# Patient Record
Sex: MALE | Race: WHITE | Employment: UNEMPLOYED | RURAL
[De-identification: names, ages, dates, MRNs, and addresses within clinical notes are randomized per-mention and may not be internally consistent; named-entity substitution may affect disease eponyms.]

---

## 2021-02-26 PROBLEM — Z86.718 HISTORY OF DVT (DEEP VEIN THROMBOSIS): Status: ACTIVE | Noted: 2021-02-26

## 2021-03-09 RX ORDER — OMEPRAZOLE 20 MG/1
CAPSULE, DELAYED RELEASE ORAL
Qty: 30 CAP | Refills: 5 | Status: SHIPPED | OUTPATIENT
Start: 2021-03-09 | End: 2022-02-11

## 2021-03-09 RX ORDER — WARFARIN 2 MG/1
TABLET ORAL
Qty: 30 TAB | Refills: 1 | Status: SHIPPED | OUTPATIENT
Start: 2021-03-09 | End: 2021-03-26

## 2021-03-09 NOTE — TELEPHONE ENCOUNTER
Received fax from pharmacy requesting refills including tamsulosin.  I do not see this on the current med list.  Please advise.  Patient does need all others refilled.  I have pended the ones that were listed just not the tamsulosin.    Requested Prescriptions     Pending Prescriptions Disp Refills   • warfarin (COUMADIN) 2 mg tablet 30 Tab 3     Sig: take 1 tablet by mouth once daily   • omeprazole (PRILOSEC) 20 mg capsule

## 2021-03-11 ENCOUNTER — HOSPITAL ENCOUNTER (OUTPATIENT)
Dept: BEHAVIORAL/MENTAL HEALTH | Age: 71
Discharge: HOME | End: 2021-03-11

## 2021-03-11 RX ORDER — MIRTAZAPINE 30 MG/1
15-30 TABLET, FILM COATED ORAL
Qty: 30 TAB | Refills: 5 | Status: SHIPPED | OUTPATIENT
Start: 2021-03-11 | End: 2021-03-12 | Stop reason: SDUPTHER

## 2021-03-11 RX ORDER — TAMSULOSIN HYDROCHLORIDE 0.4 MG/1
0.4 CAPSULE ORAL DAILY
Qty: 90 CAP | Refills: 1 | Status: SHIPPED | OUTPATIENT
Start: 2021-03-11 | End: 2021-08-31

## 2021-03-11 NOTE — PROGRESS NOTES
INTERVAL HISTORY (In Person):  Mr. Jose M Vargas is a 72-year-old  white male following up with me almost 9 months since his last appointment regarding a longstanding history of severe Generalized Anxiety Disorder as well as a remote history of Depression, for which he has been in remission for some time. He has been maintained on Diazepam for over 45 years as noted below, with reasonable control of his anxiety noted. Bianca Francois has historically been very sensitive to other medications including other benzos, and has been unable to tolerate many, many meds, but the Valium is something he has been comfortable with, and has utilized on a controlled basis.  Last year we started Fluoxetine due to some mild dysphoria but the tabs were too expensive so we went back to the capsules again.     He comes in today stating that he's been feeling \"OK\" overall, although he's still very sore physically from moving to [facility] 2 months ago. He's been more distressed by his aging, and feeling much more impaired and limited in his abilities. He's also been more distressed by various political and social issues, and that's made him more irritable and somewhat paranoid (thought my computer camera was recording our session and could be used against him). His suspiciousness diminished as the session progressed, but his thinking remained slightly eccentric and bordering on almost being delusional. Overall, however, he feels he's coping with everything as well as possible. He still feels the Diazepam and Mirtazapine help his anxiety and insomnia. He's fully off the Prozac again and we discussed possibly restarting it down the road, if needed. No med SE's reported at all.  Wants to stay on this regimen going forward.       CURRENT MEDICATIONS:  1.  Diazepam 10 mg qid prn--averages 3.5/day  2.   10-20 mg daily (20 mg tabs)--stopped this several months ago  3.  Mirtazapine 15 mg qhs prn (30 mg)--taking most every night     MENTAL STATUS EXAM:   Gulshan was noted to be a casually dressed, adequately groomed 82-year-old who appeared his stated age. He was fairly pleasant and cooperative, but slightly more irritable and anxious this time. still exhibits a slightly restricted affect, which is c/w his baseline.  He denied any severe depressive symptoms, but his mood has been slightly worse. However, he denies any feelings of hopeless or suicidal thinking. There was no evidence of any breana or hypomania, nor any symptoms of psychosis.  His anxiety is still evident but reasonably well controlled, overall.  His judgment and insight appear to be adequate, and his cognitive functioning appears to be fully intact with no deficits noted.       DIAGNOSTIC IMPRESSION:                     Axis I       Generalized Anxiety Disorder, moderate to severe (F41.1)                  Major Depression, mild (F33.0)  Axis II      Deferred. Axis III     Degenerative joint disease; history of DVT; lupus; history of bowel resection; chronic pain issues.     PLAN:  1.  Continue the Diazepam at 10 mg qid prn--has 3 months of refills. 2.  Continue off the --can restart it if needed. 3.  Continue the Mirtazapine at 15 mg qhs prn (30 mg)--#30 with 5 refills. 4.  He will follow up within the next 6 months, sooner if needed.

## 2021-03-11 NOTE — TELEPHONE ENCOUNTER
Received fax for refill on tamsulosin and I do not see on current med list.  Please advise or send in Tamsulosin 0.4 mg caps 1 cap PO every day to [facility].

## 2021-03-12 RX ORDER — MIRTAZAPINE 30 MG/1
15-30 TABLET, FILM COATED ORAL
Qty: 90 TAB | Refills: 1 | Status: SHIPPED | OUTPATIENT
Start: 2021-03-12 | End: 2021-11-22 | Stop reason: SDUPTHER

## 2021-03-22 ENCOUNTER — TELEPHONE (OUTPATIENT)
Dept: FAMILY MEDICINE CLINIC | Age: 71
End: 2021-03-22

## 2021-03-22 RX ORDER — WARFARIN 2 MG/1
TABLET ORAL
Qty: 30 TAB | Refills: 1 | Status: CANCELLED | OUTPATIENT
Start: 2021-03-22

## 2021-03-23 ENCOUNTER — TELEPHONE (OUTPATIENT)
Dept: PSYCHIATRY | Age: 71
End: 2021-03-23

## 2021-03-23 RX ORDER — FLUOXETINE 20 MG/1
20 TABLET ORAL DAILY
Qty: 30 TAB | Refills: 5 | Status: SHIPPED | OUTPATIENT
Start: 2021-03-23 | End: 2021-04-14 | Stop reason: SDUPTHER

## 2021-03-24 RX ORDER — WARFARIN 3 MG/1
3 TABLET ORAL DAILY
Qty: 30 TAB | Refills: 0 | Status: SHIPPED | OUTPATIENT
Start: 2021-03-24 | End: 2021-03-26 | Stop reason: SDUPTHER

## 2021-03-24 NOTE — TELEPHONE ENCOUNTER
Patient is needing refill of coumadin. He was prescribed by previous MD the 3 mg and confirmed that with patient and [facility] pharmacy. Patient needs corrected 3 mg rx sent to [facility] in [address] [place] [address]. Patient is coming in to discuss upcoming dental procedure and will have INR checked then and needs his 3 mg sent to pharmacy.

## 2021-03-26 ENCOUNTER — OFFICE VISIT (OUTPATIENT)
Dept: FAMILY MEDICINE CLINIC | Age: 71
End: 2021-03-26

## 2021-03-26 ENCOUNTER — TELEPHONE (OUTPATIENT)
Dept: FAMILY MEDICINE CLINIC | Age: 71
End: 2021-03-26

## 2021-03-26 VITALS
RESPIRATION RATE: 18 BRPM | TEMPERATURE: 97.1 F | SYSTOLIC BLOOD PRESSURE: 112 MMHG | OXYGEN SATURATION: 97 % | BODY MASS INDEX: 29.61 KG/M2 | HEIGHT: 68 IN | DIASTOLIC BLOOD PRESSURE: 71 MMHG | WEIGHT: 195.4 LBS | HEART RATE: 71 BPM

## 2021-03-26 DIAGNOSIS — Z86.718 HISTORY OF DVT (DEEP VEIN THROMBOSIS): ICD-10-CM

## 2021-03-26 DIAGNOSIS — Z13.220 LIPID SCREENING: ICD-10-CM

## 2021-03-26 DIAGNOSIS — Z11.59 ENCOUNTER FOR HEPATITIS C SCREENING TEST FOR LOW RISK PATIENT: ICD-10-CM

## 2021-03-26 DIAGNOSIS — Z00.00 MEDICARE ANNUAL WELLNESS VISIT, SUBSEQUENT: Primary | ICD-10-CM

## 2021-03-26 DIAGNOSIS — D68.59 HYPERCOAGULABLE STATE (HCC): ICD-10-CM

## 2021-03-26 DIAGNOSIS — K91.2 SHORT BOWEL SYNDROME: ICD-10-CM

## 2021-03-26 DIAGNOSIS — Z79.01 CHRONIC ANTICOAGULATION: ICD-10-CM

## 2021-03-26 DIAGNOSIS — E78.1 HYPERTRIGLYCERIDEMIA: ICD-10-CM

## 2021-03-26 DIAGNOSIS — Z79.01 LONG TERM (CURRENT) USE OF ANTICOAGULANTS: ICD-10-CM

## 2021-03-26 LAB
INR BLD: 2.4
PT POC: 24.5 SECONDS
VALID INTERNAL CONTROL?: YES

## 2021-03-26 PROCEDURE — G8536 NO DOC ELDER MAL SCRN: HCPCS | Performed by: FAMILY MEDICINE

## 2021-03-26 PROCEDURE — 1101F PT FALLS ASSESS-DOCD LE1/YR: CPT | Performed by: FAMILY MEDICINE

## 2021-03-26 PROCEDURE — G8417 CALC BMI ABV UP PARAM F/U: HCPCS | Performed by: FAMILY MEDICINE

## 2021-03-26 PROCEDURE — 36415 COLL VENOUS BLD VENIPUNCTURE: CPT | Performed by: FAMILY MEDICINE

## 2021-03-26 PROCEDURE — G8427 DOCREV CUR MEDS BY ELIG CLIN: HCPCS | Performed by: FAMILY MEDICINE

## 2021-03-26 PROCEDURE — 85610 PROTHROMBIN TIME: CPT | Performed by: FAMILY MEDICINE

## 2021-03-26 PROCEDURE — 99214 OFFICE O/P EST MOD 30 MIN: CPT | Performed by: FAMILY MEDICINE

## 2021-03-26 PROCEDURE — G0438 PPPS, INITIAL VISIT: HCPCS | Performed by: FAMILY MEDICINE

## 2021-03-26 PROCEDURE — G9717 DOC PT DX DEP/BP F/U NT REQ: HCPCS | Performed by: FAMILY MEDICINE

## 2021-03-26 PROCEDURE — 3017F COLORECTAL CA SCREEN DOC REV: CPT | Performed by: FAMILY MEDICINE

## 2021-03-26 RX ORDER — WARFARIN 3 MG/1
3 TABLET ORAL DAILY
Qty: 90 TAB | Refills: 1 | Status: SHIPPED | OUTPATIENT
Start: 2021-03-26 | End: 2021-04-23 | Stop reason: SINTOL

## 2021-03-26 RX ORDER — FENOFIBRATE 54 MG/1
TABLET ORAL
Qty: 90 TAB | Refills: 1 | Status: SHIPPED | OUTPATIENT
Start: 2021-03-26 | End: 2021-09-07 | Stop reason: SDUPTHER

## 2021-03-26 NOTE — PATIENT INSTRUCTIONS
Medicare Wellness Visit, Male The best way to live healthy is to have a lifestyle where you eat a well-balanced diet, exercise regularly, limit alcohol use, and quit all forms of tobacco/nicotine, if applicable. Regular preventive services are another way to keep healthy. Preventive services (vaccines, screening tests, monitoring & exams) can help personalize your care plan, which helps you manage your own care. Screening tests can find health problems at the earliest stages, when they are easiest to treat. [facility] follows the current, evidence-based guidelines published by the [facility] Raheel Robi ([facility]) when recommending preventive services for our patients. Because we follow these guidelines, sometimes recommendations change over time as research supports it. (For example, a prostate screening blood test is no longer routinely recommended for men with no symptoms). Of course, you and your doctor may decide to screen more often for some diseases, based on your risk and co-morbidities (chronic disease you are already diagnosed with). Preventive services for you include: - Medicare offers their members a free annual wellness visit, which is time for you and your primary care provider to discuss and plan for your preventive service needs. Take advantage of this benefit every year! 
-All adults over age 72 should receive the recommended pneumonia vaccines. Current USPSTF guidelines recommend a series of two vaccines for the best pneumonia protection.  
-All adults should have a flu vaccine yearly and tetanus vaccine every 10 years. 
-All adults age 48 and older should receive the shingles vaccines (series of two vaccines).       
-All adults age 38-68 who are overweight should have a diabetes screening test once every three years.  
-Other screening tests & preventive services for persons with diabetes include: an eye exam to screen for diabetic retinopathy, a kidney function test, a foot exam, and stricter control over your cholesterol.  
-Cardiovascular screening for adults with routine risk involves an electrocardiogram (ECG) at intervals determined by the provider.  
-Colorectal cancer screening should be done for adults age 54-65 with no increased risk factors for colorectal cancer. There are a number of acceptable methods of screening for this type of cancer. Each test has its own benefits and drawbacks. Discuss with your provider what is most appropriate for you during your annual wellness visit. The different tests include: colonoscopy (considered the best screening method), a fecal occult blood test, a fecal DNA test, and sigmoidoscopy. 
-All adults born [facility] should be screened once for Hepatitis C. 
-An Abdominal Aortic Aneurysm (AAA) Screening is recommended for men age 73-68 who has ever smoked in their lifetime. Here is a list of your current Health Maintenance items (your personalized list of preventive services) with a due date: 
Health Maintenance Due Topic Date Due  
 Hepatitis C Test  Never done  Colorectal Screening  Never done  Cholesterol Test   02/17/2021

## 2021-03-26 NOTE — PROGRESS NOTES
1. Have you been to the ER, urgent care clinic since your last visit? Hospitalized since your last visit?no    2. Have you seen or consulted any other health care providers outside of the [address] [place] [address] since your last visit? Include any pap smears or colon screening. no  This is an Initial Medicare Annual Wellness Exam (AWV) (Performed 12 months after IPPE or effective date of Medicare Part B enrollment, Once in a lifetime)    I have reviewed the patient's medical history in detail and updated the computerized patient record. Mode Mendoza is a 78 y.o. male who presents with the following:  Chief Complaint   Patient presents with    Annual Wellness Visit    Anticoagulation     Patient has a hypercoagulable state    Cholesterol Problem    Benign Prostatic Hypertrophy       Patient has hypertriglyceridemia which is controlled with fenofibrate and diet. Patient needs a refill of his medication. He is having no muscle pain or weakness on this medication. The patient has a hypercoagulable state because of the lupus antibody and the has to take warfarin to prevent thrombophlebitis which she has experienced in the past.  Currently the patient is on 3 mg of warfarin daily and he has an INR of 2.6. The patient has been having no abnormal bleeding nor bruising. Patient has symptomatic BPH which is helped somewhat by tamsulosin taken daily. The patient has irritable bowel syndrome for which she takes hyoscyamine 0.125 daily to help cut down on the cramping that he experiences. The patient has had a large amount of the small intestine removed but he cannot say where it came from. Allergies   Allergen Reactions    Haldol [Haloperidol Lactate] Rash       Current Outpatient Medications   Medication Sig    fenofibrate (LOFIBRA) 54 mg tablet TAKE 1 TABLET BY MOUTH EVERY DAY WITH FOOD    warfarin (COUMADIN) 3 mg tablet Take 1 Tab by mouth daily.     FLUoxetine (PROzac) 20 mg tablet Take 1 Tab by mouth daily.  mirtazapine (REMERON) 30 mg tablet Take 0.5-1 Tabs by mouth nightly.  tamsulosin (FLOMAX) 0.4 mg capsule Take 1 Cap by mouth daily.  omeprazole (PRILOSEC) 20 mg capsule TAKE 1 CAPSULE BY MOUTH ONCE A DAY 1/2 HOUR BEFORE BREAKFAST    hyoscyamine (ANASPAZ, LEVSIN) 0.125 mg tablet TAKE 1 TABLET BY MOUTH EVERY 4 HOURS AS NEEDED FOR PAIN    collagenase (SANTYL) 250 unit/gram ointment Apply  to affected area daily.  diazePAM (VALIUM) 10 mg tablet Take 1 Tab by mouth four (4) times daily as needed for Anxiety. Max Daily Amount: 40 mg.  polyethylene glycol (MIRALAX) 17 gram/dose powder Take 17 g by mouth daily. No current facility-administered medications for this visit. Past Medical History:   Diagnosis Date    Anxiety     BPH (benign prostatic hyperplasia)     Chronic anticoagulation     Depression     DJD (degenerative joint disease)     [facility]     ED (erectile dysfunction)     GERD (gastroesophageal reflux disease)     Hypercoagulable state ([facility])     lupus coag    Impaired glucose tolerance        Past Surgical History:   Procedure Laterality Date    HX ENDOSCOPY  2011    [place] ABDOMEN SURGERY PROC UNLISTED  2011    ischemic bowel d/t hypercoagulable state       Family History   Problem Relation Age of Onset    Heart Disease Father     Heart Disease Brother        Social History     Socioeconomic History    Marital status:      Spouse name: Not on file    Number of children: Not on file    Years of education: Not on file    Highest education level: Not on file   Tobacco Use    Smoking status: Never Smoker    Smokeless tobacco: Never Used   Substance and Sexual Activity    Alcohol use: No    Drug use: Yes     Types: Marijuana       Review of Systems   Constitutional: Negative for chills, fever, malaise/fatigue and weight loss. HENT: Negative for congestion, hearing loss, sore throat and tinnitus.     Eyes: Negative for blurred vision, pain and discharge. Respiratory: Negative for cough, shortness of breath and wheezing. Cardiovascular: Negative for chest pain, palpitations, orthopnea, claudication and leg swelling. Gastrointestinal: Positive for abdominal pain. Negative for constipation and heartburn. Genitourinary: Positive for frequency and urgency. Negative for dysuria. Musculoskeletal: Negative for falls, joint pain and myalgias. Skin: Negative for itching and rash. Neurological: Negative for dizziness, tingling, tremors and headaches. Endo/Heme/Allergies: Negative for environmental allergies and polydipsia. Psychiatric/Behavioral: Negative for depression and substance abuse. The patient is not nervous/anxious. Visit Vitals  /71   Pulse 71   Temp 97.1 °F (36.2 °C)   Resp 18   Ht 5' 8\" (1.727 m)   Wt 195 lb 6.4 oz (88.6 kg)   SpO2 97%   BMI 29.71 kg/m²     Physical Exam  Vitals signs reviewed. Constitutional:       Appearance: Normal appearance. HENT:      Head: Normocephalic and atraumatic. Right Ear: Tympanic membrane, ear canal and external ear normal.      Left Ear: Tympanic membrane, ear canal and external ear normal.      Nose: Nose normal. No congestion or rhinorrhea. Mouth/Throat:      Mouth: Mucous membranes are moist.      Pharynx: No oropharyngeal exudate or posterior oropharyngeal erythema. Eyes:      Extraocular Movements: Extraocular movements intact. Conjunctiva/sclera: Conjunctivae normal.      Pupils: Pupils are equal, round, and reactive to light. Comments: Pupil iris and anterior chamber normal.   Neck:      Musculoskeletal: Normal range of motion and neck supple. Trachea: No tracheal deviation. Cardiovascular:      Rate and Rhythm: Normal rate and regular rhythm. Pulses: Normal pulses. Heart sounds: Normal heart sounds. No murmur. No friction rub. No gallop.     Pulmonary:      Effort: Pulmonary effort is normal. No respiratory distress. Breath sounds: Normal breath sounds. No wheezing, rhonchi or rales. Chest:      Chest wall: No tenderness. Abdominal:      General: Bowel sounds are normal. There is no distension. Palpations: Abdomen is soft. There is no mass. Tenderness: There is no abdominal tenderness. There is no guarding or rebound. Hernia: No hernia is present. Genitourinary:     Comments: Patient has BPH. Musculoskeletal: Normal range of motion. General: No tenderness. Right lower leg: No edema. Left lower leg: No edema. Lymphadenopathy:      Cervical: No cervical adenopathy. Skin:     General: Skin is warm and dry. Findings: No erythema or rash. Neurological:      General: No focal deficit present. Mental Status: He is alert and oriented to person, place, and time. Cranial Nerves: No cranial nerve deficit. Motor: No abnormal muscle tone. Deep Tendon Reflexes: Reflexes are normal and symmetric. Reflexes normal.      Comments: Cranial nerves II through XII intact sensory and motor. Deep tendon reflexes in the biceps triceps knee and ankle are normal and bilaterally symmetrical.   Psychiatric:         Mood and Affect: Mood normal.         Behavior: Behavior normal.         Thought Content: Thought content normal.         Judgment: Judgment normal.           ICD-10-CM ICD-9-CM    1. Medicare annual wellness visit, subsequent  Z00.00 V70.0    2. Lipid screening  Z13.220 V77.91 LIPID PANEL      fenofibrate (LOFIBRA) 54 mg tablet      LIPID PANEL      COLLECTION VENOUS BLOOD,VENIPUNCTURE   3. Encounter for hepatitis C screening test for low risk patient  Z11.59 V73.89 HEPATITIS C AB      HEPATITIS C AB      COLLECTION VENOUS BLOOD,VENIPUNCTURE   4. Hypercoagulable state ([facility]  D68.59 289.81 COLLECTION VENOUS BLOOD,VENIPUNCTURE   5. Long term (current) use of anticoagulants  Z79.01 V58.61    6.  History of DVT (deep vein thrombosis)  Z86.718 V12.51 COLLECTION VENOUS BLOOD,VENIPUNCTURE   7. Chronic anticoagulation  Z79.01 V58.61 warfarin (COUMADIN) 3 mg tablet      AMB POC PT/INR   8. 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 579.3 METHYLMALONIC ACID      METABOLIC PANEL, COMPREHENSIVE      METABOLIC PANEL, COMPREHENSIVE      METHYLMALONIC ACID       Orders Placed This Encounter   • LIPID PANEL     Standing Status:   Future     Number of Occurrences:   1     Standing Expiration Date:   3/26/2022   • HEPATITIS C AB     Standing Status:   Future     Number of Occurrences:   1     Standing Expiration Date:   3/26/2022   • METHYLMALONIC ACID     Standing Status:   Future     Number of Occurrences:   1     Standing Expiration Date:   3/26/2022   • METABOLIC PANEL, COMPREHENSIVE     Standing Status:   Future     Number of Occurrences:   1     Standing Expiration Date:   3/26/2022   • AMB POC PT/INR   • COLLECTION VENOUS BLOOD,VENIPUNCTURE   • fenofibrate (LOFIBRA) 54 mg tablet     Sig: TAKE 1 TABLET BY MOUTH EVERY DAY WITH FOOD     Dispense:  90 Tab     Refill:  1   • warfarin (COUMADIN) 3 mg tablet     Sig: Take 1 Tab by mouth daily.     Dispense:  90 Tab     Refill:  1       Follow-up and Dispositions    · Return in about 4 weeks (around 4/23/2021) for INR.         SHERITA Rojas MD          Depression Risk Factor Screening:     3 most recent PHQ Screens 3/26/2021   Little interest or pleasure in doing things Several days   Feeling down, depressed, irritable, or hopeless Several days   Total Score PHQ 2 2       Alcohol Risk Screen    Do you average more than 1 drink per night or more than 7 drinks a week: No    In the past three months have you have had more than 4 drinks containing alcohol on one occasion: No         Functional Ability and Level of Safety:    Hearing: Hearing is good.     Activities of Daily Living:  The home contains: no  safety equipment. Patient does total self care     Ambulation: with no difficulty      Fall Risk:  Fall Risk Assessment, last 12 mths 3/26/2021   Able to walk? Yes   Fall in past 12 months? 0   Do you feel unsteady? 0   Are you worried about falling 0      Abuse Screen:  Patient is not abused       Cognitive Screening    Has your family/caregiver stated any concerns about your memory: no     Cognitive Screening: Normal - Clock Drawing Test    Assessment/Plan   Education and counseling provided:  Are appropriate based on today's review and evaluation    Diagnoses and all orders for this visit:    1. Medicare annual wellness visit, subsequent    2. Lipid screening  -     LIPID PANEL; Future  -     fenofibrate (LOFIBRA) 54 mg tablet; TAKE 1 TABLET BY MOUTH EVERY DAY WITH FOOD  -     COLLECTION VENOUS BLOOD,VENIPUNCTURE    3. Encounter for hepatitis C screening test for low risk patient  -     HEPATITIS C AB; Future  -     COLLECTION VENOUS BLOOD,VENIPUNCTURE    4. Hypercoagulable state ([address]  -     COLLECTION VENOUS BLOOD,VENIPUNCTURE    5. Long term (current) use of anticoagulants    6. History of DVT (deep vein thrombosis)  -     COLLECTION VENOUS BLOOD,VENIPUNCTURE    7. Chronic anticoagulation  -     warfarin (COUMADIN) 3 mg tablet; Take 1 Tab by mouth daily.  -     AMB POC PT/INR    8. Hypertriglyceridemia  -     LIPID PANEL; Future  -     fenofibrate (LOFIBRA) 54 mg tablet; TAKE 1 TABLET BY MOUTH EVERY DAY WITH FOOD  -     COLLECTION VENOUS BLOOD,VENIPUNCTURE    9. Short bowel syndrome  -     METHYLMALONIC ACID; Future  -     METABOLIC PANEL, COMPREHENSIVE; Future         Health Maintenance Due     Health Maintenance Due   Topic Date Due    Hepatitis C Screening  Never done    Colorectal Cancer Screening Combo  Never done    Lipid Screen  02/17/2021     Functional Ability:   Does the patient exhibit a steady gait?   {gen no default/yes/free text:[id number]::yes   How long did it take the patient to get up and walk from a sitting position? 2sec   Is the patient self reliant?  (ie can do own laundry, meals, household chores)  yes     Does the patient handle his/her own medications? yes     Does the patient handle his/her own money? yes     Is the patients home safe (ie good lighting, handrails on stairs and bath, etc.)? yes     Did you notice or did patient express any hearing difficulties? no     Did you notice or did patient express any vision difficulties?   no     Were distance and reading eye charts used? no       Advance Care Planning:   Patient was offered the opportunity to discuss advance care planning:  yes     Does patient have an Advance Directive:  yes   If no, did you provide information on Caring Connections?   yes     ADL Assessment 3/26/2021   Feeding yourself No Help Needed   Getting from bed to chair No Help Needed   Getting dressed No Help Needed   Bathing or showering No Help Needed   Walk across the room (includes cane/walker) No Help Needed   Using the telphone No Help Needed   Taking your medications No Help Needed   Preparing meals No Help Needed   Managing money (expenses/bills) No Help Needed   Moderately strenuous housework (laundry) No Help Needed   Shopping for personal items (toiletries/medicines) No Help Needed   Shopping for groceries No Help Needed   Driving No Help Needed   Climbing a flight of stairs No Help Needed   Getting to places beyond walking distances No Help Needed       Patient Care Team   Patient Care Team:  Ashworth, Lajuan Favre., MD as PCP - General (Family Medicine)  Johnny Anna MD as PCP - REHABILITATION [facility] Empaneled Provider    History     Patient Active Problem List   Diagnosis Code    Chronic anticoagulation Z79.01    Long term (current) use of anticoagulants Z79.01    Depression F32.9    Impaired glucose tolerance R73.02    DJD (degenerative joint disease) M19.90    GERD (gastroesophageal reflux disease) K21.9    Anxiety F41.9    BPH (benign prostatic hyperplasia) N40.0    Hypercoagulable state ([address] D68.59    History of DVT (deep vein thrombosis) Z86.718    Hypertriglyceridemia E78.1     Past Medical History:   Diagnosis Date    Anxiety     BPH (benign prostatic hyperplasia)     Chronic anticoagulation     Depression     DJD (degenerative joint disease)     DVT of leg (deep venous thrombosis) ([facility])     ED (erectile dysfunction)     GERD (gastroesophageal reflux disease)     Hypercoagulable state ([facility]     lupus coag    Impaired glucose tolerance       Past Surgical History:   Procedure Laterality Date    HX ENDOSCOPY  2011    [place] ABDOMEN SURGERY PROC UNLISTED  2011    ischemic bowel d/t hypercoagulable state     Current Outpatient Medications   Medication Sig Dispense Refill    fenofibrate (LOFIBRA) 54 mg tablet TAKE 1 TABLET BY MOUTH EVERY DAY WITH FOOD 90 Tab 1    warfarin (COUMADIN) 3 mg tablet Take 1 Tab by mouth daily. 90 Tab 1    FLUoxetine (PROzac) 20 mg tablet Take 1 Tab by mouth daily. 30 Tab 5    mirtazapine (REMERON) 30 mg tablet Take 0.5-1 Tabs by mouth nightly. 90 Tab 1    tamsulosin (FLOMAX) 0.4 mg capsule Take 1 Cap by mouth daily. 90 Cap 1    omeprazole (PRILOSEC) 20 mg capsule TAKE 1 CAPSULE BY MOUTH ONCE A DAY 1/2 HOUR BEFORE BREAKFAST 30 Cap 5    hyoscyamine (ANASPAZ, LEVSIN) 0.125 mg tablet TAKE 1 TABLET BY MOUTH EVERY 4 HOURS AS NEEDED FOR PAIN      collagenase (SANTYL) 250 unit/gram ointment Apply  to affected area daily. 15 g 0    diazePAM (VALIUM) 10 mg tablet Take 1 Tab by mouth four (4) times daily as needed for Anxiety. Max Daily Amount: 40 mg. 120 Tab 5    polyethylene glycol (MIRALAX) 17 gram/dose powder Take 17 g by mouth daily.  527 g 3     Allergies   Allergen Reactions    Haldol [Haloperidol Lactate] Rash       Family History   Problem Relation Age of Onset    Heart Disease Father     Heart Disease Brother      Social History     Tobacco Use    Smoking status: Never Smoker    Smokeless tobacco: Never Used   Substance Use Topics    Alcohol use: No     HOMAR Rojas MD

## 2021-04-01 RX ORDER — WARFARIN 2 MG/1
TABLET ORAL
Qty: 30 TAB | Refills: 1 | Status: SHIPPED | OUTPATIENT
Start: 2021-04-01 | End: 2021-04-23 | Stop reason: SDUPTHER

## 2021-04-02 LAB
Lab: NORMAL
METHYLMALONATE SERPL-SCNC: 143 NMOL/L (ref 0–378)

## 2021-04-03 LAB
ALBUMIN SERPL-MCNC: 4.1 G/DL (ref 3.5–5)
ALBUMIN/GLOB SERPL: 1.4 {RATIO} (ref 1.1–2.2)
ALP SERPL-CCNC: 55 U/L (ref 45–117)
ALT SERPL-CCNC: 31 U/L (ref 12–78)
ANION GAP SERPL CALC-SCNC: 4 MMOL/L (ref 5–15)
AST SERPL-CCNC: 17 U/L (ref 15–37)
BILIRUB SERPL-MCNC: 0.6 MG/DL (ref 0.2–1)
BUN SERPL-MCNC: 13 MG/DL (ref 6–20)
BUN/CREAT SERPL: 13 (ref 12–20)
CALCIUM SERPL-MCNC: 8.6 MG/DL (ref 8.5–10.1)
CHLORIDE SERPL-SCNC: 106 MMOL/L (ref 97–108)
CHOLEST SERPL-MCNC: 153 MG/DL
CO2 SERPL-SCNC: 29 MMOL/L (ref 21–32)
CREAT SERPL-MCNC: 1.04 MG/DL (ref 0.7–1.3)
GLOBULIN SER CALC-MCNC: 3 G/DL (ref 2–4)
GLUCOSE SERPL-MCNC: 92 MG/DL (ref 65–100)
HCV AB SERPL QL IA: NONREACTIVE
HCV COMMENT,HCGAC: NORMAL
HDLC SERPL-MCNC: 40 MG/DL
HDLC SERPL: 3.8 {RATIO} (ref 0–5)
LDLC SERPL CALC-MCNC: 60 MG/DL (ref 0–100)
LIPID PROFILE,FLP: ABNORMAL
POTASSIUM SERPL-SCNC: 4.5 MMOL/L (ref 3.5–5.1)
PROT SERPL-MCNC: 7.1 G/DL (ref 6.4–8.2)
SODIUM SERPL-SCNC: 139 MMOL/L (ref 136–145)
TRIGL SERPL-MCNC: 265 MG/DL (ref ?–150)
VLDLC SERPL CALC-MCNC: 53 MG/DL

## 2021-04-14 RX ORDER — FLUOXETINE 20 MG/1
20 TABLET ORAL DAILY
Qty: 90 TAB | Refills: 3 | Status: SHIPPED | OUTPATIENT
Start: 2021-04-14 | End: 2021-05-20

## 2021-04-23 ENCOUNTER — OFFICE VISIT (OUTPATIENT)
Dept: FAMILY MEDICINE CLINIC | Age: 71
End: 2021-04-23
Payer: MEDICARE

## 2021-04-23 VITALS
OXYGEN SATURATION: 99 % | DIASTOLIC BLOOD PRESSURE: 76 MMHG | HEIGHT: 68 IN | HEART RATE: 53 BPM | RESPIRATION RATE: 18 BRPM | BODY MASS INDEX: 29.61 KG/M2 | SYSTOLIC BLOOD PRESSURE: 124 MMHG | TEMPERATURE: 97.1 F | WEIGHT: 195.38 LBS

## 2021-04-23 DIAGNOSIS — D68.59 HYPERCOAGULABLE STATE (HCC): Primary | ICD-10-CM

## 2021-04-23 DIAGNOSIS — Z86.718 HISTORY OF DVT (DEEP VEIN THROMBOSIS): ICD-10-CM

## 2021-04-23 DIAGNOSIS — Z79.01 LONG TERM (CURRENT) USE OF ANTICOAGULANTS: ICD-10-CM

## 2021-04-23 LAB
INR BLD: 4.8
PT POC: 57 SECONDS
VALID INTERNAL CONTROL?: YES

## 2021-04-23 PROCEDURE — 99213 OFFICE O/P EST LOW 20 MIN: CPT | Performed by: FAMILY MEDICINE

## 2021-04-23 PROCEDURE — 85610 PROTHROMBIN TIME: CPT | Performed by: FAMILY MEDICINE

## 2021-04-23 PROCEDURE — G8536 NO DOC ELDER MAL SCRN: HCPCS | Performed by: FAMILY MEDICINE

## 2021-04-23 PROCEDURE — 1101F PT FALLS ASSESS-DOCD LE1/YR: CPT | Performed by: FAMILY MEDICINE

## 2021-04-23 PROCEDURE — G8427 DOCREV CUR MEDS BY ELIG CLIN: HCPCS | Performed by: FAMILY MEDICINE

## 2021-04-23 PROCEDURE — G9717 DOC PT DX DEP/BP F/U NT REQ: HCPCS | Performed by: FAMILY MEDICINE

## 2021-04-23 PROCEDURE — 3017F COLORECTAL CA SCREEN DOC REV: CPT | Performed by: FAMILY MEDICINE

## 2021-04-23 PROCEDURE — G8417 CALC BMI ABV UP PARAM F/U: HCPCS | Performed by: FAMILY MEDICINE

## 2021-04-23 RX ORDER — ENOXAPARIN SODIUM 100 MG/ML
40 INJECTION SUBCUTANEOUS DAILY
Qty: 7 SYRINGE | Refills: 0 | Status: SHIPPED | OUTPATIENT
Start: 2021-04-23 | End: 2021-05-21 | Stop reason: ALTCHOICE

## 2021-04-23 RX ORDER — WARFARIN 1 MG/1
TABLET ORAL
Qty: 90 TAB | Refills: 1 | Status: SHIPPED | OUTPATIENT
Start: 2021-04-23 | End: 2021-05-21

## 2021-04-23 NOTE — PROGRESS NOTES
1. Have you been to the ER, urgent care clinic since your last visit? Hospitalized since your last visit? No    2. Have you seen or consulted any other health care providers outside of the [address] [place] [address] since your last visit? Include any pap smears or colon screening. No     Chief Complaint   Patient presents with    Anticoagulation     Visit Vitals  /76 (BP 1 Location: Left arm, BP Patient Position: Sitting)   Pulse (!) 53   Temp 97.1 °F (36.2 °C) (Temporal)   Resp 18   Ht 5' 8\" (1.727 m)   Wt 195 lb 6 oz (88.6 kg)   SpO2 99%   BMI 29.71 kg/m²     Patient states taking 3 mg daily except he took a 2 mg tab last night instead of the 3 mg just to \"see what it would do today\".

## 2021-04-23 NOTE — PROGRESS NOTES
Nelly Don is a 78 y.o. male who presents with the following:  Chief Complaint   Patient presents with    Anticoagulation       Patient has a history of 2 DVTs and essentially has a hyper coagulable state for which she needs to be on warfarin however he has elevated triglycerides and needs to be on fenofibrate so his warfarin dose will need to be reduced and as a last resort we may have to stop the fenofibrate. Patient states that he would like to be bridged with Lovenox as he has done this before stopping the warfarin until after his dental surgery next Wednesday. As the patient's INR was 4.8 today I suggested that he stop his warfarin today and start bridging with the Lovenox on Saturday morning and continue this for 7 days with the 1 exception being not taking the Lovenox on the morning of the dental surgery and waiting until after the bleeding from that had ceased and also starting back on his warfarin at 1 mg daily on Wednesday after the surgery. We will recheck an INR on Tuesday and again Wednesday if necessary in the morning and then recheck it again on Friday to make sure that we are getting close to therapeutic. Allergies   Allergen Reactions    Haldol [Haloperidol Lactate] Rash       Current Outpatient Medications   Medication Sig    warfarin (COUMADIN) 1 mg tablet TAKE 1 TABLET BY MOUTH EVERY DAY beginning after tooth extraction    enoxaparin (LOVENOX) 40 mg/0.4 mL 0.4 mL by SubCUTAneous route daily. Give injections in the morning but hold the Wednesday injection until after surgery    FLUoxetine (PROzac) 20 mg tablet Take 1 Tab by mouth daily.  fenofibrate (LOFIBRA) 54 mg tablet TAKE 1 TABLET BY MOUTH EVERY DAY WITH FOOD    mirtazapine (REMERON) 30 mg tablet Take 0.5-1 Tabs by mouth nightly.  tamsulosin (FLOMAX) 0.4 mg capsule Take 1 Cap by mouth daily.     omeprazole (PRILOSEC) 20 mg capsule TAKE 1 CAPSULE BY MOUTH ONCE A DAY 1/2 HOUR BEFORE BREAKFAST    hyoscyamine (ANASPAZ, LEVSIN) 0.125 mg tablet TAKE 1 TABLET BY MOUTH EVERY 4 HOURS AS NEEDED FOR PAIN    collagenase (SANTYL) 250 unit/gram ointment Apply  to affected area daily.  diazePAM (VALIUM) 10 mg tablet Take 1 Tab by mouth four (4) times daily as needed for Anxiety. Max Daily Amount: 40 mg.  polyethylene glycol (MIRALAX) 17 gram/dose powder Take 17 g by mouth daily. No current facility-administered medications for this visit. Past Medical History:   Diagnosis Date    Anxiety     BPH (benign prostatic hyperplasia)     Chronic anticoagulation     Depression     DJD (degenerative joint disease)     [facility]     ED (erectile dysfunction)     GERD (gastroesophageal reflux disease)     Hypercoagulable state ([facility])     lupus coag    Impaired glucose tolerance        Past Surgical History:   Procedure Laterality Date    HX ENDOSCOPY  2011    [place] ABDOMEN SURGERY PROC UNLISTED  2011    ischemic bowel d/t hypercoagulable state       Family History   Problem Relation Age of Onset    Heart Disease Father     Heart Disease Brother        Social History     Socioeconomic History    Marital status:      Spouse name: Not on file    Number of children: Not on file    Years of education: Not on file    Highest education level: Not on file   Tobacco Use    Smoking status: Never Smoker    Smokeless tobacco: Never Used   Substance and Sexual Activity    Alcohol use: No    Drug use: Yes     Types: Marijuana       Review of Systems   Constitutional: Negative for chills, fever, malaise/fatigue and weight loss. HENT: Negative for congestion, hearing loss, sore throat and tinnitus. Needs dental extraction   Eyes: Negative for blurred vision, pain and discharge. Respiratory: Negative for cough, shortness of breath and wheezing. Cardiovascular: Negative for chest pain, palpitations, orthopnea, claudication and leg swelling.    Gastrointestinal: Negative for abdominal pain, constipation and heartburn. Genitourinary: Negative for dysuria, frequency and urgency. Musculoskeletal: Negative for falls, joint pain and myalgias. Skin: Negative for itching and rash. Neurological: Negative for dizziness, tingling, tremors and headaches. Endo/Heme/Allergies: Negative for environmental allergies and polydipsia. Psychiatric/Behavioral: Negative for depression and substance abuse. The patient is not nervous/anxious. Visit Vitals  /76 (BP 1 Location: Left arm, BP Patient Position: Sitting)   Pulse (!) 53   Temp 97.1 °F (36.2 °C) (Temporal)   Resp 18   Ht 5' 8\" (1.727 m)   Wt 195 lb 6 oz (88.6 kg)   SpO2 99%   BMI 29.71 kg/m²     Physical Exam  Vitals signs reviewed. Constitutional:       General: He is not in acute distress. Appearance: Normal appearance. He is not ill-appearing. HENT:      Head: Normocephalic and atraumatic. Right Ear: Tympanic membrane, ear canal and external ear normal.      Left Ear: Tympanic membrane, ear canal and external ear normal.      Nose: Nose normal. No congestion or rhinorrhea. Mouth/Throat:      Mouth: Mucous membranes are moist.      Pharynx: No oropharyngeal exudate or posterior oropharyngeal erythema. Eyes:      Extraocular Movements: Extraocular movements intact. Conjunctiva/sclera: Conjunctivae normal.      Pupils: Pupils are equal, round, and reactive to light. Comments: Pupil iris and anterior chamber normal.   Neck:      Musculoskeletal: Normal range of motion and neck supple. Trachea: No tracheal deviation. Cardiovascular:      Rate and Rhythm: Normal rate and regular rhythm. Pulses: Normal pulses. Heart sounds: Normal heart sounds. No murmur. No friction rub. No gallop. Pulmonary:      Effort: Pulmonary effort is normal. No respiratory distress. Breath sounds: Normal breath sounds. No wheezing, rhonchi or rales. Chest:      Chest wall: No tenderness.    Abdominal: General: Bowel sounds are normal. There is no distension. Palpations: Abdomen is soft. There is no mass. Tenderness: There is no abdominal tenderness. There is no guarding or rebound. Hernia: No hernia is present. Musculoskeletal: Normal range of motion. General: No tenderness. Right lower leg: No edema. Left lower leg: No edema. Lymphadenopathy:      Cervical: No cervical adenopathy. Skin:     General: Skin is warm and dry. Findings: No bruising, erythema or rash. Neurological:      General: No focal deficit present. Mental Status: He is alert and oriented to person, place, and time. Cranial Nerves: No cranial nerve deficit. Motor: No abnormal muscle tone. Deep Tendon Reflexes: Reflexes are normal and symmetric. Reflexes normal.      Comments: Cranial nerves II through XII intact sensory and motor. Deep tendon reflexes in the biceps triceps knee and ankle are normal and bilaterally symmetrical.   Psychiatric:         Mood and Affect: Mood normal.         Behavior: Behavior normal.         Thought Content: Thought content normal.         Judgment: Judgment normal.           ICD-10-CM ICD-9-CM    1. Hypercoagulable state ([facility]  D68.59 289.81 COLLECTION CAPILLARY BLOOD SPECIMEN      AMB POC PT/INR      warfarin (COUMADIN) 1 mg tablet      enoxaparin (LOVENOX) 40 mg/0.4 mL   2. History of DVT (deep vein thrombosis)  Z86.718 V12.51 COLLECTION CAPILLARY BLOOD SPECIMEN      AMB POC PT/INR   3. Long term (current) use of anticoagulants  Z79.01 V58.61 COLLECTION CAPILLARY BLOOD SPECIMEN      AMB POC PT/INR       Orders Placed This Encounter    COLLECTION CAPILLARY BLOOD SPECIMEN    AMB POC PT/INR    warfarin (COUMADIN) 1 mg tablet     Sig: TAKE 1 TABLET BY MOUTH EVERY DAY beginning after tooth extraction     Dispense:  90 Tab     Refill:  1    enoxaparin (LOVENOX) 40 mg/0.4 mL     Si.4 mL by SubCUTAneous route daily.  Give injections in the morning but hold the Wednesday injection until after surgery     Dispense:  7 Syringe     Refill:  0       Follow-up and Dispositions    · Return in about 4 days (around 4/27/2021) for Recheck INR.          Wallace Zhang MD

## 2021-04-27 ENCOUNTER — OFFICE VISIT (OUTPATIENT)
Dept: FAMILY MEDICINE CLINIC | Age: 71
End: 2021-04-27
Payer: MEDICARE

## 2021-04-27 ENCOUNTER — DOCUMENTATION ONLY (OUTPATIENT)
Dept: FAMILY MEDICINE CLINIC | Age: 71
End: 2021-04-27

## 2021-04-27 VITALS
TEMPERATURE: 97.7 F | HEART RATE: 61 BPM | DIASTOLIC BLOOD PRESSURE: 70 MMHG | BODY MASS INDEX: 29.48 KG/M2 | WEIGHT: 194.5 LBS | HEIGHT: 68 IN | OXYGEN SATURATION: 97 % | RESPIRATION RATE: 18 BRPM | SYSTOLIC BLOOD PRESSURE: 120 MMHG

## 2021-04-27 DIAGNOSIS — Z79.01 LONG TERM (CURRENT) USE OF ANTICOAGULANTS: ICD-10-CM

## 2021-04-27 DIAGNOSIS — Z86.718 HISTORY OF DVT (DEEP VEIN THROMBOSIS): Primary | ICD-10-CM

## 2021-04-27 LAB
INR BLD: 1.5
PT POC: 18 SECONDS
VALID INTERNAL CONTROL?: YES

## 2021-04-27 PROCEDURE — 99212 OFFICE O/P EST SF 10 MIN: CPT | Performed by: FAMILY MEDICINE

## 2021-04-27 PROCEDURE — 85610 PROTHROMBIN TIME: CPT | Performed by: FAMILY MEDICINE

## 2021-04-27 PROCEDURE — 1101F PT FALLS ASSESS-DOCD LE1/YR: CPT | Performed by: FAMILY MEDICINE

## 2021-04-27 PROCEDURE — G9717 DOC PT DX DEP/BP F/U NT REQ: HCPCS | Performed by: FAMILY MEDICINE

## 2021-04-27 PROCEDURE — G8536 NO DOC ELDER MAL SCRN: HCPCS | Performed by: FAMILY MEDICINE

## 2021-04-27 PROCEDURE — 3017F COLORECTAL CA SCREEN DOC REV: CPT | Performed by: FAMILY MEDICINE

## 2021-04-27 PROCEDURE — G8427 DOCREV CUR MEDS BY ELIG CLIN: HCPCS | Performed by: FAMILY MEDICINE

## 2021-04-27 PROCEDURE — G8417 CALC BMI ABV UP PARAM F/U: HCPCS | Performed by: FAMILY MEDICINE

## 2021-04-27 NOTE — PROGRESS NOTES
Apollo Fournier is a 78 y.o. male who presents with the following:  Chief Complaint   Patient presents with    Anticoagulation     patient has upcoming tooth extraction       Patient is in to have his INR checked today as she has to have it below 2 for the oral surgeon to remove the tooth. Patient is currently being bridged with Lovenox and will resume the warfarin after the surgery is completed. Allergies   Allergen Reactions    Haldol [Haloperidol Lactate] Rash       Current Outpatient Medications   Medication Sig    warfarin (COUMADIN) 1 mg tablet TAKE 1 TABLET BY MOUTH EVERY DAY beginning after tooth extraction    enoxaparin (LOVENOX) 40 mg/0.4 mL 0.4 mL by SubCUTAneous route daily. Give injections in the morning but hold the Wednesday injection until after surgery    FLUoxetine (PROzac) 20 mg tablet Take 1 Tab by mouth daily.  fenofibrate (LOFIBRA) 54 mg tablet TAKE 1 TABLET BY MOUTH EVERY DAY WITH FOOD    mirtazapine (REMERON) 30 mg tablet Take 0.5-1 Tabs by mouth nightly.  tamsulosin (FLOMAX) 0.4 mg capsule Take 1 Cap by mouth daily.  omeprazole (PRILOSEC) 20 mg capsule TAKE 1 CAPSULE BY MOUTH ONCE A DAY 1/2 HOUR BEFORE BREAKFAST    hyoscyamine (ANASPAZ, LEVSIN) 0.125 mg tablet TAKE 1 TABLET BY MOUTH EVERY 4 HOURS AS NEEDED FOR PAIN    collagenase (SANTYL) 250 unit/gram ointment Apply  to affected area daily.  diazePAM (VALIUM) 10 mg tablet Take 1 Tab by mouth four (4) times daily as needed for Anxiety. Max Daily Amount: 40 mg.  polyethylene glycol (MIRALAX) 17 gram/dose powder Take 17 g by mouth daily. No current facility-administered medications for this visit.         Past Medical History:   Diagnosis Date    Anxiety     BPH (benign prostatic hyperplasia)     Chronic anticoagulation     Depression     DJD (degenerative joint disease)     DVT of leg (deep venous thrombosis) ([facility])     ED (erectile dysfunction)     GERD (gastroesophageal reflux disease)     Hypercoagulable state ([facility]     lupus coag    Impaired glucose tolerance        Past Surgical History:   Procedure Laterality Date    HX ENDOSCOPY  2011    [place] ABDOMEN SURGERY [address] UNLISTED  2011    ischemic bowel d/t hypercoagulable state       Family History   Problem Relation Age of Onset    Heart Disease Father     Heart Disease Brother        Social History     Socioeconomic History    Marital status:      Spouse name: Not on file    Number of children: Not on file    Years of education: Not on file    Highest education level: Not on file   Tobacco Use    Smoking status: Never Smoker    Smokeless tobacco: Never Used   Substance and Sexual Activity    Alcohol use: No    Drug use: Yes     Types: Marijuana       Review of Systems   Constitutional: Negative for chills, fever, malaise/fatigue and weight loss. HENT: Negative for congestion, hearing loss, sore throat and tinnitus. Eyes: Negative for blurred vision, pain and discharge. Respiratory: Negative for cough, shortness of breath and wheezing. Cardiovascular: Negative for chest pain, palpitations, orthopnea, claudication and leg swelling. Gastrointestinal: Negative for abdominal pain, constipation and heartburn. Genitourinary: Negative for dysuria, frequency and urgency. Musculoskeletal: Negative for falls, joint pain and myalgias. Skin: Negative for itching and rash. Neurological: Negative for dizziness, tingling, tremors and headaches. Endo/Heme/Allergies: Negative for environmental allergies and polydipsia. Psychiatric/Behavioral: Negative for depression and substance abuse. The patient is not nervous/anxious. Visit Vitals  /70 (BP 1 Location: Left arm, BP Patient Position: Sitting)   Pulse 61   Temp 97.7 °F (36.5 °C) (Oral)   Resp 18   Ht 5' 8\" (1.727 m)   Wt 194 lb 8 oz (88.2 kg)   SpO2 97%   BMI 29.57 kg/m²     Physical Exam  Vitals signs reviewed.    Constitutional:       Appearance: Normal appearance. HENT:      Head: Normocephalic and atraumatic. Right Ear: Tympanic membrane, ear canal and external ear normal.      Left Ear: Tympanic membrane, ear canal and external ear normal.      Nose: Nose normal. No congestion or rhinorrhea. Mouth/Throat:      Mouth: Mucous membranes are moist.      Pharynx: No oropharyngeal exudate or posterior oropharyngeal erythema. Eyes:      Extraocular Movements: Extraocular movements intact. Conjunctiva/sclera: Conjunctivae normal.      Pupils: Pupils are equal, round, and reactive to light. Comments: Pupil iris and anterior chamber normal.   Neck:      Musculoskeletal: Normal range of motion and neck supple. Trachea: No tracheal deviation. Cardiovascular:      Rate and Rhythm: Normal rate and regular rhythm. Pulses: Normal pulses. Heart sounds: Normal heart sounds. No murmur. No friction rub. No gallop. Pulmonary:      Effort: Pulmonary effort is normal. No respiratory distress. Breath sounds: Normal breath sounds. No wheezing, rhonchi or rales. Chest:      Chest wall: No tenderness. Abdominal:      General: Bowel sounds are normal. There is no distension. Palpations: Abdomen is soft. There is no mass. Tenderness: There is no abdominal tenderness. There is no guarding or rebound. Hernia: No hernia is present. Musculoskeletal: Normal range of motion. General: No tenderness. Right lower leg: No edema. Left lower leg: No edema. Lymphadenopathy:      Cervical: No cervical adenopathy. Skin:     General: Skin is warm and dry. Findings: No erythema or rash. Neurological:      General: No focal deficit present. Mental Status: He is alert and oriented to person, place, and time. Cranial Nerves: No cranial nerve deficit. Motor: No abnormal muscle tone. Deep Tendon Reflexes: Reflexes are normal and symmetric.  Reflexes normal.      Comments: Cranial nerves II through XII intact sensory and motor. Deep tendon reflexes in the biceps triceps knee and ankle are normal and bilaterally symmetrical.   Psychiatric:         Mood and Affect: Mood normal.         Behavior: Behavior normal.           ICD-10-CM ICD-9-CM    1. History of DVT (deep vein thrombosis)  Z86.718 V12.51 COLLECTION CAPILLARY BLOOD SPECIMEN      AMB POC PT/INR   2. Long term (current) use of anticoagulants  Z79.01 V58.61 COLLECTION CAPILLARY BLOOD SPECIMEN      AMB POC PT/INR       Orders Placed This Encounter    COLLECTION CAPILLARY BLOOD SPECIMEN    AMB POC PT/INR   INR is 1.5 so the patient is set to have his oral surgery.         Sadia Freeman MD

## 2021-04-27 NOTE — PROGRESS NOTES
1. Have you been to the ER, urgent care clinic since your last visit? Hospitalized since your last visit? No    2. Have you seen or consulted any other health care providers outside of the [address] [place] [address] since your last visit? Include any pap smears or colon screening. No     Chief Complaint   Patient presents with    Anticoagulation     patient has upcoming tooth extraction       Patient not taking coumadin at this time and will resume after tooth extraction. Patient only on Lovenox injections.       Visit Vitals  /70 (BP 1 Location: Left arm, BP Patient Position: Sitting)   Pulse 61   Temp 97.7 °F (36.5 °C) (Oral)   Resp 18   Ht 5' 8\" (1.727 m)   Wt 194 lb 8 oz (88.2 kg)   SpO2 97%   BMI 29.57 kg/m²

## 2021-04-29 ENCOUNTER — TELEPHONE (OUTPATIENT)
Dept: FAMILY MEDICINE CLINIC | Age: 71
End: 2021-04-29

## 2021-04-29 RX ORDER — WARFARIN 2 MG/1
TABLET ORAL
Qty: 30 TAB | Refills: 1 | OUTPATIENT
Start: 2021-04-29

## 2021-04-29 NOTE — TELEPHONE ENCOUNTER
Yes the patient was told to take the remaining 2 shots as this is a bridge while at the current warfarin is beginning to work he needs something that is working so he needs to finish the 2 shots

## 2021-04-29 NOTE — TELEPHONE ENCOUNTER
Please call Mr. Wade Serve and find out why I am getting a duplicate request on the medication I ordered on 423

## 2021-05-13 ENCOUNTER — OFFICE VISIT (OUTPATIENT)
Dept: FAMILY MEDICINE CLINIC | Age: 71
End: 2021-05-13
Payer: MEDICARE

## 2021-05-13 DIAGNOSIS — Z79.01 LONG TERM (CURRENT) USE OF ANTICOAGULANTS: Primary | ICD-10-CM

## 2021-05-13 LAB
INR BLD: 1.2
PT POC: 14.1 SECONDS
VALID INTERNAL CONTROL?: YES

## 2021-05-13 PROCEDURE — 99213 OFFICE O/P EST LOW 20 MIN: CPT | Performed by: FAMILY MEDICINE

## 2021-05-13 PROCEDURE — G8427 DOCREV CUR MEDS BY ELIG CLIN: HCPCS | Performed by: FAMILY MEDICINE

## 2021-05-13 PROCEDURE — 3017F COLORECTAL CA SCREEN DOC REV: CPT | Performed by: FAMILY MEDICINE

## 2021-05-13 PROCEDURE — 1101F PT FALLS ASSESS-DOCD LE1/YR: CPT | Performed by: FAMILY MEDICINE

## 2021-05-13 PROCEDURE — G9717 DOC PT DX DEP/BP F/U NT REQ: HCPCS | Performed by: FAMILY MEDICINE

## 2021-05-13 PROCEDURE — G8417 CALC BMI ABV UP PARAM F/U: HCPCS | Performed by: FAMILY MEDICINE

## 2021-05-13 PROCEDURE — G8536 NO DOC ELDER MAL SCRN: HCPCS | Performed by: FAMILY MEDICINE

## 2021-05-13 PROCEDURE — 85610 PROTHROMBIN TIME: CPT | Performed by: FAMILY MEDICINE

## 2021-05-13 NOTE — PROGRESS NOTES
Viv Peterson is a 78 y.o. male who presents with the following:  Chief Complaint   Patient presents with    Anticoagulation       Patient is on warfarin for anticoagulation because of the history of thrombophlebitis and also of blood clots in the vessels to his intestines that caused him to lose most of the small intestine try to prevent that from happening again. Patient has been taking 1 mg daily and his INR is only at 1.2 and his INR was a slight bit high when he was taking 3 mg daily so we will try to have him take 3 mg every day except taking 1 mg on Sunday and then we would recheck his INR next week. Allergies   Allergen Reactions    Haldol [Haloperidol Lactate] Rash       Current Outpatient Medications   Medication Sig    warfarin (COUMADIN) 1 mg tablet TAKE 1 TABLET BY MOUTH EVERY DAY beginning after tooth extraction    enoxaparin (LOVENOX) 40 mg/0.4 mL 0.4 mL by SubCUTAneous route daily. Give injections in the morning but hold the Wednesday injection until after surgery    FLUoxetine (PROzac) 20 mg tablet Take 1 Tab by mouth daily.  fenofibrate (LOFIBRA) 54 mg tablet TAKE 1 TABLET BY MOUTH EVERY DAY WITH FOOD    mirtazapine (REMERON) 30 mg tablet Take 0.5-1 Tabs by mouth nightly.  tamsulosin (FLOMAX) 0.4 mg capsule Take 1 Cap by mouth daily.  omeprazole (PRILOSEC) 20 mg capsule TAKE 1 CAPSULE BY MOUTH ONCE A DAY 1/2 HOUR BEFORE BREAKFAST    hyoscyamine (ANASPAZ, LEVSIN) 0.125 mg tablet TAKE 1 TABLET BY MOUTH EVERY 4 HOURS AS NEEDED FOR PAIN    collagenase (SANTYL) 250 unit/gram ointment Apply  to affected area daily.  diazePAM (VALIUM) 10 mg tablet Take 1 Tab by mouth four (4) times daily as needed for Anxiety. Max Daily Amount: 40 mg.  polyethylene glycol (MIRALAX) 17 gram/dose powder Take 17 g by mouth daily. No current facility-administered medications for this visit.         Past Medical History:   Diagnosis Date    Anxiety     BPH (benign prostatic hyperplasia)     Chronic anticoagulation     Depression     DJD (degenerative joint disease)     [facility])     ED (erectile dysfunction)     GERD (gastroesophageal reflux disease)     Hypercoagulable state ([facility])     lupus coag    Impaired glucose tolerance        Past Surgical History:   Procedure Laterality Date    HX ENDOSCOPY  2011    [place] ABDOMEN SURGERY PROC UNLISTED  2011    ischemic bowel d/t hypercoagulable state       Family History   Problem Relation Age of Onset    Heart Disease Father     Heart Disease Brother        Social History     Socioeconomic History    Marital status:      Spouse name: Not on file    Number of children: Not on file    Years of education: Not on file    Highest education level: Not on file   Tobacco Use    Smoking status: Never Smoker    Smokeless tobacco: Never Used   Substance and Sexual Activity    Alcohol use: No    Drug use: Yes     Types: Marijuana       Review of Systems   Constitutional: Negative for chills, fever, malaise/fatigue and weight loss. HENT: Negative for congestion, hearing loss, sore throat and tinnitus. Eyes: Negative for blurred vision, pain and discharge. Respiratory: Negative for cough, shortness of breath and wheezing. Cardiovascular: Negative for chest pain, palpitations, orthopnea, claudication and leg swelling. Gastrointestinal: Negative for abdominal pain, constipation and heartburn. Genitourinary: Negative for dysuria, frequency and urgency. Musculoskeletal: Negative for falls, joint pain and myalgias. Skin: Negative for itching and rash. Neurological: Negative for dizziness, tingling, tremors and headaches. Endo/Heme/Allergies: Negative for environmental allergies and polydipsia. Does not bruise/bleed easily. Psychiatric/Behavioral: Negative for depression and substance abuse. The patient is not nervous/anxious. There were no vitals taken for this visit.   Physical Exam  Vitals signs reviewed. Constitutional:       General: He is not in acute distress. Appearance: Normal appearance. He is not ill-appearing. HENT:      Head: Normocephalic and atraumatic. Right Ear: Tympanic membrane, ear canal and external ear normal.      Left Ear: Tympanic membrane, ear canal and external ear normal.      Nose: Nose normal. No congestion or rhinorrhea. Mouth/Throat:      Mouth: Mucous membranes are moist.      Pharynx: No oropharyngeal exudate or posterior oropharyngeal erythema. Eyes:      Extraocular Movements: Extraocular movements intact. Conjunctiva/sclera: Conjunctivae normal.      Pupils: Pupils are equal, round, and reactive to light. Comments: Pupil iris and anterior chamber normal.   Neck:      Musculoskeletal: Normal range of motion and neck supple. Trachea: No tracheal deviation. Cardiovascular:      Rate and Rhythm: Normal rate and regular rhythm. Pulses: Normal pulses. Heart sounds: Normal heart sounds. No murmur. No friction rub. No gallop. Pulmonary:      Effort: Pulmonary effort is normal. No respiratory distress. Breath sounds: Normal breath sounds. No wheezing, rhonchi or rales. Chest:      Chest wall: No tenderness. Abdominal:      General: Bowel sounds are normal. There is no distension. Palpations: Abdomen is soft. There is no mass. Tenderness: There is no abdominal tenderness. There is no guarding or rebound. Hernia: No hernia is present. Musculoskeletal: Normal range of motion. General: No tenderness. Right lower leg: No edema. Left lower leg: No edema. Lymphadenopathy:      Cervical: No cervical adenopathy. Skin:     General: Skin is warm and dry. Findings: No erythema or rash. Neurological:      General: No focal deficit present. Mental Status: He is alert and oriented to person, place, and time. Cranial Nerves: No cranial nerve deficit.       Motor: No abnormal muscle tone. Deep Tendon Reflexes: Reflexes are normal and symmetric. Reflexes normal.      Comments: Cranial nerves II through XII intact sensory and motor. Deep tendon reflexes in the biceps triceps knee and ankle are normal and bilaterally symmetrical.   Psychiatric:         Mood and Affect: Mood normal.         Behavior: Behavior normal.           ICD-10-CM ICD-9-CM    1. Long term (current) use of anticoagulants  Z79.01 V58.61 COLLECTION CAPILLARY BLOOD SPECIMEN      AMB POC PT/INR       Orders Placed This Encounter    COLLECTION CAPILLARY BLOOD SPECIMEN    AMB POC PT/INR       Follow-up and Dispositions    · Return in about 1 week (around 5/20/2021).          Marianne Gambino MD

## 2021-05-19 ENCOUNTER — OFFICE VISIT (OUTPATIENT)
Dept: FAMILY MEDICINE CLINIC | Age: 71
End: 2021-05-19
Payer: MEDICARE

## 2021-05-19 VITALS
RESPIRATION RATE: 18 BRPM | OXYGEN SATURATION: 98 % | DIASTOLIC BLOOD PRESSURE: 76 MMHG | TEMPERATURE: 98.2 F | WEIGHT: 196.38 LBS | HEART RATE: 76 BPM | SYSTOLIC BLOOD PRESSURE: 126 MMHG | HEIGHT: 68 IN | BODY MASS INDEX: 29.76 KG/M2

## 2021-05-19 DIAGNOSIS — Z79.01 LONG TERM (CURRENT) USE OF ANTICOAGULANTS: Primary | ICD-10-CM

## 2021-05-19 DIAGNOSIS — Z86.718 HISTORY OF DVT (DEEP VEIN THROMBOSIS): ICD-10-CM

## 2021-05-19 LAB
INR BLD: 2.7
PT POC: 32.7 SECONDS
VALID INTERNAL CONTROL?: YES

## 2021-05-19 PROCEDURE — G8536 NO DOC ELDER MAL SCRN: HCPCS | Performed by: FAMILY MEDICINE

## 2021-05-19 PROCEDURE — G8417 CALC BMI ABV UP PARAM F/U: HCPCS | Performed by: FAMILY MEDICINE

## 2021-05-19 PROCEDURE — 1101F PT FALLS ASSESS-DOCD LE1/YR: CPT | Performed by: FAMILY MEDICINE

## 2021-05-19 PROCEDURE — 85610 PROTHROMBIN TIME: CPT | Performed by: FAMILY MEDICINE

## 2021-05-19 PROCEDURE — 99213 OFFICE O/P EST LOW 20 MIN: CPT | Performed by: FAMILY MEDICINE

## 2021-05-19 PROCEDURE — G9717 DOC PT DX DEP/BP F/U NT REQ: HCPCS | Performed by: FAMILY MEDICINE

## 2021-05-19 PROCEDURE — 3017F COLORECTAL CA SCREEN DOC REV: CPT | Performed by: FAMILY MEDICINE

## 2021-05-19 PROCEDURE — G8427 DOCREV CUR MEDS BY ELIG CLIN: HCPCS | Performed by: FAMILY MEDICINE

## 2021-05-19 NOTE — PROGRESS NOTES
1. Have you been to the ER, urgent care clinic since your last visit? Hospitalized since your last visit? No    2. Have you seen or consulted any other health care providers outside of the [address] [place] [address] since your last visit? Include any pap smears or colon screening.  No     Chief Complaint   Patient presents with    Anticoagulation     Visit Vitals  /76 (BP 1 Location: Left arm, BP Patient Position: Sitting)   Pulse 76   Temp 98.2 °F (36.8 °C) (Temporal)   Resp 18   Ht 5' 8\" (1.727 m)   Wt 196 lb 6 oz (89.1 kg)   SpO2 98%   BMI 29.86 kg/m²

## 2021-05-19 NOTE — PROGRESS NOTES
Aric King is a 78 y.o. male who presents with the following:  Chief Complaint   Patient presents with    Anticoagulation       Patient is doing well on his current dose of warfarin taking 3 mg daily and his INR today was 2.7 but he is concerned that it will go too high if we do not monitor it closely therefore I suggested to the patient that he continue the current dose but we recheck his INR on Friday and if it jumps up into the threes then we will do 1 mg on Saturday and 1 mg on Sunday but if it stays in the upper twos we will just continue with the 3 mg daily dose. Patient seemed happy with this plan. Allergies   Allergen Reactions    Haldol [Haloperidol Lactate] Rash       Current Outpatient Medications   Medication Sig    warfarin (COUMADIN) 1 mg tablet TAKE 1 TABLET BY MOUTH EVERY DAY beginning after tooth extraction    enoxaparin (LOVENOX) 40 mg/0.4 mL 0.4 mL by SubCUTAneous route daily. Give injections in the morning but hold the Wednesday injection until after surgery    FLUoxetine (PROzac) 20 mg tablet Take 1 Tab by mouth daily.  fenofibrate (LOFIBRA) 54 mg tablet TAKE 1 TABLET BY MOUTH EVERY DAY WITH FOOD    mirtazapine (REMERON) 30 mg tablet Take 0.5-1 Tabs by mouth nightly.  tamsulosin (FLOMAX) 0.4 mg capsule Take 1 Cap by mouth daily.  omeprazole (PRILOSEC) 20 mg capsule TAKE 1 CAPSULE BY MOUTH ONCE A DAY 1/2 HOUR BEFORE BREAKFAST    hyoscyamine (ANASPAZ, LEVSIN) 0.125 mg tablet TAKE 1 TABLET BY MOUTH EVERY 4 HOURS AS NEEDED FOR PAIN    collagenase (SANTYL) 250 unit/gram ointment Apply  to affected area daily.  diazePAM (VALIUM) 10 mg tablet Take 1 Tab by mouth four (4) times daily as needed for Anxiety. Max Daily Amount: 40 mg.  polyethylene glycol (MIRALAX) 17 gram/dose powder Take 17 g by mouth daily. No current facility-administered medications for this visit.        Past Medical History:   Diagnosis Date    Anxiety     BPH (benign prostatic hyperplasia)     Chronic anticoagulation     Depression     DJD (degenerative joint disease)     DVT of leg (deep venous thrombosis) (HCC)     ED (erectile dysfunction)     GERD (gastroesophageal reflux disease)     Hypercoagulable state (HCC)     lupus coag    Impaired glucose tolerance        Past Surgical History:   Procedure Laterality Date    HX ENDOSCOPY  2011    GA ABDOMEN SURGERY PROC UNLISTED  2011    ischemic bowel d/t hypercoagulable state       Family History   Problem Relation Age of Onset    Heart Disease Father     Heart Disease Brother        Social History     Socioeconomic History    Marital status:      Spouse name: Not on file    Number of children: Not on file    Years of education: Not on file    Highest education level: Not on file   Tobacco Use    Smoking status: Never Smoker    Smokeless tobacco: Never Used   Substance and Sexual Activity    Alcohol use: No    Drug use: Yes     Types: Marijuana     Social Determinants of Health     Financial Resource Strain:     Difficulty of Paying Living Expenses:    Food Insecurity:     Worried About Running Out of Food in the Last Year:     Ran Out of Food in the Last Year:    Transportation Needs:     Lack of Transportation (Medical):  Lack of Transportation (Non-Medical):    Physical Activity:     Days of Exercise per Week:     Minutes of Exercise per Session:    Stress:     Feeling of Stress :    Social Connections:     Frequency of Communication with Friends and Family:     Frequency of Social Gatherings with Friends and Family:     Attends Islam Services:     Active Member of Clubs or Organizations:     Attends Club or Organization Meetings:     Marital Status:        Review of Systems   Constitutional: Negative for chills, fever, malaise/fatigue and weight loss. HENT: Negative for congestion, hearing loss, sore throat and tinnitus. Eyes: Negative for blurred vision, pain and discharge.    Respiratory: Negative for cough, shortness of breath and wheezing. Cardiovascular: Negative for chest pain, palpitations, orthopnea, claudication and leg swelling. Gastrointestinal: Negative for abdominal pain, constipation and heartburn. Genitourinary: Negative for dysuria, frequency and urgency. Musculoskeletal: Negative for falls, joint pain and myalgias. Skin: Negative for itching and rash. Neurological: Negative for dizziness, tingling, tremors and headaches. Endo/Heme/Allergies: Negative for environmental allergies and polydipsia. Does not bruise/bleed easily. Psychiatric/Behavioral: Negative for depression and substance abuse. The patient is not nervous/anxious. Visit Vitals  /76 (BP 1 Location: Left arm, BP Patient Position: Sitting)   Pulse 76   Temp 98.2 °F (36.8 °C) (Temporal)   Resp 18   Ht 5' 8\" (1.727 m)   Wt 196 lb 6 oz (89.1 kg)   SpO2 98%   BMI 29.86 kg/m²     Physical Exam  Vitals reviewed. Constitutional:       Appearance: Normal appearance. HENT:      Head: Normocephalic and atraumatic. Right Ear: Tympanic membrane, ear canal and external ear normal.      Left Ear: Tympanic membrane, ear canal and external ear normal.      Nose: Nose normal. No congestion or rhinorrhea. Mouth/Throat:      Mouth: Mucous membranes are moist.      Pharynx: No oropharyngeal exudate or posterior oropharyngeal erythema. Eyes:      Extraocular Movements: Extraocular movements intact. Conjunctiva/sclera: Conjunctivae normal.      Pupils: Pupils are equal, round, and reactive to light. Comments: Pupil iris and anterior chamber normal.   Neck:      Trachea: No tracheal deviation. Cardiovascular:      Rate and Rhythm: Normal rate and regular rhythm. Pulses: Normal pulses. Heart sounds: Normal heart sounds. No murmur heard. No friction rub. No gallop. Pulmonary:      Effort: Pulmonary effort is normal. No respiratory distress. Breath sounds: Normal breath sounds.  No wheezing, rhonchi or rales. Chest:      Chest wall: No tenderness. Abdominal:      General: Bowel sounds are normal. There is no distension. Palpations: Abdomen is soft. There is no mass. Tenderness: There is no abdominal tenderness. There is no guarding or rebound. Hernia: No hernia is present. Musculoskeletal:         General: No tenderness. Normal range of motion. Cervical back: Normal range of motion and neck supple. Lymphadenopathy:      Cervical: No cervical adenopathy. Skin:     General: Skin is warm and dry. Findings: No bruising, erythema or rash. Neurological:      General: No focal deficit present. Mental Status: He is alert and oriented to person, place, and time. Cranial Nerves: No cranial nerve deficit. Motor: No abnormal muscle tone. Deep Tendon Reflexes: Reflexes are normal and symmetric. Reflexes normal.      Comments: Cranial nerves II through XII intact sensory and motor. Deep tendon reflexes in the biceps triceps knee and ankle are normal and bilaterally symmetrical.   Psychiatric:         Mood and Affect: Mood normal.         Behavior: Behavior normal.         Thought Content: Thought content normal.         Judgment: Judgment normal.           ICD-10-CM ICD-9-CM    1. Long term (current) use of anticoagulants  Z79.01 V58.61 AMB POC PT/INR      COLLECTION CAPILLARY BLOOD SPECIMEN   2. History of DVT (deep vein thrombosis)  Z86.718 V12.51 AMB POC PT/INR      COLLECTION CAPILLARY BLOOD SPECIMEN       Orders Placed This Encounter    COLLECTION CAPILLARY BLOOD SPECIMEN    AMB POC PT/INR   Patient will return Friday for another INR and any medication adjustment that we need to make. Follow-up and Dispositions    · Return in about 2 days (around 5/21/2021).          Homer See MD

## 2021-05-20 ENCOUNTER — HOSPITAL ENCOUNTER (OUTPATIENT)
Dept: BEHAVIORAL/MENTAL HEALTH | Age: 71
Discharge: HOME | End: 2021-05-20
Payer: MEDICARE

## 2021-05-20 DIAGNOSIS — F41.1 GENERALIZED ANXIETY DISORDER: ICD-10-CM

## 2021-05-20 PROCEDURE — 99214 OFFICE O/P EST MOD 30 MIN: CPT | Performed by: PSYCHIATRY & NEUROLOGY

## 2021-05-20 RX ORDER — FLUOXETINE HYDROCHLORIDE 20 MG/1
20 CAPSULE ORAL DAILY
Qty: 90 CAPSULE | Refills: 3 | Status: SHIPPED | OUTPATIENT
Start: 2021-05-20 | End: 2021-09-07

## 2021-05-20 RX ORDER — DIAZEPAM 10 MG/1
10 TABLET ORAL
Qty: 120 TABLET | Refills: 5 | Status: SHIPPED | OUTPATIENT
Start: 2021-05-20 | End: 2021-05-21 | Stop reason: ALTCHOICE

## 2021-05-20 RX ORDER — FLUOXETINE 10 MG/1
10 CAPSULE ORAL DAILY
Qty: 90 CAPSULE | Refills: 3 | Status: SHIPPED | OUTPATIENT
Start: 2021-05-20 | End: 2021-09-07

## 2021-05-20 NOTE — PROGRESS NOTES
INTERVAL HISTORY (In Person):  Mr. Staci Jay is a 82-year-old  white male following up with me just 2.5 months since his last appointment regarding a longstanding history of severe Generalized Anxiety Disorder as well as a remote history of Depression, for which he has been in remission for some time. He has been maintained on Diazepam for over 45 years as noted below, with reasonable control of his anxiety noted. Maria Antonia Oliveros has historically been very sensitive to other medications including other benzos, and has been unable to tolerate many, many meds, but the Valium is something he has tolerated, and has utilized on a controlled basis.  Over the past several years we've also utilized Fluoxetine for the mild dysphoria he often has, and more recently we've used Mirtazapine to also help with sleep (and mood).      He comes in today stating that he's been feeling \"miserable\" overall, both physically and mentally. He's been more dysphoric overall, much of it due to various stressors including the state of the world politically and socially, the fear of running out of money, and his lack of female companionship. We discussed these issues further and he agrees that his N/V functioning has been slightly worse, but that he feels at least moderately more dysphoric now. We discussed Rx options and he's agreeable to increasing the Prozac to 30 mg daily (now using the caps). He's also struggling with his declining health, mostly orthopedic issues. Denies having any SI, however.  He still feels the Diazepam and Mirtazapine help his anxiety and insomnia.       CURRENT MEDICATIONS:  1.  Diazepam 10 mg qid prn--averages 3.5/day  2.  Fluoxetine 20 mg daily (20 mg caps)--taking 20 mg regularly  3.  Mirtazapine 15 mg qhs prn (30 mg)--taking most every night     MENTAL STATUS EXAM:  Mr. Staci Jay was noted to be a casually dressed, adequately groomed 82-year-old who appeared his stated age. He was fairly pleasant and cooperative, but slightly more irritable again, although less anxious than the last time. He still exhibits a slightly restricted affect, which is c/w his baseline.  He reported some worsening depressive symptoms, but he denied any feelings of hopeless or suicidal thinking. There was no evidence of any breana or hypomania, nor any symptoms of psychosis.  His anxiety is still evident but reasonably well controlled, overall.  His judgment and insight appear to be adequate, and his cognitive functioning appears to be fully intact with no deficits noted.       DIAGNOSTIC IMPRESSION:                     Axis I       Generalized Anxiety Disorder, moderate (F41.1)                  Major Depression, mild to moderate (F33.1)  Axis II      Deferred. Axis III     Degenerative joint disease; history of DVT; lupus; history of bowel resection; chronic pain issues.     PLAN:  1.  Continue the Diazepam at 10 mg qid prn--#120 with 5 refills (30 day). 2.  Increase the Prozac to 30 mg daily--#90 with 3 refills of the 20 and 10 mg caps (90 day)  3.  Continue the Mirtazapine at 15 mg qhs prn (30 mg)--has 4 months of refills (90 day). 4.  He will follow up within the next 6 months, sooner if needed.

## 2021-05-21 ENCOUNTER — OFFICE VISIT (OUTPATIENT)
Dept: FAMILY MEDICINE CLINIC | Age: 71
End: 2021-05-21
Payer: MEDICARE

## 2021-05-21 VITALS
DIASTOLIC BLOOD PRESSURE: 80 MMHG | BODY MASS INDEX: 29.73 KG/M2 | HEIGHT: 68 IN | HEART RATE: 68 BPM | RESPIRATION RATE: 20 BRPM | OXYGEN SATURATION: 97 % | TEMPERATURE: 98.2 F | SYSTOLIC BLOOD PRESSURE: 136 MMHG | WEIGHT: 196.2 LBS

## 2021-05-21 DIAGNOSIS — D68.59 HYPERCOAGULABLE STATE (HCC): ICD-10-CM

## 2021-05-21 DIAGNOSIS — Z79.01 LONG TERM (CURRENT) USE OF ANTICOAGULANTS: Primary | ICD-10-CM

## 2021-05-21 DIAGNOSIS — M62.838 MUSCLE SPASM: ICD-10-CM

## 2021-05-21 LAB
INR BLD: 2.3
PT POC: 25.2 SECONDS
VALID INTERNAL CONTROL?: YES

## 2021-05-21 PROCEDURE — G8536 NO DOC ELDER MAL SCRN: HCPCS | Performed by: FAMILY MEDICINE

## 2021-05-21 PROCEDURE — 1101F PT FALLS ASSESS-DOCD LE1/YR: CPT | Performed by: FAMILY MEDICINE

## 2021-05-21 PROCEDURE — 99213 OFFICE O/P EST LOW 20 MIN: CPT | Performed by: FAMILY MEDICINE

## 2021-05-21 PROCEDURE — 85610 PROTHROMBIN TIME: CPT | Performed by: FAMILY MEDICINE

## 2021-05-21 PROCEDURE — G8417 CALC BMI ABV UP PARAM F/U: HCPCS | Performed by: FAMILY MEDICINE

## 2021-05-21 PROCEDURE — G9717 DOC PT DX DEP/BP F/U NT REQ: HCPCS | Performed by: FAMILY MEDICINE

## 2021-05-21 PROCEDURE — 3017F COLORECTAL CA SCREEN DOC REV: CPT | Performed by: FAMILY MEDICINE

## 2021-05-21 PROCEDURE — G8427 DOCREV CUR MEDS BY ELIG CLIN: HCPCS | Performed by: FAMILY MEDICINE

## 2021-05-21 RX ORDER — CYCLOBENZAPRINE HCL 10 MG
10 TABLET ORAL
Qty: 90 TABLET | Refills: 1 | Status: SHIPPED | OUTPATIENT
Start: 2021-05-21 | End: 2022-03-10

## 2021-05-21 RX ORDER — WARFARIN 1 MG/1
TABLET ORAL
Qty: 270 TABLET | Refills: 1
Start: 2021-05-21 | End: 2021-09-07 | Stop reason: SDUPTHER

## 2021-05-21 NOTE — PROGRESS NOTES
Nelson Serrano is a 78 y.o. male who presents with the following:  Chief Complaint   Patient presents with    Anticoagulation    Spasms       Patient with hypercoagulable state is on 3 mg of warfarin daily and is in for an INR which was 2.3. Patient has been having some muscle spasms and would like to have cyclobenzaprine to take on a as needed basis. Patient states the medicine does not make him sleepy. Allergies   Allergen Reactions    Haldol [Haloperidol Lactate] Rash       Current Outpatient Medications   Medication Sig    warfarin (COUMADIN) 1 mg tablet TAKE 3 TABLETS BY MOUTH EVERY DAY    cyclobenzaprine (FLEXERIL) 10 mg tablet Take 1 Tablet by mouth three (3) times daily as needed for Muscle Spasm(s). Indications: muscle spasm    FLUoxetine (PROzac) 10 mg capsule Take 1 Capsule by mouth daily. Take with 20 mg cap--30 mg total    FLUoxetine (PROzac) 20 mg capsule Take 1 Capsule by mouth daily. Take with 10 mg cap--30 mg total    fenofibrate (LOFIBRA) 54 mg tablet TAKE 1 TABLET BY MOUTH EVERY DAY WITH FOOD    mirtazapine (REMERON) 30 mg tablet Take 0.5-1 Tabs by mouth nightly.  tamsulosin (FLOMAX) 0.4 mg capsule Take 1 Cap by mouth daily.  omeprazole (PRILOSEC) 20 mg capsule TAKE 1 CAPSULE BY MOUTH ONCE A DAY 1/2 HOUR BEFORE BREAKFAST    hyoscyamine (ANASPAZ, LEVSIN) 0.125 mg tablet TAKE 1 TABLET BY MOUTH EVERY 4 HOURS AS NEEDED FOR PAIN    collagenase (SANTYL) 250 unit/gram ointment Apply  to affected area daily.  polyethylene glycol (MIRALAX) 17 gram/dose powder Take 17 g by mouth daily. No current facility-administered medications for this visit.        Past Medical History:   Diagnosis Date    Anxiety     BPH (benign prostatic hyperplasia)     Chronic anticoagulation     Depression     DJD (degenerative joint disease)     [facility] of leg [facility] venous thrombosis) ([facility]     ED (erectile dysfunction)     GERD (gastroesophageal reflux disease)     Hypercoagulable state ([facility] lupus coag    Impaired glucose tolerance        Past Surgical History:   Procedure Laterality Date    HX ENDOSCOPY  2011    [place] ABDOMEN SURGERY [address] UNLISTED  2011    ischemic bowel d/t hypercoagulable state       Family History   Problem Relation Age of Onset    Heart Disease Father     Heart Disease Brother        Social History     Socioeconomic History    Marital status:      Spouse name: Not on file    Number of children: Not on file    Years of education: Not on file    Highest education level: Not on file   Tobacco Use    Smoking status: Never Smoker    Smokeless tobacco: Never Used   Substance and Sexual Activity    Alcohol use: No    Drug use: Yes     Types: Marijuana     Social Determinants of Health     Financial Resource Strain:     Difficulty of Paying Living Expenses:    Food Insecurity:     Worried About Running Out of Food in the Last Year:     Ran Out of Food in the Last Year:    Transportation Needs:     Lack of Transportation (Medical):  Lack of Transportation (Non-Medical):    Physical Activity:     Days of Exercise per Week:     Minutes of Exercise per Session:    Stress:     Feeling of Stress :    Social Connections:     Frequency of Communication with Friends and Family:     Frequency of Social Gatherings with Friends and Family:     Attends [employer] Services:     Active Member of Clubs or Organizations:     Attends Club or Organization Meetings:     Marital Status:        Review of Systems   Constitutional: Negative for chills, fever, malaise/fatigue and weight loss. HENT: Negative for congestion, hearing loss, sore throat and tinnitus. Eyes: Negative for blurred vision, pain and discharge. Respiratory: Negative for cough, shortness of breath and wheezing. Cardiovascular: Negative for chest pain, palpitations, orthopnea, claudication and leg swelling. Gastrointestinal: Negative for abdominal pain, constipation and heartburn.    Genitourinary: Negative for dysuria, frequency and urgency. Musculoskeletal: Positive for myalgias. Negative for falls and joint pain. Skin: Negative for itching and rash. Neurological: Negative for dizziness, tingling, tremors and headaches. Endo/Heme/Allergies: Negative for environmental allergies and polydipsia. Psychiatric/Behavioral: Negative for depression and substance abuse. The patient is not nervous/anxious. Visit Vitals  /80 (BP 1 Location: Left arm)   Pulse 68   Temp 98.2 °F (36.8 °C)   Resp 20   Ht 5' 8\" (1.727 m)   Wt 196 lb 3.2 oz (89 kg)   SpO2 97%   BMI 29.83 kg/m²     Physical Exam  Vitals reviewed. Constitutional:       Appearance: Normal appearance. HENT:      Head: Normocephalic and atraumatic. Right Ear: Tympanic membrane, ear canal and external ear normal.      Left Ear: Tympanic membrane, ear canal and external ear normal.      Nose: Nose normal. No congestion or rhinorrhea. Mouth/Throat:      Mouth: Mucous membranes are moist.      Pharynx: No oropharyngeal exudate or posterior oropharyngeal erythema. Eyes:      Extraocular Movements: Extraocular movements intact. Conjunctiva/sclera: Conjunctivae normal.      Pupils: Pupils are equal, round, and reactive to light. Comments: Pupil iris and anterior chamber normal.   Neck:      Trachea: No tracheal deviation. Cardiovascular:      Rate and Rhythm: Normal rate and regular rhythm. Pulses: Normal pulses. Heart sounds: Normal heart sounds. No murmur heard. No friction rub. No gallop. Pulmonary:      Effort: Pulmonary effort is normal. No respiratory distress. Breath sounds: Normal breath sounds. No wheezing, rhonchi or rales. Chest:      Chest wall: No tenderness. Abdominal:      General: Bowel sounds are normal. There is no distension. Palpations: Abdomen is soft. There is no mass. Tenderness: There is no abdominal tenderness. There is no guarding or rebound.       Hernia: No hernia is present. Musculoskeletal:         General: No tenderness. Normal range of motion. Cervical back: Normal range of motion and neck supple. Right lower leg: No edema. Left lower leg: No edema. Lymphadenopathy:      Cervical: No cervical adenopathy. Skin:     General: Skin is warm and dry. Findings: No bruising, erythema or rash. Neurological:      General: No focal deficit present. Mental Status: He is alert and oriented to person, place, and time. Cranial Nerves: No cranial nerve deficit. Motor: No abnormal muscle tone. Gait: Gait abnormal.      Deep Tendon Reflexes: Reflexes are normal and symmetric. Reflexes normal.      Comments: Cranial nerves II through XII intact sensory and motor. Deep tendon reflexes in the biceps triceps knee and ankle are normal and bilaterally symmetrical.   Psychiatric:         Mood and Affect: Mood normal.         Behavior: Behavior normal.         Thought Content: Thought content normal.         Judgment: Judgment normal.           ICD-10-CM ICD-9-CM    1. Long term (current) use of anticoagulants  Z79.01 V58.61 COLLECTION CAPILLARY BLOOD SPECIMEN      AMB POC PT/INR   2. [facility]co[facility] ([facility]  D68.59 289.81 warfarin (COUMADIN) 1 mg tablet   3. Muscle spasm  M62.838 728.85 cyclobenzaprine (FLEXERIL) 10 mg tablet       Orders Placed This Encounter    COLLECTION CAPILLARY BLOOD SPECIMEN    AMB POC PT/INR    warfarin (COUMADIN) 1 mg tablet     Sig: TAKE 3 TABLETS BY MOUTH EVERY DAY     Dispense:  270 Tablet     Refill:  1    cyclobenzaprine (FLEXERIL) 10 mg tablet     Sig: Take 1 Tablet by mouth three (3) times daily as needed for Muscle Spasm(s). Indications: muscle spasm     Dispense:  90 Tablet     Refill:  1       Follow-up and Dispositions    · Return in about 4 weeks (around 6/18/2021), or if symptoms worsen or fail to improve.          Franceen Rubinstein, MD

## 2021-07-08 ENCOUNTER — OFFICE VISIT (OUTPATIENT)
Dept: FAMILY MEDICINE CLINIC | Age: 71
End: 2021-07-08
Payer: MEDICARE

## 2021-07-08 VITALS
SYSTOLIC BLOOD PRESSURE: 131 MMHG | BODY MASS INDEX: 29.22 KG/M2 | TEMPERATURE: 98.1 F | WEIGHT: 192.8 LBS | DIASTOLIC BLOOD PRESSURE: 81 MMHG | HEART RATE: 76 BPM | HEIGHT: 68 IN | RESPIRATION RATE: 20 BRPM | OXYGEN SATURATION: 96 %

## 2021-07-08 DIAGNOSIS — D68.59 HYPERCOAGULABLE STATE (HCC): Primary | ICD-10-CM

## 2021-07-08 DIAGNOSIS — D68.62 LUPUS ANTICOAGULANT WITH HYPERCOAGULABLE STATE (HCC): ICD-10-CM

## 2021-07-08 PROCEDURE — 99213 OFFICE O/P EST LOW 20 MIN: CPT | Performed by: FAMILY MEDICINE

## 2021-07-08 PROCEDURE — G8417 CALC BMI ABV UP PARAM F/U: HCPCS | Performed by: FAMILY MEDICINE

## 2021-07-08 PROCEDURE — G8536 NO DOC ELDER MAL SCRN: HCPCS | Performed by: FAMILY MEDICINE

## 2021-07-08 PROCEDURE — G8427 DOCREV CUR MEDS BY ELIG CLIN: HCPCS | Performed by: FAMILY MEDICINE

## 2021-07-08 PROCEDURE — G9717 DOC PT DX DEP/BP F/U NT REQ: HCPCS | Performed by: FAMILY MEDICINE

## 2021-07-08 PROCEDURE — 3017F COLORECTAL CA SCREEN DOC REV: CPT | Performed by: FAMILY MEDICINE

## 2021-07-08 PROCEDURE — 1101F PT FALLS ASSESS-DOCD LE1/YR: CPT | Performed by: FAMILY MEDICINE

## 2021-07-08 RX ORDER — ENOXAPARIN SODIUM 100 MG/ML
40 INJECTION SUBCUTANEOUS DAILY
Qty: 7 SYRINGE | Refills: 0 | Status: SHIPPED | OUTPATIENT
Start: 2021-07-08 | End: 2021-07-15

## 2021-07-08 NOTE — PROGRESS NOTES
1. Have you been to the ER, urgent care clinic since your last visit? Hospitalized since your last visit?no    2. Have you seen or consulted any other health care providers outside of the [address] [place] [address] since your last visit? Include any pap smears or colon screening.  Yes dental

## 2021-07-08 NOTE — PROGRESS NOTES
Sabine Stewart is a 78 y.o. male who presents with the following:  Chief Complaint   Patient presents with    Anticoagulation    Dental Pain     needs letter for dentist that inr is ok for extraction       Patient has 2 teeth that need to be extracted and the patient's dentist will not do the procedure until his INR is less than 1.2 but as the patient has a hypercoagulable state and needs to be on anticoagulation to prevent thrombosis he would like to have a Lovenox bridge. I explained to the patient that a Lovenox bridge means that he would bleed as much as if he were on warfarin. The patient will return Wednesday for another INR after stopping his warfarin on Saturday. He would then have his tooth extracted on Friday and would restart his warfarin after the procedure. Allergies   Allergen Reactions    Haldol [Haloperidol Lactate] Rash       Current Outpatient Medications   Medication Sig    enoxaparin (LOVENOX) 40 mg/0.4 mL 0.4 mL by SubCUTAneous route daily for 7 days.  warfarin (COUMADIN) 1 mg tablet TAKE 3 TABLETS BY MOUTH EVERY DAY    cyclobenzaprine (FLEXERIL) 10 mg tablet Take 1 Tablet by mouth three (3) times daily as needed for Muscle Spasm(s). Indications: muscle spasm    fenofibrate (LOFIBRA) 54 mg tablet TAKE 1 TABLET BY MOUTH EVERY DAY WITH FOOD    mirtazapine (REMERON) 30 mg tablet Take 0.5-1 Tabs by mouth nightly.  tamsulosin (FLOMAX) 0.4 mg capsule Take 1 Cap by mouth daily.  omeprazole (PRILOSEC) 20 mg capsule TAKE 1 CAPSULE BY MOUTH ONCE A DAY 1/2 HOUR BEFORE BREAKFAST    hyoscyamine (ANASPAZ, LEVSIN) 0.125 mg tablet TAKE 1 TABLET BY MOUTH EVERY 4 HOURS AS NEEDED FOR PAIN    collagenase (SANTYL) 250 unit/gram ointment Apply  to affected area daily.  polyethylene glycol (MIRALAX) 17 gram/dose powder Take 17 g by mouth daily.  FLUoxetine (PROzac) 10 mg capsule Take 1 Capsule by mouth daily.  Take with 20 mg cap--30 mg total (Patient not taking: Reported on 7/8/2021)    FLUoxetine (PROzac) 20 mg capsule Take 1 Capsule by mouth daily. Take with 10 mg cap--30 mg total (Patient not taking: Reported on 7/8/2021)     No current facility-administered medications for this visit. Past Medical History:   Diagnosis Date    Anxiety     BPH (benign prostatic hyperplasia)     Chronic anticoagulation     Depression     DJD (degenerative joint disease)     [facility] thrombosis[facility] ([facility])     ED (erectile dysfunction)     GERD (gastroesophageal reflux disease)     Hypercoagulable state (HCC)     lupus coag    Impaired glucose tolerance        Past Surgical History:   Procedure Laterality Date    HX ENDOSCOPY  2011    MD ABDOMEN SURGERY PROC UNLISTED  2011    ischemic bowel d/t hypercoagulable state       Family History   Problem Relation Age of Onset    Heart Disease Father     Heart Disease Brother        Social History     Socioeconomic History    Marital status:      Spouse name: Not on file    Number of children: Not on file    Years of education: Not on file    Highest education level: Not on file   Tobacco Use    Smoking status: Never Smoker    Smokeless tobacco: Never Used   Substance and Sexual Activity    Alcohol use: No    Drug use: Yes     Types: Marijuana     Social Determinants of Health     Financial Resource Strain:     Difficulty of Paying Living Expenses:    Food Insecurity:     Worried About Running Out of Food in the Last Year:     Ran Out of Food in the Last Year:    Transportation Needs:     Lack of Transportation (Medical):      Lack of Transportation (Non-Medical):    Physical Activity:     Days of Exercise per Week:     Minutes of Exercise per Session:    Stress:     Feeling of Stress :    Social Connections:     Frequency of Communication with Friends and Family:     Frequency of Social Gatherings with Friends and Family:     Attends Judaism Services:     Active Member of Clubs or Organizations:     Attends Club or Organization Meetings:     Marital Status:        Review of Systems   Constitutional: Negative for chills, fever, malaise/fatigue and weight loss. HENT: Negative for congestion, hearing loss, sore throat and tinnitus. Eyes: Negative for blurred vision, pain and discharge. Respiratory: Negative for cough, shortness of breath and wheezing. Cardiovascular: Negative for chest pain, palpitations, orthopnea, claudication and leg swelling. Gastrointestinal: Negative for abdominal pain, constipation and heartburn. Genitourinary: Negative for dysuria, frequency and urgency. Musculoskeletal: Negative for falls, joint pain and myalgias. Skin: Negative for itching and rash. Neurological: Negative for dizziness, tingling, tremors and headaches. Endo/Heme/Allergies: Negative for environmental allergies and polydipsia. Psychiatric/Behavioral: Negative for depression and substance abuse. The patient is not nervous/anxious. Visit Vitals  /81 (BP 1 Location: Left arm)   Pulse 76   Temp 98.1 °F (36.7 °C)   Resp 20   Ht 5' 8\" (1.727 m)   Wt 192 lb 12.8 oz (87.5 kg)   SpO2 96%   BMI 29.32 kg/m²     Physical Exam  Vitals reviewed. Constitutional:       Appearance: Normal appearance. HENT:      Head: Normocephalic and atraumatic. Right Ear: Tympanic membrane, ear canal and external ear normal.      Left Ear: Tympanic membrane, ear canal and external ear normal.      Nose: Nose normal. No congestion or rhinorrhea. Mouth/Throat:      Mouth: Mucous membranes are moist.      Pharynx: No oropharyngeal exudate or posterior oropharyngeal erythema. Eyes:      Extraocular Movements: Extraocular movements intact. Conjunctiva/sclera: Conjunctivae normal.      Pupils: Pupils are equal, round, and reactive to light. Comments: Pupil iris and anterior chamber normal.   Neck:      Trachea: No tracheal deviation. Cardiovascular:      Rate and Rhythm: Normal rate and regular rhythm. Pulses: Normal pulses. Heart sounds: Normal heart sounds. No murmur heard. No friction rub. No gallop. Pulmonary:      Effort: Pulmonary effort is normal. No respiratory distress. Breath sounds: Normal breath sounds. No wheezing, rhonchi or rales. Chest:      Chest wall: No tenderness. Abdominal:      General: Bowel sounds are normal. There is no distension. Palpations: Abdomen is soft. There is no mass. Tenderness: There is no abdominal tenderness. There is no guarding or rebound. Hernia: No hernia is present. Musculoskeletal:         General: No tenderness. Normal range of motion. Cervical back: Normal range of motion and neck supple. Lymphadenopathy:      Cervical: No cervical adenopathy. Skin:     General: Skin is warm and dry. Findings: No erythema or rash. Neurological:      General: No focal deficit present. Mental Status: He is alert. Cranial Nerves: No cranial nerve deficit. Motor: No abnormal muscle tone. Deep Tendon Reflexes: Reflexes are normal and symmetric. Reflexes normal.      Comments: Cranial nerves II through XII intact sensory and motor. Deep tendon reflexes in the biceps triceps knee and ankle are normal and bilaterally symmetrical.   Psychiatric:         Mood and Affect: Mood normal.         Behavior: Behavior normal.         Thought Content: Thought content normal.         Judgment: Judgment normal.           ICD-10-CM ICD-9-CM    1. Hypercoagulable state ([facility]  D68.59 289.81    2. Lupus anticoagulant with hypercoagulable state ([facility]  D68.62 289.81 enoxaparin (LOVENOX) 40 mg/0.4 mL       Orders Placed This Encounter    enoxaparin (LOVENOX) 40 mg/0.4 mL     Si.4 mL by SubCUTAneous route daily for 7 days.      Dispense:  7 Syringe     Refill:  0   Will bridge with Lovenox for the 7 days the patient is off the warfarin and will note in the letter to the dentist that the patient is on a Lovenox bridge        SHERITA ORTEGA MD [place]

## 2021-07-14 ENCOUNTER — TELEPHONE (OUTPATIENT)
Dept: FAMILY MEDICINE CLINIC | Age: 71
End: 2021-07-14

## 2021-07-14 ENCOUNTER — OFFICE VISIT (OUTPATIENT)
Dept: FAMILY MEDICINE CLINIC | Age: 71
End: 2021-07-14
Payer: MEDICARE

## 2021-07-14 VITALS
TEMPERATURE: 97.3 F | RESPIRATION RATE: 18 BRPM | BODY MASS INDEX: 28.99 KG/M2 | WEIGHT: 191.25 LBS | HEIGHT: 68 IN | SYSTOLIC BLOOD PRESSURE: 122 MMHG | OXYGEN SATURATION: 97 % | DIASTOLIC BLOOD PRESSURE: 77 MMHG | HEART RATE: 72 BPM

## 2021-07-14 DIAGNOSIS — Z79.01 LONG TERM (CURRENT) USE OF ANTICOAGULANTS: ICD-10-CM

## 2021-07-14 DIAGNOSIS — D68.62 LUPUS ANTICOAGULANT WITH HYPERCOAGULABLE STATE (HCC): Primary | ICD-10-CM

## 2021-07-14 LAB
INR BLD: 1.3
PT POC: 15.5 SECONDS
VALID INTERNAL CONTROL?: YES

## 2021-07-14 PROCEDURE — G8427 DOCREV CUR MEDS BY ELIG CLIN: HCPCS | Performed by: FAMILY MEDICINE

## 2021-07-14 PROCEDURE — G9717 DOC PT DX DEP/BP F/U NT REQ: HCPCS | Performed by: FAMILY MEDICINE

## 2021-07-14 PROCEDURE — 99213 OFFICE O/P EST LOW 20 MIN: CPT | Performed by: FAMILY MEDICINE

## 2021-07-14 PROCEDURE — 1101F PT FALLS ASSESS-DOCD LE1/YR: CPT | Performed by: FAMILY MEDICINE

## 2021-07-14 PROCEDURE — 3017F COLORECTAL CA SCREEN DOC REV: CPT | Performed by: FAMILY MEDICINE

## 2021-07-14 PROCEDURE — 85610 PROTHROMBIN TIME: CPT | Performed by: FAMILY MEDICINE

## 2021-07-14 PROCEDURE — G8536 NO DOC ELDER MAL SCRN: HCPCS | Performed by: FAMILY MEDICINE

## 2021-07-14 PROCEDURE — G8417 CALC BMI ABV UP PARAM F/U: HCPCS | Performed by: FAMILY MEDICINE

## 2021-07-14 NOTE — TELEPHONE ENCOUNTER
Pt wants his PT/INR to be faxed to Dr. Hi Strange at [phone] today. Pt is having surgery on Friday.

## 2021-07-14 NOTE — PROGRESS NOTES
1. Have you been to the ER, urgent care clinic since your last visit? Hospitalized since your last visit? No    2. Have you seen or consulted any other health care providers outside of the [address] [place] [address] since your last visit? Include any pap smears or colon screening.  No

## 2021-07-14 NOTE — PROGRESS NOTES
Sourav Broussard is a 78 y.o. male who presents with the following:  Chief Complaint   Patient presents with    Anticoagulation       Patient is having oral surgery on Friday and his oral surgeon wanted his INR down below 1.3. Today his INR is 1.3 so by Friday as he is off his Coumadin it should be down less than that and is bridging with Lovenox and will continue to bridge after the surgery when he goes back 1 his warfarin 3 mg daily and with then 3 to 5 days he should be back to therapeutic. Allergies   Allergen Reactions    Haldol [Haloperidol Lactate] Rash       Current Outpatient Medications   Medication Sig    enoxaparin (LOVENOX) 40 mg/0.4 mL 0.4 mL by SubCUTAneous route daily for 7 days.  warfarin (COUMADIN) 1 mg tablet TAKE 3 TABLETS BY MOUTH EVERY DAY    cyclobenzaprine (FLEXERIL) 10 mg tablet Take 1 Tablet by mouth three (3) times daily as needed for Muscle Spasm(s). Indications: muscle spasm    fenofibrate (LOFIBRA) 54 mg tablet TAKE 1 TABLET BY MOUTH EVERY DAY WITH FOOD    mirtazapine (REMERON) 30 mg tablet Take 0.5-1 Tabs by mouth nightly.  tamsulosin (FLOMAX) 0.4 mg capsule Take 1 Cap by mouth daily.  omeprazole (PRILOSEC) 20 mg capsule TAKE 1 CAPSULE BY MOUTH ONCE A DAY 1/2 HOUR BEFORE BREAKFAST    hyoscyamine (ANASPAZ, LEVSIN) 0.125 mg tablet TAKE 1 TABLET BY MOUTH EVERY 4 HOURS AS NEEDED FOR PAIN    collagenase (SANTYL) 250 unit/gram ointment Apply  to affected area daily.  polyethylene glycol (MIRALAX) 17 gram/dose powder Take 17 g by mouth daily.  FLUoxetine (PROzac) 10 mg capsule Take 1 Capsule by mouth daily. Take with 20 mg cap--30 mg total (Patient not taking: Reported on 7/14/2021)    FLUoxetine (PROzac) 20 mg capsule Take 1 Capsule by mouth daily. Take with 10 mg cap--30 mg total (Patient not taking: Reported on 7/14/2021)     No current facility-administered medications for this visit.        Past Medical History:   Diagnosis Date    Anxiety     BPH (benign prostatic hyperplasia)     Chronic anticoagulation     Depression     DJD (degenerative joint disease)     DVT of leg (deep venous thrombosis) (HCC)     ED (erectile dysfunction)     GERD (gastroesophageal reflux disease)     Hypercoagulable state (HCC)     lupus coag    Impaired glucose tolerance        Past Surgical History:   Procedure Laterality Date    HX ENDOSCOPY  2011    NH ABDOMEN SURGERY PROC UNLISTED  2011    ischemic bowel d/t hypercoagulable state       Family History   Problem Relation Age of Onset    Heart Disease Father     Heart Disease Brother        Social History     Socioeconomic History    Marital status:      Spouse name: Not on file    Number of children: Not on file    Years of education: Not on file    Highest education level: Not on file   Tobacco Use    Smoking status: Never Smoker    Smokeless tobacco: Never Used   Substance and Sexual Activity    Alcohol use: No    Drug use: Yes     Types: Marijuana     Social Determinants of Health     Financial Resource Strain:     Difficulty of Paying Living Expenses:    Food Insecurity:     Worried About Running Out of Food in the Last Year:     Ran Out of Food in the Last Year:    Transportation Needs:     Lack of Transportation (Medical):  Lack of Transportation (Non-Medical):    Physical Activity:     Days of Exercise per Week:     Minutes of Exercise per Session:    Stress:     Feeling of Stress :    Social Connections:     Frequency of Communication with Friends and Family:     Frequency of Social Gatherings with Friends and Family:     Attends Oriental orthodox Services:     Active Member of Clubs or Organizations:     Attends Club or Organization Meetings:     Marital Status:        Review of Systems   Constitutional: Negative for chills, fever, malaise/fatigue and weight loss. HENT: Negative for congestion, hearing loss, sore throat and tinnitus. Eyes: Negative for blurred vision, pain and discharge. Respiratory: Negative for cough, shortness of breath and wheezing. Cardiovascular: Negative for chest pain, palpitations, orthopnea, claudication and leg swelling. Gastrointestinal: Negative for abdominal pain, constipation and heartburn. Genitourinary: Negative for dysuria, frequency and urgency. Musculoskeletal: Negative for falls, joint pain and myalgias. Skin: Negative for itching and rash. Neurological: Negative for dizziness, tingling, tremors and headaches. Endo/Heme/Allergies: Negative for environmental allergies and polydipsia. Does not bruise/bleed easily. Psychiatric/Behavioral: Negative for depression and substance abuse. The patient is not nervous/anxious. Visit Vitals  /77 (BP 1 Location: Left upper arm)   Pulse 72   Temp 97.3 °F (36.3 °C) (Temporal)   Resp 18   Ht 5' 8\" (1.727 m)   Wt 191 lb 4 oz (86.8 kg)   SpO2 97%   BMI 29.08 kg/m²     Physical Exam  Vitals reviewed. Constitutional:       Appearance: Normal appearance. HENT:      Head: Normocephalic and atraumatic. Comments: Poor dentition     Right Ear: Tympanic membrane, ear canal and external ear normal.      Left Ear: Tympanic membrane, ear canal and external ear normal.      Nose: Nose normal. No congestion or rhinorrhea. Mouth/Throat:      Mouth: Mucous membranes are moist.      Pharynx: No oropharyngeal exudate or posterior oropharyngeal erythema. Eyes:      Extraocular Movements: Extraocular movements intact. Conjunctiva/sclera: Conjunctivae normal.      Pupils: Pupils are equal, round, and reactive to light. Comments: Pupil iris and anterior chamber normal.   Neck:      Trachea: No tracheal deviation. Cardiovascular:      Rate and Rhythm: Normal rate and regular rhythm. Pulses: Normal pulses. Heart sounds: Normal heart sounds. No murmur heard. No friction rub. No gallop. Pulmonary:      Effort: Pulmonary effort is normal. No respiratory distress.       Breath sounds: Normal breath sounds. No wheezing, rhonchi or rales. Chest:      Chest wall: No tenderness. Abdominal:      General: Bowel sounds are normal. There is no distension. Palpations: Abdomen is soft. There is no mass. Tenderness: There is no abdominal tenderness. There is no guarding or rebound. Hernia: No hernia is present. Musculoskeletal:         General: No tenderness. Normal range of motion. Cervical back: Normal range of motion and neck supple. Lymphadenopathy:      Cervical: No cervical adenopathy. Skin:     General: Skin is warm and dry. Findings: No bruising, erythema or rash. Neurological:      General: No focal deficit present. Mental Status: He is alert and oriented to person, place, and time. Cranial Nerves: No cranial nerve deficit. Motor: No abnormal muscle tone. Deep Tendon Reflexes: Reflexes are normal and symmetric. Reflexes normal.      Comments: Cranial nerves II through XII intact sensory and motor. Deep tendon reflexes in the biceps triceps knee and ankle are normal and bilaterally symmetrical.   Psychiatric:         Mood and Affect: Mood normal.         Behavior: Behavior normal.           ICD-10-CM ICD-9-CM    1. Lupus anticoagulant with hypercoagulable state ([facility]  D68.62 289.81 COLLECTION CAPILLARY BLOOD SPECIMEN   2. Long term (current) use of anticoagulants  Z79.01 V58.61 AMB POC PT/INR       Orders Placed This Encounter    COLLECTION CAPILLARY BLOOD SPECIMEN    AMB POC PT/INR       Follow-up and Dispositions    · Return in about 4 weeks (around 8/11/2021) for PT/INR.          Ophelia Arciniega MD

## 2021-07-16 RX ORDER — ENOXAPARIN SODIUM 100 MG/ML
INJECTION SUBCUTANEOUS
Qty: 7 SYRINGE | Refills: 0 | Status: SHIPPED | OUTPATIENT
Start: 2021-07-16 | End: 2021-09-07

## 2021-07-16 RX ORDER — ENOXAPARIN SODIUM 100 MG/ML
INJECTION SUBCUTANEOUS
Qty: 7 SYRINGE | Refills: 0 | Status: SHIPPED | OUTPATIENT
Start: 2021-07-16 | End: 2021-07-16 | Stop reason: SDUPTHER

## 2021-07-16 NOTE — TELEPHONE ENCOUNTER
Requested Prescriptions     Signed Prescriptions Disp Refills    enoxaparin (LOVENOX) 40 mg/0.4 mL 7 Syringe 0     Si.4 mL by SubCUTAneous route daily for 7 days. Authorizing Provider: Ankur GREWAL     Ordering User: Drake Challenger     Sent to pharmacy per Telma Aguilera NP [facility]. First script cancelled as it was sent under SHANNEN Rojas and not Telma Aguilera NP. Corrected script sent.

## 2021-08-31 RX ORDER — TAMSULOSIN HYDROCHLORIDE 0.4 MG/1
CAPSULE ORAL
Qty: 90 CAPSULE | Refills: 1 | Status: SHIPPED | OUTPATIENT
Start: 2021-08-31 | End: 2021-11-30 | Stop reason: SDUPTHER

## 2021-09-07 ENCOUNTER — OFFICE VISIT (OUTPATIENT)
Dept: FAMILY MEDICINE CLINIC | Age: 71
End: 2021-09-07
Payer: MEDICARE

## 2021-09-07 VITALS
RESPIRATION RATE: 18 BRPM | HEIGHT: 68 IN | WEIGHT: 198.38 LBS | HEART RATE: 76 BPM | BODY MASS INDEX: 30.06 KG/M2 | SYSTOLIC BLOOD PRESSURE: 132 MMHG | TEMPERATURE: 97.6 F | OXYGEN SATURATION: 97 % | DIASTOLIC BLOOD PRESSURE: 88 MMHG

## 2021-09-07 DIAGNOSIS — M79.676 PAIN OF TOE, UNSPECIFIED LATERALITY: ICD-10-CM

## 2021-09-07 DIAGNOSIS — Z86.718 HISTORY OF DVT (DEEP VEIN THROMBOSIS): ICD-10-CM

## 2021-09-07 DIAGNOSIS — D68.62 LUPUS ANTICOAGULANT WITH HYPERCOAGULABLE STATE (HCC): ICD-10-CM

## 2021-09-07 DIAGNOSIS — E78.1 HYPERTRIGLYCERIDEMIA: ICD-10-CM

## 2021-09-07 DIAGNOSIS — D68.59 HYPERCOAGULABLE STATE (HCC): ICD-10-CM

## 2021-09-07 DIAGNOSIS — Z13.220 LIPID SCREENING: ICD-10-CM

## 2021-09-07 DIAGNOSIS — Z79.01 LONG TERM (CURRENT) USE OF ANTICOAGULANTS: Primary | ICD-10-CM

## 2021-09-07 LAB
INR BLD: 4.7
PT POC: 56.6 SECONDS
VALID INTERNAL CONTROL?: YES

## 2021-09-07 PROCEDURE — 85610 PROTHROMBIN TIME: CPT | Performed by: FAMILY MEDICINE

## 2021-09-07 PROCEDURE — 99213 OFFICE O/P EST LOW 20 MIN: CPT | Performed by: FAMILY MEDICINE

## 2021-09-07 RX ORDER — DIAZEPAM 10 MG/1
TABLET ORAL
COMMUNITY
Start: 2021-08-30 | End: 2021-12-21 | Stop reason: SDUPTHER

## 2021-09-07 RX ORDER — FENOFIBRATE 54 MG/1
TABLET ORAL
Qty: 90 TABLET | Refills: 1 | Status: SHIPPED | OUTPATIENT
Start: 2021-09-07 | End: 2022-06-14

## 2021-09-07 RX ORDER — WARFARIN 2 MG/1
TABLET ORAL
Qty: 90 TABLET | Refills: 0
Start: 2021-09-07 | End: 2021-11-30 | Stop reason: SDUPTHER

## 2021-09-07 NOTE — PROGRESS NOTES
Annalise Moreno  is a 78 y.o. male who presents with the following:  Chief Complaint   Patient presents with    Anticoagulation    Toe Pain       Patient came in complaining of toe pain but stated that it was getting better. Patient had stubbed his toe on something and it had been hurting and he was worried about having elevated blood sugars and injuring his toe and he did not want to have an amputation. Patient complains of no broken skin and the toe was actually getting better. The patient came in because of his anticoagulation and is currently taking 3 mg of warfarin daily and his INR was 4.7 so we have cut it down to 2 and I have advised that he eat some greens on a daily basis to get this down a bit. Patient also stated he needed a refill of his fenofibrate and wanted to have it checked but he was not fasting and I told him it would be best to be fasting for this. Allergies   Allergen Reactions    Haldol [Haloperidol Lactate] Rash       Current Outpatient Medications   Medication Sig    diazePAM (VALIUM) 10 mg tablet TAKE 1 TABLET BY MOUTH 4 TIMES A DAY AS NEEDED FOR ANXIETY MAX 40MG DAILY    warfarin (COUMADIN) 2 mg tablet TAKE 1 TABLET BY MOUTH EVERY DAY    fenofibrate (LOFIBRA) 54 mg tablet TAKE 1 TABLET BY MOUTH EVERY DAY WITH FOOD    tamsulosin (FLOMAX) 0.4 mg capsule TAKE 1 CAPSULE BY MOUTH EVERY DAY    cyclobenzaprine (FLEXERIL) 10 mg tablet Take 1 Tablet by mouth three (3) times daily as needed for Muscle Spasm(s). Indications: muscle spasm    mirtazapine (REMERON) 30 mg tablet Take 0.5-1 Tabs by mouth nightly.  omeprazole (PRILOSEC) 20 mg capsule TAKE 1 CAPSULE BY MOUTH ONCE A DAY 1/2 HOUR BEFORE BREAKFAST    hyoscyamine (ANASPAZ, LEVSIN) 0.125 mg tablet TAKE 1 TABLET BY MOUTH EVERY 4 HOURS AS NEEDED FOR PAIN    polyethylene glycol (MIRALAX) 17 gram/dose powder Take 17 g by mouth daily. No current facility-administered medications for this visit.        Past Medical History: Diagnosis Date    Anxiety     BPH (benign prostatic hyperplasia)     Chronic anticoagulation     Depression     DJD (degenerative joint disease)     [facility]     ED (erectile dysfunction)     GERD (gastroesophageal reflux disease)     Hypercoagulable state (HCC)     lupus coag    Impaired glucose tolerance        Past Surgical History:   Procedure Laterality Date    HX ENDOSCOPY  2011    VT ABDOMEN SURGERY PROC UNLISTED  2011    ischemic bowel d/t hypercoagulable state       Family History   Problem Relation Age of Onset    Heart Disease Father     Heart Disease Brother        Social History     Socioeconomic History    Marital status:      Spouse name: Not on file    Number of children: Not on file    Years of education: Not on file    Highest education level: Not on file   Tobacco Use    Smoking status: Never Smoker    Smokeless tobacco: Never Used   Substance and Sexual Activity    Alcohol use: No    Drug use: Yes     Types: Marijuana     Social Determinants of Health     Financial Resource Strain:     Difficulty of Paying Living Expenses:    Food Insecurity:     Worried About Running Out of Food in the Last Year:     Ran Out of Food in the Last Year:    Transportation Needs:     Lack of Transportation (Medical):  Lack of Transportation (Non-Medical):    Physical Activity:     Days of Exercise per Week:     Minutes of Exercise per Session:    Stress:     Feeling of Stress :    Social Connections:     Frequency of Communication with Friends and Family:     Frequency of Social Gatherings with Friends and Family:     Attends Nondenominational Services:     Active Member of Clubs or Organizations:     Attends Club or Organization Meetings:     Marital Status:        Review of Systems   Constitutional: Negative for chills, fever, malaise/fatigue and weight loss. HENT: Negative for congestion, hearing loss, sore throat and tinnitus.     Eyes: Negative for blurred vision, pain and discharge. Respiratory: Negative for cough, shortness of breath and wheezing. Cardiovascular: Negative for chest pain, palpitations, orthopnea, claudication and leg swelling. Gastrointestinal: Negative for abdominal pain, constipation and heartburn. Genitourinary: Negative for dysuria, frequency and urgency. Musculoskeletal: Negative for falls, joint pain and myalgias. Skin: Negative for itching and rash. Neurological: Negative for dizziness, tingling, tremors and headaches. Endo/Heme/Allergies: Negative for environmental allergies and polydipsia. Does not bruise/bleed easily. Psychiatric/Behavioral: Negative for depression and substance abuse. The patient is not nervous/anxious. Visit Vitals  /88 (BP 1 Location: Left upper arm)   Pulse 76   Temp 97.6 °F (36.4 °C) (Temporal)   Resp 18   Ht 5' 8\" (1.727 m)   Wt 198 lb 6 oz (90 kg)   SpO2 97%   BMI 30.16 kg/m²     Physical Exam  Vitals reviewed. Constitutional:       General: He is not in acute distress. Appearance: Normal appearance. He is obese. He is not ill-appearing. HENT:      Head: Normocephalic and atraumatic. Right Ear: Tympanic membrane, ear canal and external ear normal.      Left Ear: Tympanic membrane, ear canal and external ear normal.      Nose: Nose normal. No congestion or rhinorrhea. Mouth/Throat:      Mouth: Mucous membranes are moist.      Pharynx: No oropharyngeal exudate or posterior oropharyngeal erythema. Eyes:      Extraocular Movements: Extraocular movements intact. Conjunctiva/sclera: Conjunctivae normal.      Pupils: Pupils are equal, round, and reactive to light. Comments: Pupil iris and anterior chamber normal.   Neck:      Trachea: No tracheal deviation. Cardiovascular:      Rate and Rhythm: Normal rate and regular rhythm. Pulses: Normal pulses. Heart sounds: Normal heart sounds. No murmur heard. No friction rub. No gallop. Pulmonary:      Effort: Pulmonary effort is normal. No respiratory distress. Breath sounds: Normal breath sounds. No wheezing, rhonchi or rales. Chest:      Chest wall: No tenderness. Abdominal:      General: Bowel sounds are normal. There is no distension. Palpations: Abdomen is soft. There is no mass. Tenderness: There is no abdominal tenderness. There is no guarding or rebound. Hernia: No hernia is present. Musculoskeletal:         General: No tenderness. Normal range of motion. Cervical back: Normal range of motion and neck supple. Right lower leg: No edema. Left lower leg: No edema. Lymphadenopathy:      Cervical: No cervical adenopathy. Skin:     General: Skin is warm and dry. Findings: No erythema or rash. Neurological:      General: No focal deficit present. Mental Status: He is alert and oriented to person, place, and time. Cranial Nerves: No cranial nerve deficit. Motor: No abnormal muscle tone. Deep Tendon Reflexes: Reflexes are normal and symmetric. Reflexes normal.      Comments: Cranial nerves II through XII intact sensory and motor. Deep tendon reflexes in the biceps triceps knee and ankle are normal and bilaterally symmetrical.   Psychiatric:         Mood and Affect: Mood normal.         Behavior: Behavior normal.         Thought Content: Thought content normal.         Judgment: Judgment normal.           ICD-10-CM ICD-9-CM    1. Long term (current) use of anticoagulants  Z79.01 V58.61 COLLECTION CAPILLARY BLOOD SPECIMEN      AMB POC PT/INR   2. Lupus anticoagulant with hypercoagulable state ([facility]  D68.62 289.81    3. History of DVT (deep vein thrombosis)  Z86.718 V12.51    4. Pain of toe, unspecified laterality  M79.676 729.5    5. Hypercoagulable state ([facility]  D68.59 289.81 warfarin (COUMADIN) 2 mg tablet   6. Lipid screening  Z13.220 V77.91 fenofibrate (LOFIBRA) 54 mg tablet   7.  Hypertriglyceridemia  E78.1 272.1 fenofibrate (LOFIBRA) 54 mg tablet       Orders Placed This Encounter    COLLECTION CAPILLARY BLOOD SPECIMEN    AMB POC PT/INR    diazePAM (VALIUM) 10 mg tablet     Sig: TAKE 1 TABLET BY MOUTH 4 TIMES A DAY AS NEEDED FOR ANXIETY MAX 40MG DAILY    warfarin (COUMADIN) 2 mg tablet     Sig: TAKE 1 TABLET BY MOUTH EVERY DAY     Dispense:  90 Tablet     Refill:  0    fenofibrate (LOFIBRA) 54 mg tablet     Sig: TAKE 1 TABLET BY MOUTH EVERY DAY WITH FOOD     Dispense:  90 Tablet     Refill:  1   Patient's INR was probably up to 4.7 because of the addition of the fenofibrate and we will recheck this in another week to make sure it is coming down to normal.    Follow-up and Dispositions    · Return in about 1 week (around 9/14/2021), or if symptoms worsen or fail to improve.          Jimmy Ramírez MD

## 2021-09-07 NOTE — PROGRESS NOTES
1. Have you been to the ER, urgent care clinic since your last visit? Hospitalized since your last visit? No    2. Have you seen or consulted any other health care providers outside of the [address] [place] [address] since your last visit? Include any pap smears or colon screening.  Yes When: dentist 7-2021

## 2021-09-08 ENCOUNTER — APPOINTMENT (OUTPATIENT)
Dept: BEHAVIORAL/MENTAL HEALTH | Age: 71
End: 2021-09-08

## 2021-11-22 ENCOUNTER — HOSPITAL ENCOUNTER (OUTPATIENT)
Dept: BEHAVIORAL/MENTAL HEALTH | Age: 71
Discharge: HOME | End: 2021-11-22
Payer: MEDICARE

## 2021-11-22 PROCEDURE — 99214 OFFICE O/P EST MOD 30 MIN: CPT | Performed by: PSYCHIATRY & NEUROLOGY

## 2021-11-22 RX ORDER — MIRTAZAPINE 30 MG/1
30 TABLET, FILM COATED ORAL
Qty: 30 TABLET | Refills: 11 | Status: SHIPPED | OUTPATIENT
Start: 2021-11-22 | End: 2021-11-29 | Stop reason: SDUPTHER

## 2021-11-22 NOTE — PROGRESS NOTES
INTERVAL HISTORY (In Person):  Mr. Yamel Dykes is a 42-year-old  white male following up with me 6 months since his last appointment regarding a longstanding history of severe Generalized Anxiety Disorder as well as a remote history of Depression, for which he has been in remission for some time. He has been maintained on Diazepam for over 45 years as noted below, with reasonable control of his anxiety noted. [facility] has historically been very sensitive to other medications including other benzos, and has been unable to tolerate many, many meds, but the Valium is something he has tolerated, and has utilized on a controlled basis.  Over the past several years we've also utilized Fluoxetine for the mild dysphoria he often has, and more recently we've used Mirtazapine to also help with sleep (and mood).      He comes in today stating that he's been more stressed due to \"Paulette getting worse\" with re: to her psychiatric issues--getting a little more paranoid. It's led him to feel even more lonely, but that's been an ongoing issue for several years now. He's more worried overall, fearing that it may continue to worsen. We discussed her condition and ways he can try to assist her to take meds and help reduce her degree of paranoia. His depression seems to be better with very little overt dysphoria at this point. His N/V functioning has been better as well, and he denies any SE's with the meds.  However, he stopped taking the Prozac about 3 months ago, but hasn't had any worsening of his mood since then, possibly because he increased the dose of Remeron to 30 mg qhs.        CURRENT MEDICATIONS:  1.  Diazepam 10 mg qid prn--averages 3.5/day  2.  Fluoxetine 30 mg daily (20 and 10 mg caps)--stopped this ~3 months  3.  Mirtazapine 15 mg qhs prn (30 mg)--taking 30 mg nightly     MENTAL STATUS EXAM:  Mr. Yamel Dykes was noted to be a casually dressed, adequately groomed 42-year-old who appeared his stated age. He was fairly pleasant and cooperative, and not irritable this time at all. He still exhibits a slightly restricted affect, which is c/w his baseline.  He reported only very mild depressive symptoms now, and he denied any feelings of hopeless or suicidal thinking. There was no evidence of any breana or hypomania, nor any symptoms of psychosis.  His anxiety is still evident but reasonably well controlled, overall.  His judgment and insight appear to be air, and his cognitive functioning appeared to be slightly impaired.       DIAGNOSTIC IMPRESSION:                     Axis I       Generalized Anxiety Disorder, mild to moderate (F41.1)                  Major Depression, mild (F33.0)  Axis II      Deferred. Axis III     Degenerative joint disease; history of DVT; lupus; history of bowel resection; chronic pain issues.     PLAN:  1.  Continue the Diazepam at 10 mg qid prn--has some remaining (30 day).   2.  Continue off the .  3.  Continue the Mirtazapine at 30 mg qhs prn (30 mg)--#30 with 11 refills (30 day).   4.  He will follow up within the next 6 months, sooner if needed

## 2021-11-29 RX ORDER — MIRTAZAPINE 30 MG/1
30 TABLET, FILM COATED ORAL
Qty: 90 TABLET | Refills: 3 | Status: SHIPPED | OUTPATIENT
Start: 2021-11-29

## 2021-11-30 ENCOUNTER — OFFICE VISIT (OUTPATIENT)
Dept: FAMILY MEDICINE CLINIC | Age: 71
End: 2021-11-30
Payer: MEDICARE

## 2021-11-30 VITALS
HEIGHT: 68 IN | OXYGEN SATURATION: 98 % | TEMPERATURE: 98.1 F | DIASTOLIC BLOOD PRESSURE: 85 MMHG | SYSTOLIC BLOOD PRESSURE: 124 MMHG | BODY MASS INDEX: 30.92 KG/M2 | WEIGHT: 204 LBS | RESPIRATION RATE: 20 BRPM | HEART RATE: 76 BPM

## 2021-11-30 DIAGNOSIS — R35.0 BENIGN PROSTATIC HYPERPLASIA WITH URINARY FREQUENCY: Primary | ICD-10-CM

## 2021-11-30 DIAGNOSIS — Z86.718 HISTORY OF DVT (DEEP VEIN THROMBOSIS): ICD-10-CM

## 2021-11-30 DIAGNOSIS — N40.1 BENIGN PROSTATIC HYPERPLASIA WITH URINARY FREQUENCY: Primary | ICD-10-CM

## 2021-11-30 DIAGNOSIS — D68.59 HYPERCOAGULABLE STATE (HCC): ICD-10-CM

## 2021-11-30 DIAGNOSIS — Z79.01 LONG TERM (CURRENT) USE OF ANTICOAGULANTS: ICD-10-CM

## 2021-11-30 DIAGNOSIS — D68.62 LUPUS ANTICOAGULANT WITH HYPERCOAGULABLE STATE (HCC): ICD-10-CM

## 2021-11-30 PROCEDURE — 99213 OFFICE O/P EST LOW 20 MIN: CPT | Performed by: FAMILY MEDICINE

## 2021-11-30 RX ORDER — WARFARIN 3 MG/1
TABLET ORAL
Qty: 60 TABLET | Refills: 0 | Status: SHIPPED | OUTPATIENT
Start: 2021-11-30 | End: 2021-12-28 | Stop reason: SDUPTHER

## 2021-11-30 RX ORDER — TAMSULOSIN HYDROCHLORIDE 0.4 MG/1
0.4 CAPSULE ORAL DAILY
Qty: 90 CAPSULE | Refills: 1 | Status: SHIPPED | OUTPATIENT
Start: 2021-11-30 | End: 2022-03-24

## 2021-11-30 NOTE — PROGRESS NOTES
Nicholas Ramírez is a 73 y.o. male who presents with the following:  Chief Complaint   Patient presents with    Anticoagulation    Benign Prostatic Hypertrophy     Symptoms getting worse off of tamsulosin       Patient states that he has been eating a bit more greens than usual over Thanksgiving and his INR has dropped to 1.6 so we will try to compensate by increasing his warfarin to 3 mg daily and recheck it again in 3 weeks. Patient is also been having difficulty with BPH symptoms since he stopped his tamsulosin and request to go back on it. Allergies   Allergen Reactions    Haldol [Haloperidol Lactate] Rash       Current Outpatient Medications   Medication Sig    warfarin (COUMADIN) 3 mg tablet TAKE 1 TABLET BY MOUTH EVERY DAY  Indications: deep vein thrombosis prevention    tamsulosin (FLOMAX) 0.4 mg capsule Take 1 Capsule by mouth daily.  mirtazapine (REMERON) 30 mg tablet Take 1 Tablet by mouth nightly.  diazePAM (VALIUM) 10 mg tablet TAKE 1 TABLET BY MOUTH 4 TIMES A DAY AS NEEDED FOR ANXIETY MAX 40MG DAILY    fenofibrate (LOFIBRA) 54 mg tablet TAKE 1 TABLET BY MOUTH EVERY DAY WITH FOOD    cyclobenzaprine (FLEXERIL) 10 mg tablet Take 1 Tablet by mouth three (3) times daily as needed for Muscle Spasm(s). Indications: muscle spasm    omeprazole (PRILOSEC) 20 mg capsule TAKE 1 CAPSULE BY MOUTH ONCE A DAY 1/2 HOUR BEFORE BREAKFAST    hyoscyamine (ANASPAZ, LEVSIN) 0.125 mg tablet TAKE 1 TABLET BY MOUTH EVERY 4 HOURS AS NEEDED FOR PAIN    polyethylene glycol (MIRALAX) 17 gram/dose powder Take 17 g by mouth daily. No current facility-administered medications for this visit.        Past Medical History:   Diagnosis Date    Anxiety     BPH (benign prostatic hyperplasia)     Chronic anticoagulation     Depression     DJD (degenerative joint disease)     [facility]     ED (erectile dysfunction)     GERD (gastroesophageal reflux disease)     Hypercoagulable state ([facility]     lupus coag    Impaired glucose tolerance        Past Surgical History:   Procedure Laterality Date    HX ENDOSCOPY  2011    [place] ABDOMEN SURGERY [address] UNLISTED  2011    ischemic bowel d/t hypercoagulable state       Family History   Problem Relation Age of Onset    Heart Disease Father     Heart Disease Brother        Social History     Socioeconomic History    Marital status:    Tobacco Use    Smoking status: Never Smoker    Smokeless tobacco: Never Used   Substance and Sexual Activity    Alcohol use: No    Drug use: Yes     Types: Marijuana       Review of Systems   Constitutional: Negative for chills, fever, malaise/fatigue and weight loss. HENT: Negative for congestion, hearing loss, sore throat and tinnitus. Eyes: Negative for blurred vision, pain and discharge. Respiratory: Negative for cough, shortness of breath and wheezing. Cardiovascular: Negative for chest pain, palpitations, orthopnea, claudication and leg swelling. Gastrointestinal: Negative for abdominal pain, constipation and heartburn. Genitourinary: Positive for frequency. Negative for dysuria and urgency. Musculoskeletal: Negative for falls, joint pain and myalgias. Skin: Negative for itching and rash. Neurological: Negative for dizziness, tingling, tremors and headaches. Endo/Heme/Allergies: Negative for environmental allergies and polydipsia. Does not bruise/bleed easily. Psychiatric/Behavioral: Negative for depression and substance abuse. The patient is not nervous/anxious. Visit Vitals  /85 (BP 1 Location: Left arm)   Pulse 76   Temp 98.1 °F (36.7 °C)   Resp 20   Ht 5' 8\" (1.727 m)   Wt 204 lb (92.5 kg)   SpO2 98%   BMI 31.02 kg/m²     Physical Exam  Vitals reviewed. Constitutional:       General: He is not in acute distress. Appearance: Normal appearance. He is not ill-appearing. HENT:      Head: Normocephalic and atraumatic.       Right Ear: Tympanic membrane, ear canal and external ear normal.      Left Ear: Tympanic membrane, ear canal and external ear normal.      Nose: Nose normal. No congestion or rhinorrhea. Mouth/Throat:      Mouth: Mucous membranes are moist.      Pharynx: No oropharyngeal exudate or posterior oropharyngeal erythema. Eyes:      Extraocular Movements: Extraocular movements intact. Conjunctiva/sclera: Conjunctivae normal.      Pupils: Pupils are equal, round, and reactive to light. Comments: Pupil iris and anterior chamber normal.   Neck:      Trachea: No tracheal deviation. Cardiovascular:      Rate and Rhythm: Normal rate and regular rhythm. Pulses: Normal pulses. Heart sounds: Normal heart sounds. No murmur heard. No friction rub. No gallop. Pulmonary:      Effort: Pulmonary effort is normal. No respiratory distress. Breath sounds: Normal breath sounds. No wheezing, rhonchi or rales. Chest:      Chest wall: No tenderness. Abdominal:      General: Bowel sounds are normal. There is no distension. Palpations: Abdomen is soft. There is no mass. Tenderness: There is no abdominal tenderness. There is no guarding or rebound. Hernia: No hernia is present. Musculoskeletal:         General: No tenderness. Normal range of motion. Cervical back: Normal range of motion and neck supple. Right lower leg: No edema. Left lower leg: No edema. Lymphadenopathy:      Cervical: No cervical adenopathy. Skin:     General: Skin is warm and dry. Findings: No erythema or rash. Neurological:      General: No focal deficit present. Mental Status: He is alert and oriented to person, place, and time. Cranial Nerves: No cranial nerve deficit. Motor: No abnormal muscle tone. Deep Tendon Reflexes: Reflexes are normal and symmetric. Reflexes normal.      Comments: Cranial nerves II through XII intact sensory and motor.   Deep tendon reflexes in the biceps triceps knee and ankle are normal and bilaterally symmetrical.   Psychiatric:         Mood and Affect: Mood normal.         Behavior: Behavior normal.         Thought Content: Thought content normal.         Judgment: Judgment normal.           ICD-10-CM ICD-9-CM    1. Benign prostatic hyperplasia with urinary frequency  N40.1 600.01 tamsulosin (FLOMAX) 0.4 mg capsule    R35.0 788.41    2. History of DVT (deep vein thrombosis)  Z86.718 V12.51    3. Long term (current) use of anticoagulants  Z79.01 V58.61    4. Lupus anticoagulant with hypercoagulable state ([facility]  D68.62 289.81    5. Hypercoagulable state ([facility]  D68.59 289.81 warfarin (COUMADIN) 3 mg tablet       Orders Placed This Encounter    warfarin (COUMADIN) 3 mg tablet     Sig: TAKE 1 TABLET BY MOUTH EVERY DAY  Indications: deep vein thrombosis prevention     Dispense:  60 Tablet     Refill:  0    tamsulosin (FLOMAX) 0.4 mg capsule     Sig: Take 1 Capsule by mouth daily. Dispense:  90 Capsule     Refill:  1       Follow-up and Dispositions    · Return in about 3 weeks (around 12/21/2021) for Recheck INR and inject his trigger thumb.          Holger Marina MD

## 2021-12-07 RX ORDER — WARFARIN 2 MG/1
TABLET ORAL
Qty: 30 TABLET | Refills: 1 | Status: SHIPPED | OUTPATIENT
Start: 2021-12-07 | End: 2021-12-28 | Stop reason: SDUPTHER

## 2021-12-21 DIAGNOSIS — F41.1 GENERALIZED ANXIETY DISORDER: Primary | ICD-10-CM

## 2021-12-21 RX ORDER — DIAZEPAM 10 MG/1
10 TABLET ORAL
Qty: 120 TABLET | Refills: 5 | Status: SHIPPED | OUTPATIENT
Start: 2021-12-21 | End: 2022-07-11 | Stop reason: SDUPTHER

## 2021-12-28 ENCOUNTER — OFFICE VISIT (OUTPATIENT)
Dept: FAMILY MEDICINE CLINIC | Age: 71
End: 2021-12-28
Payer: MEDICARE

## 2021-12-28 VITALS
BODY MASS INDEX: 31.19 KG/M2 | TEMPERATURE: 98.2 F | DIASTOLIC BLOOD PRESSURE: 85 MMHG | HEART RATE: 90 BPM | WEIGHT: 205.8 LBS | RESPIRATION RATE: 20 BRPM | HEIGHT: 68 IN | OXYGEN SATURATION: 98 % | SYSTOLIC BLOOD PRESSURE: 138 MMHG

## 2021-12-28 DIAGNOSIS — M77.8 TENDINITIS OF RIGHT WRIST: ICD-10-CM

## 2021-12-28 DIAGNOSIS — D68.59 HYPERCOAGULABLE STATE (HCC): ICD-10-CM

## 2021-12-28 DIAGNOSIS — Z79.01 LONG TERM (CURRENT) USE OF ANTICOAGULANTS: Primary | ICD-10-CM

## 2021-12-28 DIAGNOSIS — D68.62 LUPUS ANTICOAGULANT WITH HYPERCOAGULABLE STATE (HCC): ICD-10-CM

## 2021-12-28 DIAGNOSIS — Z86.718 HISTORY OF DVT (DEEP VEIN THROMBOSIS): ICD-10-CM

## 2021-12-28 LAB
INR BLD: 3.9
PT POC: 45.6 SECONDS
VALID INTERNAL CONTROL?: YES

## 2021-12-28 PROCEDURE — 20552 NJX 1/MLT TRIGGER POINT 1/2: CPT | Performed by: FAMILY MEDICINE

## 2021-12-28 PROCEDURE — 85610 PROTHROMBIN TIME: CPT | Performed by: FAMILY MEDICINE

## 2021-12-28 PROCEDURE — 99213 OFFICE O/P EST LOW 20 MIN: CPT | Performed by: FAMILY MEDICINE

## 2021-12-28 RX ORDER — WARFARIN 3 MG/1
TABLET ORAL
Qty: 60 TABLET | Refills: 0
Start: 2021-12-28 | End: 2022-01-31

## 2021-12-28 RX ORDER — TRIAMCINOLONE ACETONIDE 40 MG/ML
40 INJECTION, SUSPENSION INTRA-ARTICULAR; INTRAMUSCULAR ONCE
Status: CANCELLED | OUTPATIENT
Start: 2021-12-28 | End: 2021-12-28

## 2021-12-28 RX ORDER — WARFARIN 2 MG/1
TABLET ORAL
Qty: 30 TABLET | Refills: 1
Start: 2021-12-28 | End: 2022-01-04

## 2021-12-28 NOTE — PATIENT INSTRUCTIONS
Learning About Trigger Point Injections  What are trigger point injections? A trigger point is a painful knot in a tight band of muscle. A trigger point often causes pain to be felt in other areas, too. For example, a trigger point in the neck or upper back can cause pain in the head. Trigger point injections are shots of medicine into these knots to help relieve the pain. The medicines are usually local anesthetics like lidocaine. Trigger point injections are often part of plan that includes other treatments, such as muscle stretching and strengthening. How is a trigger point injection done? Your doctor first locates a trigger point by pressing around the painful area. This may cause your muscle to hurt or twitch. This tells the doctor that it's the right spot to do the injection. The area is cleaned. Your doctor then injects the medicine into the trigger point. The doctor may inject the medicine in more than one direction within the trigger point. The doctor may change direction without removing the needle. If you have more than one trigger point in the muscle, your doctor may repeat the process. Your doctor may stretch the area to help the muscle relax. You may be shown how to move and stretch the muscle yourself. How long does a trigger point injection take? The procedure takes about 10 to 30 minutes. How long it takes depends on how many trigger points are treated. But the injection itself takes only a few moments. What can you expect after a trigger point injection? The area may feel a bit numb for a few hours. It may also feel sore. Other problems from trigger point injections are rare. There is a chance of a skin infection at the injection site. And if injections are done in the chest area, there is a small risk of puncturing the outer lining of the lung (pneumothorax). Trigger point injections may reduce some or all of your pain. But the pain can come back after the medicine wears off. If your pain comes back, your doctor may suggest more shots or other treatment for longer-lasting relief. Follow your doctor's instructions carefully. And tell your doctor about any new or unusual symptoms, such as chest pain or shortness of breath. Follow-up care is a key part of your treatment and safety. Be sure to make and go to all appointments, and call your doctor if you are having problems. It's also a good idea to know your test results and keep a list of the medicines you take. Where can you learn more? Go to http://www.gray.com/  Enter V425 in the search box to learn more about \"Learning About Trigger Point Injections. \"  Current as of: July 1, 2021               Content Version: 13.0  © 2006-2021 [employer]. Care instructions adapted under license by [employer] (which disclaims liability or warranty for this information). If you have questions about a medical condition or this instruction, always ask your healthcare professional. [address] any warranty or liability for your use of this information.

## 2021-12-28 NOTE — PROGRESS NOTES
Morenita La is a 73 y.o. male who presents with the following:  Chief Complaint   Patient presents with    Anticoagulation    Wrist Pain     Right       Patient comes in for an anticoagulation check and his INR was slightly elevated at 3.9 and I have suggested that in general he should go back to 2 mg on Mondays and Thursdays and 3 mg on the other day. However, the patient wanted an injection for pain in his right wrist for a tendinitis he had developed an overuse injury as a . I told him because the steroid would tend to drive his INR up I suggested that for 1 week he should only take the 2 mg which by themselves have proved an adequate but after a week he should renew his regular course of medication which would be taking the 2 mg on Monday and Thursday and the 3 mg on the other days. Patient should report to us any unusual bruising or bleeding. The patient states that he has been having some difficulties for about a week with the right wrist at the thumb when he uses the thumb or bends his wrist the area just proximal to the wrist will cause him some pain and he has had success with injections and relieving the pain for up to a year. Allergies   Allergen Reactions    Haldol [Haloperidol Lactate] Rash       Current Outpatient Medications   Medication Sig    warfarin (COUMADIN) 2 mg tablet Take 1 tablet on Monday and 1 tablet on Thursday    warfarin (COUMADIN) 3 mg tablet TAKE 1 TABLET BY MOUTH EVERY DAY except Monday and Thursday  Indications: deep vein thrombosis prevention    diazePAM (VALIUM) 10 mg tablet Take 1 Tablet by mouth four (4) times daily as needed for Anxiety. Max Daily Amount: 40 mg.    tamsulosin (FLOMAX) 0.4 mg capsule Take 1 Capsule by mouth daily.  mirtazapine (REMERON) 30 mg tablet Take 1 Tablet by mouth nightly.     fenofibrate (LOFIBRA) 54 mg tablet TAKE 1 TABLET BY MOUTH EVERY DAY WITH FOOD    cyclobenzaprine (FLEXERIL) 10 mg tablet Take 1 Tablet by mouth three (3) times daily as needed for Muscle Spasm(s). Indications: muscle spasm    omeprazole (PRILOSEC) 20 mg capsule TAKE 1 CAPSULE BY MOUTH ONCE A DAY 1/2 HOUR BEFORE BREAKFAST    hyoscyamine (ANASPAZ, LEVSIN) 0.125 mg tablet TAKE 1 TABLET BY MOUTH EVERY 4 HOURS AS NEEDED FOR PAIN    polyethylene glycol (MIRALAX) 17 gram/dose powder Take 17 g by mouth daily. No current facility-administered medications for this visit. Past Medical History:   Diagnosis Date    Anxiety     BPH (benign prostatic hyperplasia)     Chronic anticoagulation     Depression     DJD (degenerative joint disease)     DVT of leg [facility]deep venous thrombosis) ([facility]     ED (erectile dysfunction)     GERD (gastroesophageal reflux disease)     Hypercoagulable state ([facility])     lupus coag    Impaired glucose tolerance        Past Surgical History:   Procedure Laterality Date    HX ENDOSCOPY  2011    [place] ABDOMEN SURGERY PROC UNLISTED  2011    ischemic bowel d/t hypercoagulable state       Family History   Problem Relation Age of Onset    Heart Disease Father     Heart Disease Brother        Social History     Socioeconomic History    Marital status:    Tobacco Use    Smoking status: Never Smoker    Smokeless tobacco: Never Used   Substance and Sexual Activity    Alcohol use: No    Drug use: Yes     Types: Marijuana       Review of Systems   Constitutional: Negative for chills, fever, malaise/fatigue and weight loss. HENT: Negative for congestion, hearing loss, sore throat and tinnitus. Eyes: Negative for blurred vision, pain and discharge. Respiratory: Negative for cough, shortness of breath and wheezing. Cardiovascular: Negative for chest pain, palpitations, orthopnea, claudication and leg swelling. Gastrointestinal: Negative for abdominal pain, constipation and heartburn. Genitourinary: Negative for dysuria, frequency and urgency. Musculoskeletal: Negative for falls, joint pain and myalgias.         Right wrist pain   Skin: Negative for itching and rash. Neurological: Negative for dizziness, tingling, tremors and headaches. Endo/Heme/Allergies: Negative for environmental allergies and polydipsia. Psychiatric/Behavioral: Negative for depression and substance abuse. The patient is not nervous/anxious. Visit Vitals  /85   Pulse 90   Temp 98.2 °F (36.8 °C)   Resp 20   Ht 5' 8\" (1.727 m)   Wt 205 lb 12.8 oz (93.4 kg)   SpO2 98%   BMI 31.29 kg/m²     Physical Exam  Vitals reviewed. Constitutional:       General: He is not in acute distress. Appearance: Normal appearance. He is not ill-appearing. HENT:      Head: Normocephalic and atraumatic. Right Ear: Tympanic membrane, ear canal and external ear normal.      Left Ear: Tympanic membrane, ear canal and external ear normal.      Nose: Nose normal. No congestion or rhinorrhea. Mouth/Throat:      Mouth: Mucous membranes are moist.      Pharynx: No oropharyngeal exudate or posterior oropharyngeal erythema. Eyes:      Extraocular Movements: Extraocular movements intact. Conjunctiva/sclera: Conjunctivae normal.      Pupils: Pupils are equal, round, and reactive to light. Comments: Pupil iris and anterior chamber normal.   Neck:      Trachea: No tracheal deviation. Cardiovascular:      Rate and Rhythm: Normal rate and regular rhythm. Pulses: Normal pulses. Heart sounds: Normal heart sounds. No murmur heard. No friction rub. No gallop. Pulmonary:      Effort: Pulmonary effort is normal. No respiratory distress. Breath sounds: Normal breath sounds. No wheezing, rhonchi or rales. Chest:      Chest wall: No tenderness. Abdominal:      General: Bowel sounds are normal. There is no distension. Palpations: Abdomen is soft. There is no mass. Tenderness: There is no abdominal tenderness. There is no guarding or rebound. Hernia: No hernia is present.    Musculoskeletal:         General: Tenderness (Patient is tender proximal to the right wrist on the radial side.) present. Normal range of motion. Cervical back: Normal range of motion and neck supple. Lymphadenopathy:      Cervical: No cervical adenopathy. Skin:     General: Skin is warm and dry. Findings: No erythema or rash. Neurological:      General: No focal deficit present. Mental Status: He is alert and oriented to person, place, and time. Cranial Nerves: No cranial nerve deficit. Motor: No abnormal muscle tone. Deep Tendon Reflexes: Reflexes are normal and symmetric. Reflexes normal.      Comments: Cranial nerves II through XII intact sensory and motor. Deep tendon reflexes in the biceps triceps knee and ankle are normal and bilaterally symmetrical.   Psychiatric:         Mood and Affect: Mood normal.         Behavior: Behavior normal.         Thought Content: Thought content normal.         Judgment: Judgment normal.           ICD-10-CM ICD-9-CM    1. Long term (current) use of anticoagulants  Z79.01 V58.61 COLLECTION CAPILLARY BLOOD SPECIMEN      AMB POC PT/INR   2. Lupus anticoagulant with hypercoagulable state ([facility]  D68.62 289.81    3. History of DVT (deep vein thrombosis)  Z86.718 V12.51    4. Tendinitis of right wrist  M77.8 727.05 INJECT TRIGGER POINT, 1 OR 2   5.  Hypercoagulable state ([facility]  D68.59 289.81 warfarin (COUMADIN) 3 mg tablet       Orders Placed This Encounter    COLLECTION CAPILLARY BLOOD SPECIMEN    INJECT TRIGGER POINT, 1 OR 2    AMB POC PT/INR    warfarin (COUMADIN) 2 mg tablet     Sig: Take 1 tablet on Monday and 1 tablet on Thursday     Dispense:  30 Tablet     Refill:  1    warfarin (COUMADIN) 3 mg tablet     Sig: TAKE 1 TABLET BY MOUTH EVERY DAY except Monday and Thursday  Indications: deep vein thrombosis prevention     Dispense:  60 Tablet     Refill:  0   the procedure of trigger point injection was explained to the pt including risks/benefits and the pt elected to proceed so the skin over the distal forearm of the right wrist on the radial side to be injected was cleaned with alcohol and one ml [40 mg Kenalog] was injected into the bursa and anesthesia was obtained with 1% lidocaine 2 ml total-the pt tolerated the procedure well and ambulated out of the clinic on his own      Follow-up and Dispositions    · Return in about 4 weeks (around 1/25/2022).          Carrie Castanon MD

## 2021-12-28 NOTE — PROGRESS NOTES
[facility] PRIMARY CARE AT [id number] [facility]  OFFICE PROCEDURE PROGRESS NOTE        Chart reviewed for the following:   Jossue Nguyen LPN, have reviewed the History, Physical and updated the Allergic reactions for [facility]     TIME OUT performed immediately prior to start of procedure:   Jossue Nguyen LPN, have performed the following reviews on Emanuel Gaffney prior to the start of the procedure:            * Patient was identified by name and date of birth   * Agreement on procedure being performed was verified  * Risks and Benefits explained to the patient by Rita Loving MD  * Procedure site verified and marked as necessary  * Patient was positioned for comfort  * Consent was signed and verified     Time: 353pm      Date of procedure: 12/28/2021    Procedure performed by:  Rita Loving MD    Provider assisted by: none     Patient assisted by: self    Pre Procedural Pain Scale: 4    Procedure start time:  351pm      How tolerated by patient: tolerated the procedure well with no complications    Post Procedural Pain Scale: 0 - No Hurt    Comments: none    Post op instructions and patient education reviewed. Patient states understanding.     Copy of discharge instructions given to patient in AVS.

## 2021-12-28 NOTE — PROGRESS NOTES
1. Have you been to the ER, urgent care clinic since your last visit? Hospitalized since your last visit?no    2. Have you seen or consulted any other health care providers outside of the [address] [place] [address] since your last visit? Include any pap smears or colon screening.  Yes dr Mino Akers physciatrist

## 2021-12-29 RX ORDER — TRIAMCINOLONE ACETONIDE 40 MG/ML
40 INJECTION, SUSPENSION INTRA-ARTICULAR; INTRAMUSCULAR ONCE
Status: COMPLETED | OUTPATIENT
Start: 2021-12-29 | End: 2021-12-29

## 2021-12-29 RX ADMIN — TRIAMCINOLONE ACETONIDE 40 MG: 40 INJECTION, SUSPENSION INTRA-ARTICULAR; INTRAMUSCULAR at 06:42

## 2022-01-04 RX ORDER — WARFARIN 2 MG/1
TABLET ORAL
Qty: 30 TABLET | Refills: 1 | Status: SHIPPED | OUTPATIENT
Start: 2022-01-04 | End: 2022-01-20 | Stop reason: SDUPTHER

## 2022-01-13 ENCOUNTER — TELEPHONE (OUTPATIENT)
Dept: FAMILY MEDICINE CLINIC | Age: 72
End: 2022-01-13

## 2022-01-13 NOTE — TELEPHONE ENCOUNTER
The patient says he Sneezed \"violently\", knocked his one last tooth out and thinks he needs an antibx just in case, he called dental clinic they won't see him until February

## 2022-01-20 RX ORDER — WARFARIN 2 MG/1
TABLET ORAL
Qty: 90 TABLET | Refills: 0 | Status: SHIPPED | OUTPATIENT
Start: 2022-01-20 | End: 2022-05-05 | Stop reason: SDUPTHER

## 2022-01-20 RX ORDER — WARFARIN 2 MG/1
TABLET ORAL
Qty: 30 TABLET | Refills: 1 | Status: SHIPPED | OUTPATIENT
Start: 2022-01-20 | End: 2022-01-20 | Stop reason: SDUPTHER

## 2022-01-20 NOTE — TELEPHONE ENCOUNTER
Patient requesting refill on     Requested Prescriptions     Pending Prescriptions Disp Refills    warfarin (COUMADIN) 2 mg tablet 90 Tablet 0     Sig: TAKE 1 TABLET BY MOUTH EVERY DAY         Last OV 12/28/2021

## 2022-01-31 DIAGNOSIS — D68.59 HYPERCOAGULABLE STATE (HCC): ICD-10-CM

## 2022-01-31 RX ORDER — WARFARIN 3 MG/1
TABLET ORAL
Qty: 60 TABLET | Refills: 0 | Status: SHIPPED | OUTPATIENT
Start: 2022-01-31 | End: 2022-03-24

## 2022-02-11 RX ORDER — OMEPRAZOLE 20 MG/1
CAPSULE, DELAYED RELEASE ORAL
Qty: 90 CAPSULE | Refills: 1 | Status: SHIPPED | OUTPATIENT
Start: 2022-02-11 | End: 2022-08-03

## 2022-03-03 ENCOUNTER — OFFICE VISIT (OUTPATIENT)
Dept: FAMILY MEDICINE CLINIC | Age: 72
End: 2022-03-03
Payer: MEDICARE

## 2022-03-03 VITALS
HEIGHT: 68 IN | TEMPERATURE: 98.4 F | HEART RATE: 79 BPM | BODY MASS INDEX: 31.64 KG/M2 | OXYGEN SATURATION: 96 % | DIASTOLIC BLOOD PRESSURE: 87 MMHG | SYSTOLIC BLOOD PRESSURE: 147 MMHG | WEIGHT: 208.8 LBS | RESPIRATION RATE: 20 BRPM

## 2022-03-03 DIAGNOSIS — Z79.01 LONG TERM (CURRENT) USE OF ANTICOAGULANTS: Primary | ICD-10-CM

## 2022-03-03 DIAGNOSIS — K08.9 POOR DENTITION: ICD-10-CM

## 2022-03-03 DIAGNOSIS — D68.59 HYPERCOAGULABLE STATE (HCC): ICD-10-CM

## 2022-03-03 LAB
INR BLD: 1.4
PT POC: 14.1 SECONDS
VALID INTERNAL CONTROL?: YES

## 2022-03-03 PROCEDURE — 85610 PROTHROMBIN TIME: CPT | Performed by: FAMILY MEDICINE

## 2022-03-03 PROCEDURE — 99213 OFFICE O/P EST LOW 20 MIN: CPT | Performed by: FAMILY MEDICINE

## 2022-03-03 RX ORDER — ENOXAPARIN SODIUM 100 MG/ML
40 INJECTION SUBCUTANEOUS DAILY
Qty: 10 EACH | Refills: 0 | Status: SHIPPED | OUTPATIENT
Start: 2022-03-03 | End: 2022-05-05 | Stop reason: ALTCHOICE

## 2022-03-03 NOTE — PROGRESS NOTES
Jessica Valera is a 73 y.o. male who presents with the following:  Chief Complaint   Patient presents with    Anticoagulation       Patient with lupus anticoagulant with hypercoagulable state is set to have his teeth extracted early next week and has stopped his warfarin and is ready to bridge with Lovenox stopping that 24 hours before the procedure and then restarting it once bleeding has been controlled along with his warfarin and then come back the following week to have his INR checked. His INR today was 1.4. Allergies   Allergen Reactions    Haldol [Haloperidol Lactate] Rash       Current Outpatient Medications   Medication Sig    enoxaparin (LOVENOX) 40 mg/0.4 mL 0.4 mL by SubCUTAneous route daily.  omeprazole (PRILOSEC) 20 mg capsule TAKE 1 CAPSULE BY MOUTH ONCE A DAY 1/2 HOUR BEFORE BREAKFAST    warfarin (COUMADIN) 3 mg tablet TAKE 1 TABLET BY MOUTH EVERY DAY INDICATIONS: DEEP VEIN THROMBOSIS PREVENTION    warfarin (COUMADIN) 2 mg tablet TAKE 1 TABLET BY MOUTH EVERY DAY    diazePAM (VALIUM) 10 mg tablet Take 1 Tablet by mouth four (4) times daily as needed for Anxiety. Max Daily Amount: 40 mg.    tamsulosin (FLOMAX) 0.4 mg capsule Take 1 Capsule by mouth daily.  mirtazapine (REMERON) 30 mg tablet Take 1 Tablet by mouth nightly.  fenofibrate (LOFIBRA) 54 mg tablet TAKE 1 TABLET BY MOUTH EVERY DAY WITH FOOD    cyclobenzaprine (FLEXERIL) 10 mg tablet Take 1 Tablet by mouth three (3) times daily as needed for Muscle Spasm(s). Indications: muscle spasm    hyoscyamine (ANASPAZ, LEVSIN) 0.125 mg tablet TAKE 1 TABLET BY MOUTH EVERY 4 HOURS AS NEEDED FOR PAIN    polyethylene glycol (MIRALAX) 17 gram/dose powder Take 17 g by mouth daily. No current facility-administered medications for this visit.        Past Medical History:   Diagnosis Date    Anxiety     BPH (benign prostatic hyperplasia)     Chronic anticoagulation     Depression     DJD (degenerative joint disease)     DVT of leg (deep venous thrombosis) ([facility]     ED (erectile dysfunction)     GERD (gastroesophageal reflux disease)     Hypercoagulable state ([facility]     lupus coag    Impaired glucose tolerance        Past Surgical History:   Procedure Laterality Date    HX ENDOSCOPY  2011    [place] ABDOMEN SURGERY PROC UNLISTED  2011    ischemic bowel d/t hypercoagulable state       Family History   Problem Relation Age of Onset    Heart Disease Father     Heart Disease Brother        Social History     Socioeconomic History    Marital status:    Tobacco Use    Smoking status: Never Smoker    Smokeless tobacco: Never Used   Substance and Sexual Activity    Alcohol use: No    Drug use: Yes     Types: Marijuana       Review of Systems   Constitutional: Negative for chills, fever, malaise/fatigue and weight loss. HENT: Negative for congestion, hearing loss, sore throat and tinnitus. Eyes: Negative for blurred vision, pain and discharge. Respiratory: Negative for cough, shortness of breath and wheezing. Cardiovascular: Negative for chest pain, palpitations, orthopnea, claudication and leg swelling. Gastrointestinal: Negative for abdominal pain, constipation and heartburn. Genitourinary: Negative for dysuria, frequency and urgency. Musculoskeletal: Negative for falls, joint pain and myalgias. Skin: Negative for itching and rash. Neurological: Negative for dizziness, tingling, tremors and headaches. Endo/Heme/Allergies: Negative for environmental allergies and polydipsia. Psychiatric/Behavioral: Negative for depression and substance abuse. The patient is not nervous/anxious. Visit Vitals  BP (!) 147/87 (BP 1 Location: Left arm)   Pulse 79   Temp 98.4 °F (36.9 °C)   Resp 20   Ht 5' 8\" (1.727 m)   Wt 208 lb 12.8 oz (94.7 kg)   SpO2 96%   BMI 31.75 kg/m²     Physical Exam  Vitals reviewed. Constitutional:       General: He is not in acute distress. Appearance: Normal appearance.  He is not ill-appearing. HENT:      Head: Normocephalic and atraumatic. Right Ear: Tympanic membrane, ear canal and external ear normal.      Left Ear: Tympanic membrane, ear canal and external ear normal.      Nose: Nose normal. No congestion or rhinorrhea. Mouth/Throat:      Mouth: Mucous membranes are moist.      Pharynx: No oropharyngeal exudate or posterior oropharyngeal erythema. Comments: Poor dentition  Eyes:      Extraocular Movements: Extraocular movements intact. Conjunctiva/sclera: Conjunctivae normal.      Pupils: Pupils are equal, round, and reactive to light. Comments: Pupil iris and anterior chamber normal.   Neck:      Trachea: No tracheal deviation. Cardiovascular:      Rate and Rhythm: Normal rate and regular rhythm. Pulses: Normal pulses. Heart sounds: Normal heart sounds. No murmur heard. No friction rub. No gallop. Pulmonary:      Effort: Pulmonary effort is normal. No respiratory distress. Breath sounds: Normal breath sounds. No wheezing, rhonchi or rales. Chest:      Chest wall: No tenderness. Abdominal:      General: Bowel sounds are normal. There is no distension. Palpations: Abdomen is soft. There is no mass. Tenderness: There is no abdominal tenderness. There is no guarding or rebound. Hernia: No hernia is present. Musculoskeletal:         General: No tenderness. Normal range of motion. Cervical back: Normal range of motion and neck supple. Right lower leg: No edema. Left lower leg: No edema. Lymphadenopathy:      Cervical: No cervical adenopathy. Skin:     General: Skin is warm and dry. Findings: No erythema or rash. Neurological:      General: No focal deficit present. Mental Status: He is alert and oriented to person, place, and time. Cranial Nerves: No cranial nerve deficit. Motor: No abnormal muscle tone. Deep Tendon Reflexes: Reflexes are normal and symmetric.  Reflexes normal. Comments: Cranial nerves II through XII intact sensory and motor. Deep tendon reflexes in the biceps triceps knee and ankle are normal and bilaterally symmetrical.   Psychiatric:         Mood and Affect: Mood normal.         Behavior: Behavior normal.         Thought Content: Thought content normal.         Judgment: Judgment normal.           ICD-10-CM ICD-9-CM    1. Long term (current) use of anticoagulants  Z79.01 V58.61 AMB POC PT/INR      enoxaparin (LOVENOX) 40 mg/0.4 mL   2. Hypercoagulable state ([facility]  D68.59 289.81 enoxaparin (LOVENOX) 40 mg/0.4 mL   3. Poor dentition  K08.9 525.9        Orders Placed This Encounter    AMB POC PT/INR    enoxaparin (LOVENOX) 40 mg/0.4 mL     Si.4 mL by SubCUTAneous route daily.      Dispense:  10 Each     Refill:  0     The patient can stop the Lovenox 24 hours before his procedure and should start it again the next day with his warfarin           Whitley Burt MD

## 2022-03-04 ENCOUNTER — TELEPHONE (OUTPATIENT)
Dept: FAMILY MEDICINE CLINIC | Age: 72
End: 2022-03-04

## 2022-03-04 NOTE — TELEPHONE ENCOUNTER
To confirm, it looks like the script sent was once daily. Please confirm, I will then notify patient.

## 2022-03-10 DIAGNOSIS — M62.838 MUSCLE SPASM: ICD-10-CM

## 2022-03-10 RX ORDER — CYCLOBENZAPRINE HCL 10 MG
TABLET ORAL
Qty: 90 TABLET | Refills: 1 | Status: SHIPPED | OUTPATIENT
Start: 2022-03-10

## 2022-03-18 PROBLEM — D68.62 LUPUS ANTICOAGULANT WITH HYPERCOAGULABLE STATE (HCC): Status: ACTIVE | Noted: 2021-07-08

## 2022-03-18 PROBLEM — K08.9 POOR DENTITION: Status: ACTIVE | Noted: 2022-03-03

## 2022-03-19 PROBLEM — E78.1 HYPERTRIGLYCERIDEMIA: Status: ACTIVE | Noted: 2021-03-26

## 2022-03-19 PROBLEM — Z86.718 HISTORY OF DVT (DEEP VEIN THROMBOSIS): Status: ACTIVE | Noted: 2021-02-26

## 2022-03-20 PROBLEM — M77.8 TENDINITIS OF RIGHT WRIST: Status: ACTIVE | Noted: 2021-12-28

## 2022-03-23 DIAGNOSIS — D68.59 HYPERCOAGULABLE STATE (HCC): ICD-10-CM

## 2022-03-23 DIAGNOSIS — N40.1 BENIGN PROSTATIC HYPERPLASIA WITH URINARY FREQUENCY: ICD-10-CM

## 2022-03-23 DIAGNOSIS — R35.0 BENIGN PROSTATIC HYPERPLASIA WITH URINARY FREQUENCY: ICD-10-CM

## 2022-03-24 RX ORDER — WARFARIN 3 MG/1
TABLET ORAL
Qty: 60 TABLET | Refills: 0 | Status: SHIPPED | OUTPATIENT
Start: 2022-03-24 | End: 2022-04-25 | Stop reason: SDUPTHER

## 2022-03-24 RX ORDER — TAMSULOSIN HYDROCHLORIDE 0.4 MG/1
CAPSULE ORAL
Qty: 90 CAPSULE | Refills: 1 | Status: SHIPPED | OUTPATIENT
Start: 2022-03-24 | End: 2022-09-06

## 2022-04-25 DIAGNOSIS — D68.59 HYPERCOAGULABLE STATE (HCC): ICD-10-CM

## 2022-04-25 RX ORDER — WARFARIN 3 MG/1
TABLET ORAL
Qty: 60 TABLET | Refills: 0 | Status: SHIPPED | OUTPATIENT
Start: 2022-04-25 | End: 2022-05-05 | Stop reason: SDUPTHER

## 2022-04-25 NOTE — TELEPHONE ENCOUNTER
Patient requesting refill on     Requested Prescriptions     Pending Prescriptions Disp Refills    warfarin (COUMADIN) 3 mg tablet 60 Tablet 0     Sig: TAKE 1 TABLET BY MOUTH EVERY DAY INDICATIONS: DEEP VEIN THROMBOSIS PREVENTION       Last OV 3/3/2022

## 2022-05-05 ENCOUNTER — OFFICE VISIT (OUTPATIENT)
Dept: FAMILY MEDICINE CLINIC | Age: 72
End: 2022-05-05
Payer: MEDICARE

## 2022-05-05 VITALS
HEART RATE: 78 BPM | SYSTOLIC BLOOD PRESSURE: 133 MMHG | WEIGHT: 207 LBS | RESPIRATION RATE: 18 BRPM | BODY MASS INDEX: 31.37 KG/M2 | TEMPERATURE: 98.1 F | OXYGEN SATURATION: 98 % | DIASTOLIC BLOOD PRESSURE: 83 MMHG | HEIGHT: 68 IN

## 2022-05-05 DIAGNOSIS — I10 ESSENTIAL HYPERTENSION: ICD-10-CM

## 2022-05-05 DIAGNOSIS — Z79.01 LONG TERM (CURRENT) USE OF ANTICOAGULANTS: Primary | ICD-10-CM

## 2022-05-05 DIAGNOSIS — D68.59 HYPERCOAGULABLE STATE (HCC): ICD-10-CM

## 2022-05-05 LAB
INR BLD: 4.1
PT POC: 45.8 SECONDS
VALID INTERNAL CONTROL?: YES

## 2022-05-05 PROCEDURE — 85610 PROTHROMBIN TIME: CPT | Performed by: FAMILY MEDICINE

## 2022-05-05 PROCEDURE — 99213 OFFICE O/P EST LOW 20 MIN: CPT | Performed by: FAMILY MEDICINE

## 2022-05-05 RX ORDER — WARFARIN 2 MG/1
TABLET ORAL
Qty: 90 TABLET | Refills: 0
Start: 2022-05-05 | End: 2022-07-12 | Stop reason: SDUPTHER

## 2022-05-05 RX ORDER — TERAZOSIN 5 MG/1
5 CAPSULE ORAL
Qty: 90 CAPSULE | Refills: 1 | Status: SHIPPED | OUTPATIENT
Start: 2022-05-05

## 2022-05-05 RX ORDER — WARFARIN 3 MG/1
TABLET ORAL
Qty: 60 TABLET | Refills: 0
Start: 2022-05-05 | End: 2022-07-12 | Stop reason: SDUPTHER

## 2022-05-05 NOTE — PROGRESS NOTES
Noman Salcedo is a 73 y.o. male who presents with the following:  Chief Complaint   Patient presents with    Blood Clot     Hypercoagulable state from lupus anticoagulant    Hypertension       Patient's current dose of warfarin is keeping his INR at 4.1 so we will reduce this to 2 mg 3 days a week and 3 mg 4 days a week and recheck it later this month. Patient's blood pressure still fluctuating up into a higher zone that we would like it and he continues to have dribbling on the current dose of tamsulosin so I will add Hytrin 5 mg nightly to see if we can take care of both of these problems. Allergies   Allergen Reactions    Haldol [Haloperidol Lactate] Rash       Current Outpatient Medications   Medication Sig    terazosin (HYTRIN) 5 mg capsule Take 1 Capsule by mouth nightly.  warfarin (COUMADIN) 3 mg tablet Take 1 tablet Tuesday Thursday Saturday Sunday    warfarin (COUMADIN) 2 mg tablet TAKE 1 TABLET BY MOUTH Monday Wednesday Friday    tamsulosin (FLOMAX) 0.4 mg capsule TAKE 1 CAPSULE BY MOUTH EVERY DAY    cyclobenzaprine (FLEXERIL) 10 mg tablet TAKE 1 TABLET BY MOUTH THREE (3) TIMES DAILY AS NEEDED FOR MUSCLE SPASMS    omeprazole (PRILOSEC) 20 mg capsule TAKE 1 CAPSULE BY MOUTH ONCE A DAY 1/2 HOUR BEFORE BREAKFAST    diazePAM (VALIUM) 10 mg tablet Take 1 Tablet by mouth four (4) times daily as needed for Anxiety. Max Daily Amount: 40 mg.    mirtazapine (REMERON) 30 mg tablet Take 1 Tablet by mouth nightly.  fenofibrate (LOFIBRA) 54 mg tablet TAKE 1 TABLET BY MOUTH EVERY DAY WITH FOOD    hyoscyamine (ANASPAZ, LEVSIN) 0.125 mg tablet TAKE 1 TABLET BY MOUTH EVERY 4 HOURS AS NEEDED FOR PAIN    polyethylene glycol (MIRALAX) 17 gram/dose powder Take 17 g by mouth daily. No current facility-administered medications for this visit.        Past Medical History:   Diagnosis Date    Anxiety     BPH (benign prostatic hyperplasia)     Chronic anticoagulation     Depression     DJD (degenerative joint disease)     DVT of leg (deep venous thrombosis) ([facility] [address]     ED (erectile dysfunction)     GERD (gastroesophageal reflux disease)     Hypercoagulable state ([facility] [address]     lupus coag    Impaired glucose tolerance        Past Surgical History:   Procedure Laterality Date    HX ENDOSCOPY  2011    DE ABDOMEN SURGERY PROC UNLISTED  2011    ischemic bowel d/t hypercoagulable state       Family History   Problem Relation Age of Onset    Heart Disease Father     Heart Disease Brother        Social History     Socioeconomic History    Marital status:    Tobacco Use    Smoking status: Never Smoker    Smokeless tobacco: Never Used   Substance and Sexual Activity    Alcohol use: No    Drug use: Yes     Types: Marijuana       Review of Systems   Constitutional: Negative for chills, fever, malaise/fatigue and weight loss. HENT: Negative for congestion, hearing loss, sore throat and tinnitus. Eyes: Negative for blurred vision, pain and discharge. Respiratory: Negative for cough, shortness of breath and wheezing. Cardiovascular: Negative for chest pain, palpitations, orthopnea, claudication and leg swelling. Gastrointestinal: Negative for abdominal pain, constipation and heartburn. Genitourinary: Positive for frequency. Negative for dysuria and urgency. Musculoskeletal: Negative for falls, joint pain and myalgias. Skin: Negative for itching and rash. Neurological: Negative for dizziness, tingling, tremors and headaches. Endo/Heme/Allergies: Negative for environmental allergies and polydipsia. Does not bruise/bleed easily. Psychiatric/Behavioral: Negative for depression and substance abuse. The patient is not nervous/anxious. Visit Vitals  /83 (BP 1 Location: Left arm)   Pulse 78   Temp 98.1 °F (36.7 °C)   Resp 18   Ht 5' 8\" (1.727 m)   Wt 207 lb (93.9 kg)   SpO2 98%   BMI 31.47 kg/m²     Physical Exam  Vitals reviewed.    Constitutional:       General: He is not in acute distress. Appearance: Normal appearance. He is not ill-appearing. HENT:      Head: Normocephalic and atraumatic. Right Ear: Tympanic membrane, ear canal and external ear normal.      Left Ear: Tympanic membrane, ear canal and external ear normal.      Nose: Nose normal. No congestion or rhinorrhea. Mouth/Throat:      Mouth: Mucous membranes are moist.      Pharynx: No oropharyngeal exudate or posterior oropharyngeal erythema. Eyes:      Extraocular Movements: Extraocular movements intact. Conjunctiva/sclera: Conjunctivae normal.      Pupils: Pupils are equal, round, and reactive to light. Comments: Pupil iris and anterior chamber normal.   Neck:      Trachea: No tracheal deviation. Cardiovascular:      Rate and Rhythm: Normal rate and regular rhythm. Pulses: Normal pulses. Heart sounds: Normal heart sounds. No murmur heard. No friction rub. No gallop. Pulmonary:      Effort: Pulmonary effort is normal. No respiratory distress. Breath sounds: Normal breath sounds. No wheezing, rhonchi or rales. Chest:      Chest wall: No tenderness. Abdominal:      General: Bowel sounds are normal. There is no distension. Palpations: Abdomen is soft. There is no mass. Tenderness: There is no abdominal tenderness. There is no guarding or rebound. Hernia: No hernia is present. Musculoskeletal:         General: No tenderness. Normal range of motion. Cervical back: Normal range of motion and neck supple. Right lower leg: No edema. Left lower leg: No edema. Lymphadenopathy:      Cervical: No cervical adenopathy. Skin:     General: Skin is warm and dry. Findings: No erythema or rash. Neurological:      General: No focal deficit present. Mental Status: He is alert and oriented to person, place, and time. Cranial Nerves: No cranial nerve deficit. Motor: No abnormal muscle tone.       Deep Tendon Reflexes: Reflexes are normal and symmetric. Reflexes normal.      Comments: Cranial nerves II through XII intact sensory and motor. Deep tendon reflexes in the biceps triceps knee and ankle are normal and bilaterally symmetrical.   Psychiatric:         Mood and Affect: Mood normal.         Behavior: Behavior normal.         Thought Content: Thought content normal.         Judgment: Judgment normal.           ICD-10-CM ICD-9-CM    1. Long term (current) use of anticoagulants  Z79.01 V58.61 COLLECTION CAPILLARY BLOOD SPECIMEN      AMB POC PT/INR   2. Essential hypertension  I10 401.9 terazosin (HYTRIN) 5 mg capsule   3. [facility] ([facility]  D68.59 289.81 warfarin (COUMADIN) 3 mg tablet      warfarin (COUMADIN) 2 mg tablet       Orders Placed This Encounter    COLLECTION CAPILLARY BLOOD SPECIMEN    AMB POC PT/INR    terazosin (HYTRIN) 5 mg capsule     Sig: Take 1 Capsule by mouth nightly. Dispense:  90 Capsule     Refill:  1    warfarin (COUMADIN) 3 mg tablet     Sig: Take 1 tablet Tuesday Thursday Saturday Sunday     Dispense:  60 Tablet     Refill:  0    warfarin (COUMADIN) 2 mg tablet     Sig: TAKE 1 TABLET BY MOUTH Monday Wednesday Friday     Dispense:  90 Tablet     Refill:  0       Follow-up and Dispositions    · Return in about 4 weeks (around 6/2/2022), or if symptoms worsen or fail to improve.          Esme Raya MD

## 2022-05-05 NOTE — PROGRESS NOTES
1. Have you been to the ER, urgent care clinic since your last visit? Hospitalized since your last visit?no    2. Have you seen or consulted any other health care providers outside of the [address] [place] [address] since your last visit? Include any pap smears or colon screening.  no

## 2022-05-17 NOTE — PROGRESS NOTES
Order for the trigger point injection the kenalog medication was administered on 12-28-21 I just documented on 12-29-21 and was not able to addend in the [facility]

## 2022-05-18 ENCOUNTER — HOSPITAL ENCOUNTER (OUTPATIENT)
Dept: BEHAVIORAL/MENTAL HEALTH | Age: 72
Discharge: HOME | End: 2022-05-18
Payer: MEDICARE

## 2022-05-18 PROCEDURE — 99214 OFFICE O/P EST MOD 30 MIN: CPT | Performed by: PSYCHIATRY & NEUROLOGY

## 2022-05-18 NOTE — PROGRESS NOTES
INTERVAL HISTORY (In Person):  Mr. Ana Lin is a 53-year-old  white male following up with me 6 months since his last appointment regarding a longstanding history of severe Generalized Anxiety Disorder as well as a remote history of Depression, for which he has been in remission for some time. He has been maintained on Diazepam for over 50 years as noted below, with reasonable control of his anxiety noted. Yesy Smith has historically been very sensitive to other medications including other benzos, and has been unable to tolerate many, many meds, but the Valium is something he has tolerated, and has utilized on a controlled basis.  Over the past several years we've also utilized Fluoxetine for the mild dysphoria he often has, and more recently we've used Mirtazapine to also help with sleep (and mood). He was stable the last time, even off the Prozac for 3 months.     He comes in today stating that he's been \"horrible\" over the past several months, mostly due to financial issues. He states he's going to have to cut down with re: to expenses including no high speed internet or computer access, no THC, etc. He states they may eventually have to sell the house and move into a trailer, but he seems to be coping with those issues at least reasonably well. No more overt or severe anxiety sx's. Still feels that the Diazepam helps greatly and he still doesn't use all his pills in the course of a month. He also still takes the Mirtazapine nightly and it helps with sleep and likely his mood. No notable N/V dysfunction reported and no med SE's either.  Seems to be less stress with his wife--she's not as paranoid lately.        CURRENT MEDICATIONS:  1.  Diazepam 10 mg qid prn--averages 3.5/day  2.  Mirtazapine 30 mg qhs prn (30 mg)--taking nightly     MENTAL STATUS EXAM:  Mr. Ana Lin was noted to be a casually dressed, adequately groomed 53-year-old who appeared his stated age. He was again fairly pleasant and cooperative, and not irritable this time at all. He still exhibits a slightly restricted affect, which is c/w his baseline.  He reported only very mild depressive symptoms now, and he denied any feelings of hopeless or suicidal thinking. There was no evidence of any breana or hypomania, nor any symptoms of psychosis.  His anxiety is still evident but reasonably well controlled, overall.  His judgment and insight appear to be fair, and his cognitive functioning appeared to be only slightly impaired.       DIAGNOSTIC IMPRESSION:                     Axis I       Generalized Anxiety Disorder, mild to moderate (F41.1)                  Major Depression, mild (F33.0)  Axis II      Deferred. Axis III     Degenerative joint disease; history of DVT; lupus; history of bowel resection; chronic pain issues.     PLAN:  1.  Continue the Diazepam at 10 mg qid prn--has 1 month remaining (30 day). 2.  Continue the Mirtazapine at 30 mg qhs prn (30 mg)--has 6 months of refills (30 day).   3.  He will follow up within the next 3 months at his request, sooner if needed.

## 2022-05-26 ENCOUNTER — OFFICE VISIT (OUTPATIENT)
Dept: FAMILY MEDICINE CLINIC | Age: 72
End: 2022-05-26
Payer: MEDICARE

## 2022-05-26 VITALS
WEIGHT: 209.8 LBS | SYSTOLIC BLOOD PRESSURE: 132 MMHG | HEIGHT: 68 IN | DIASTOLIC BLOOD PRESSURE: 80 MMHG | RESPIRATION RATE: 16 BRPM | BODY MASS INDEX: 31.8 KG/M2 | HEART RATE: 93 BPM | TEMPERATURE: 98.2 F | OXYGEN SATURATION: 95 %

## 2022-05-26 DIAGNOSIS — K08.9 POOR DENTITION: ICD-10-CM

## 2022-05-26 DIAGNOSIS — M15.9 PRIMARY OSTEOARTHRITIS INVOLVING MULTIPLE JOINTS: ICD-10-CM

## 2022-05-26 DIAGNOSIS — Z00.00 MEDICARE ANNUAL WELLNESS VISIT, SUBSEQUENT: Primary | ICD-10-CM

## 2022-05-26 DIAGNOSIS — Z79.01 LONG TERM (CURRENT) USE OF ANTICOAGULANTS: ICD-10-CM

## 2022-05-26 DIAGNOSIS — Z86.718 HISTORY OF DVT (DEEP VEIN THROMBOSIS): ICD-10-CM

## 2022-05-26 DIAGNOSIS — K21.9 GASTROESOPHAGEAL REFLUX DISEASE WITHOUT ESOPHAGITIS: ICD-10-CM

## 2022-05-26 DIAGNOSIS — N40.1 BENIGN PROSTATIC HYPERPLASIA WITH URINARY FREQUENCY: ICD-10-CM

## 2022-05-26 DIAGNOSIS — D68.59 HYPERCOAGULABLE STATE (HCC): ICD-10-CM

## 2022-05-26 DIAGNOSIS — R35.0 BENIGN PROSTATIC HYPERPLASIA WITH URINARY FREQUENCY: ICD-10-CM

## 2022-05-26 DIAGNOSIS — E78.1 HYPERTRIGLYCERIDEMIA: ICD-10-CM

## 2022-05-26 LAB
INR BLD: 1.3
PT POC: 15.1 SECONDS
VALID INTERNAL CONTROL?: YES

## 2022-05-26 PROCEDURE — 85610 PROTHROMBIN TIME: CPT | Performed by: FAMILY MEDICINE

## 2022-05-26 PROCEDURE — 99214 OFFICE O/P EST MOD 30 MIN: CPT | Performed by: FAMILY MEDICINE

## 2022-05-26 PROCEDURE — G0439 PPPS, SUBSEQ VISIT: HCPCS | Performed by: FAMILY MEDICINE

## 2022-05-26 RX ORDER — ENOXAPARIN SODIUM 100 MG/ML
40 INJECTION SUBCUTANEOUS DAILY
Qty: 10 EACH | Refills: 0 | Status: SHIPPED | OUTPATIENT
Start: 2022-05-26 | End: 2022-09-07 | Stop reason: SDUPTHER

## 2022-05-26 NOTE — PROGRESS NOTES
Chief Complaint   Patient presents with    Well Male     Medicare annual wellness visit    Coagulation disorder     This anticoagulant with hypercoagulable state with history of DVT and history of long-term use of anticoagulants    Other     Medical Clearance    Benign Prostatic Hypertrophy    GERD    Arthritis    Dental Problem     Needs extraction of his remaining teeth     1. \"Have you been to the ER, urgent care clinic since your last visit? Hospitalized since your last visit? \" No    2. \"Have you seen or consulted any other health care providers outside of the [address] [place] [address] since your last visit? \" No     3. For patients aged 39-70: Has the patient had a colonoscopy / FIT/ Cologuard? No      If the patient is female:    4. For patients aged 41-77: Has the patient had a mammogram within the past 2 years? No      5. For patients aged 21-65: Has the patient had a pap smear? No      Identified pt with two pt identifiers(name and ). Reviewed record in preparation for visit and have obtained necessary documentation. Symptom review:    NO  Fever   NO  Shaking chills  NO  Cough  NO Headaches  NO  Body aches  NO  Coughing up blood  NO  Chest congestion  NO  Chest pain  NO  Shortness of breath  NO  Profound Loss of smell/taste  NO  Nausea/Vomiting   NO  Loose stool/Diarrhea  NO  any skin issues    This is the Subsequent Medicare Annual Wellness Exam, performed 12 months or more after the Initial AWV or the last Subsequent AWV    I have reviewed the patient's medical history in detail and updated the computerized patient record.    Mary Bryant is a 73 y.o. male who presents with the following:  Chief Complaint   Patient presents with    Well Male     Medicare annual wellness visit    Coagulation disorder     This anticoagulant with hypercoagulable state with history of DVT and history of long-term use of anticoagulants    Other     Medical Clearance    Benign Prostatic Hypertrophy    GERD    Arthritis    Dental Problem     Needs extraction of his remaining teeth       Patient comes in for his annual wellness visit and medical clearance for dental procedure to have his lower teeth extracted. 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 Patient does have a history of BPH without any symptomatology and he does have an impaired glucose tolerance and he does have gastroesophageal reflux disease but generally as long as he is vertical he does fairly well and is also on omeprazole which keeps him from having heartburn. Patient also has degenerative joint disease which gives him some stiffness and achy pain but because of his anticoagulation he has to avoid NSAID's. The patient does use fenofibrate for his hypertriglyceridemia. Patient also uses tamsulosin to assist with his urination as his stream is a bit slow and can cause nocturia and disturbs sleep. Overall, the patient is okayed to proceed with his dental procedure off of his warfarin. The patient knows to stop his Lovenox on the day of his procedure. Allergies   Allergen Reactions    Haldol [Haloperidol Lactate] Rash       Current Outpatient Medications   Medication Sig    terazosin (HYTRIN) 5 mg capsule Take 1 Capsule by mouth nightly.     warfarin (COUMADIN) 3 mg tablet Take 1 tablet Tuesday Thursday Saturday Sunday    warfarin (COUMADIN) 2 mg tablet TAKE 1 TABLET BY MOUTH Monday Wednesday Friday    tamsulosin (FLOMAX) 0.4 mg capsule TAKE 1 CAPSULE BY MOUTH EVERY DAY    cyclobenzaprine (FLEXERIL) 10 mg tablet TAKE 1 TABLET BY MOUTH THREE (3) TIMES DAILY AS NEEDED FOR MUSCLE SPASMS    omeprazole (PRILOSEC) 20 mg capsule TAKE 1 CAPSULE BY MOUTH ONCE A DAY 1/2 HOUR BEFORE BREAKFAST    diazePAM (VALIUM) 10 mg tablet Take 1 Tablet by mouth four (4) times daily as needed for Anxiety. Max Daily Amount: 40 mg.    mirtazapine (REMERON) 30 mg tablet Take 1 Tablet by mouth nightly.  fenofibrate (LOFIBRA) 54 mg tablet TAKE 1 TABLET BY MOUTH EVERY DAY WITH FOOD    hyoscyamine (ANASPAZ, LEVSIN) 0.125 mg tablet TAKE 1 TABLET BY MOUTH EVERY 4 HOURS AS NEEDED FOR PAIN    polyethylene glycol (MIRALAX) 17 gram/dose powder Take 17 g by mouth daily. No current facility-administered medications for this visit. Past Medical History:   Diagnosis Date    Anxiety     BPH (benign prostatic hyperplasia)     Chronic anticoagulation     Depression     DJD (degenerative joint disease)     DVT of leg (deep venous thrombosis) ([facility])     ED (erectile dysfunction)     GERD (gastroesophageal reflux disease)     Hypercoagulable state ([facility])     lupus coag    Impaired glucose tolerance        Past Surgical History:   Procedure Laterality Date    HX ENDOSCOPY  2011    [place] ABDOMEN SURGERY PROC UNLISTED  2011    ischemic bowel d/t hypercoagulable state       Family History   Problem Relation Age of Onset    Heart Disease Father     Heart Disease Brother        Social History     Socioeconomic History    Marital status:    Tobacco Use    Smoking status: Never Smoker    Smokeless tobacco: Never Used   Vaping Use    Vaping Use: Never used   Substance and Sexual Activity    Alcohol use: No    Drug use: Yes     Types: Marijuana    Sexual activity: Yes       Review of Systems   Constitutional: Negative for chills, fever, malaise/fatigue and weight loss. HENT: Negative for congestion, hearing loss, sore throat and tinnitus.          Poor dentition   Eyes: Negative for blurred vision, pain and discharge. Respiratory: Negative for cough, shortness of breath and wheezing. Cardiovascular: Negative for chest pain, palpitations, orthopnea, claudication and leg swelling. Gastrointestinal: Negative for abdominal pain, constipation and heartburn. Genitourinary: Negative for dysuria, frequency and urgency. Musculoskeletal: Negative for falls, joint pain and myalgias. Skin: Negative for itching and rash. Neurological: Negative for dizziness, tingling, tremors and headaches. Endo/Heme/Allergies: Negative for environmental allergies and polydipsia. Does not bruise/bleed easily. Psychiatric/Behavioral: Negative for depression and substance abuse. The patient is not nervous/anxious. Visit Vitals  /80   Pulse 93   Temp 98.2 °F (36.8 °C) (Oral)   Resp 16   Ht 5' 8\" (1.727 m)   Wt 209 lb 12.8 oz (95.2 kg)   SpO2 95%   BMI 31.90 kg/m²     Physical Exam  Vitals reviewed. Constitutional:       General: He is not in acute distress. Appearance: Normal appearance. He is obese. He is not ill-appearing. HENT:      Head: Normocephalic and atraumatic. Comments: Poor dentition. Right Ear: Tympanic membrane, ear canal and external ear normal.      Left Ear: Tympanic membrane, ear canal and external ear normal.      Nose: Nose normal. No congestion or rhinorrhea. Mouth/Throat:      Mouth: Mucous membranes are moist.      Pharynx: No oropharyngeal exudate or posterior oropharyngeal erythema. Eyes:      Extraocular Movements: Extraocular movements intact. Conjunctiva/sclera: Conjunctivae normal.      Pupils: Pupils are equal, round, and reactive to light. Comments: Pupil iris and anterior chamber normal.   Neck:      Vascular: No carotid bruit. Trachea: No tracheal deviation. Cardiovascular:      Rate and Rhythm: Normal rate and regular rhythm. Pulses: Normal pulses. Heart sounds: Normal heart sounds. No murmur heard.   No friction rub. No gallop. Pulmonary:      Effort: Pulmonary effort is normal. No respiratory distress. Breath sounds: Normal breath sounds. No wheezing, rhonchi or rales. Chest:      Chest wall: No tenderness. Abdominal:      General: Bowel sounds are normal. There is no distension. Palpations: Abdomen is soft. There is no mass. Tenderness: There is no abdominal tenderness. There is no guarding or rebound. Hernia: No hernia is present. Musculoskeletal:         General: No tenderness. Normal range of motion. Cervical back: Normal range of motion and neck supple. Right lower leg: No edema. Left lower leg: No edema. Lymphadenopathy:      Cervical: No cervical adenopathy. Skin:     General: Skin is warm and dry. Findings: No erythema or rash. Neurological:      General: No focal deficit present. Mental Status: He is alert and oriented to person, place, and time. Cranial Nerves: No cranial nerve deficit. Sensory: No sensory deficit. Motor: No weakness or abnormal muscle tone. Coordination: Coordination normal.      Gait: Gait normal.      Deep Tendon Reflexes: Reflexes are normal and symmetric. Reflexes normal.      Comments: Cranial nerves II through XII intact sensory and motor. Deep tendon reflexes in the biceps triceps knee and ankle are normal and bilaterally symmetrical.   Psychiatric:         Mood and Affect: Mood normal.         Behavior: Behavior normal.         Thought Content: Thought content normal.         Judgment: Judgment normal.           ICD-10-CM ICD-9-CM    1. Long term (current) use of anticoagulants  Z79.01 V58.61 COLLECTION CAPILLARY BLOOD SPECIMEN      AMB POC PT/INR   2. Medicare annual wellness visit, subsequent  Z00.00 V70.0    3. Poor dentition  K08.9 525.9    4. History of DVT (deep vein thrombosis)  Z86.718 V12.51    5. Hypertriglyceridemia  E78.1 272.1    6.  Benign prostatic hyperplasia with urinary frequency N40.1 600.01     R35.0 788.41    7. Primary osteoarthritis involving multiple joints  M15.9 715.98    8. Gastroesophageal reflux disease without esophagitis  K21.9 530.81        Orders Placed This Encounter    COLLECTION CAPILLARY BLOOD SPECIMEN    AMB POC PT/INR       Follow-up and Dispositions    · Return in about 6 months (around 11/26/2022), or if symptoms worsen or fail to improve. Yoon Mina MD            Assessment/Plan   Education and counseling provided:  Are appropriate based on today's review and evaluation    1. Long term (current) use of anticoagulants  -     COLLECTION CAPILLARY BLOOD SPECIMEN  -     AMB POC PT/INR  2. Medicare annual wellness visit, subsequent  3. Poor dentition  4. History of DVT (deep vein thrombosis)  5. Hypertriglyceridemia  6. Benign prostatic hyperplasia with urinary frequency  7. Primary osteoarthritis involving multiple joints  8. Gastroesophageal reflux disease without esophagitis       Depression Risk Factor Screening     3 most recent PHQ Screens 5/26/2022   Little interest or pleasure in doing things Not at all   Feeling down, depressed, irritable, or hopeless Not at all   Total Score PHQ 2 0       Alcohol & Drug Abuse Risk Screen    Do you average more than 1 drink per night or more than 7 drinks a week: No    In the past three months have you have had more than 4 drinks containing alcohol on one occasion: No          Functional Ability and Level of Safety    Hearing: The patient needs further evaluation. Activities of Daily Living: The home contains: no safety equipment. Patient does total self care      Ambulation: with no difficulty     Fall Risk:  Fall Risk Assessment, last 12 mths 5/26/2022   Able to walk? Yes   Fall in past 12 months? 0   Do you feel unsteady?  0   Are you worried about falling 0      Abuse Screen:  Patient is not abused       Cognitive Screening    Has your family/caregiver stated any concerns about your memory: no Cognitive Screening: Normal - Clock Drawing Test, Remembering three words    Health Maintenance Due     Health Maintenance Due   Topic Date Due    DTaP/Tdap/Td series (1 - Tdap) Never done    Colorectal Cancer Screening Combo  Never done    Shingrix Vaccine Age 50> (1 of 2) Never done    Pneumococcal 65+ years (1 - PCV) Never done    COVID-19 Vaccine (3 - Booster for Moderna series) 01/07/2022       Patient Care Team   Patient Care Team:  Mark Forbes MD as PCP - General (Family Medicine)  Mark Forbes MD as PCP [place] [facility]an[place]    History     Patient Active Problem List   Diagnosis Code    Long term (current) use of anticoagulants Z79.01    Depression F32. A    Impaired glucose tolerance R73.02    DJD (degenerative joint disease) M19.90    GERD (gastroesophageal reflux disease) K21.9    Anxiety F41.9    BPH (benign prostatic hyperplasia) N40.0    History of DVT (deep vein thrombosis) Z86.718    Hypertriglyceridemia E78.1    Lupus anticoagulant with hypercoagulable state ([facility] [address] D68.62    Tendinitis of right wrist M77.8    Poor dentition K08.9     Past Medical History:   Diagnosis Date    Anxiety     BPH (benign prostatic hyperplasia)     Chronic anticoagulation     Depression     DJD (degenerative joint disease)     DVT of leg (deep venous thrombosis) ([facility])     ED (erectile dysfunction)     GERD (gastroesophageal reflux disease)     Hypercoagulable state ([facility])     lupus coag    Impaired glucose tolerance       Past Surgical History:   Procedure Laterality Date    HX ENDOSCOPY  2011    [place] ABDOMEN SURGERY PROC UNLISTED  2011    ischemic bowel d/t hypercoagulable state     Current Outpatient Medications   Medication Sig Dispense Refill    terazosin (HYTRIN) 5 mg capsule Take 1 Capsule by mouth nightly.  90 Capsule 1    warfarin (COUMADIN) 3 mg tablet Take 1 tablet Tuesday Thursday Saturday Sunday 60 Tablet 0    warfarin (COUMADIN) 2 mg tablet TAKE 1 TABLET BY MOUTH Monday Wednesday Friday 90 Tablet 0    tamsulosin (FLOMAX) 0.4 mg capsule TAKE 1 CAPSULE BY MOUTH EVERY DAY 90 Capsule 1    cyclobenzaprine (FLEXERIL) 10 mg tablet TAKE 1 TABLET BY MOUTH THREE (3) TIMES DAILY AS NEEDED FOR MUSCLE SPASMS 90 Tablet 1    omeprazole (PRILOSEC) 20 mg capsule TAKE 1 CAPSULE BY MOUTH ONCE A DAY 1/2 HOUR BEFORE BREAKFAST 90 Capsule 1    diazePAM (VALIUM) 10 mg tablet Take 1 Tablet by mouth four (4) times daily as needed for Anxiety. Max Daily Amount: 40 mg. 120 Tablet 5    mirtazapine (REMERON) 30 mg tablet Take 1 Tablet by mouth nightly. 90 Tablet 3    fenofibrate (LOFIBRA) 54 mg tablet TAKE 1 TABLET BY MOUTH EVERY DAY WITH FOOD 90 Tablet 1    hyoscyamine (ANASPAZ, LEVSIN) 0.125 mg tablet TAKE 1 TABLET BY MOUTH EVERY 4 HOURS AS NEEDED FOR PAIN      polyethylene glycol (MIRALAX) 17 gram/dose powder Take 17 g by mouth daily.  527 g 3     Allergies   Allergen Reactions    Haldol [Haloperidol Lactate] Rash       Family History   Problem Relation Age of Onset    Heart Disease Father     Heart Disease Brother      Social History     Tobacco Use    Smoking status: Never Smoker    Smokeless tobacco: Never Used   Substance Use Topics    Alcohol use: No         Newton JAZMYNE García

## 2022-05-26 NOTE — PATIENT INSTRUCTIONS
Medicare Wellness Visit, Male    The best way to live healthy is to have a lifestyle where you eat a well-balanced diet, exercise regularly, limit alcohol use, and quit all forms of tobacco/nicotine, if applicable. Regular preventive services are another way to keep healthy. Preventive services (vaccines, screening tests, monitoring & exams) can help personalize your care plan, which helps you manage your own care. Screening tests can find health problems at the earliest stages, when they are easiest to treat. [facility] follows the current, evidence-based guidelines published by the [facility] Ra[facility]el Rob[facility] ([facility]) when recommending preventive services for our patients. Because we follow these guidelines, sometimes recommendations change over time as research supports it. (For example, a prostate screening blood test is no longer routinely recommended for men with no symptoms). Of course, you and your doctor may decide to screen more often for some diseases, based on your risk and co-morbidities (chronic disease you are already diagnosed with). Preventive services for you include:  - Medicare offers their members a free annual wellness visit, which is time for you and your primary care provider to discuss and plan for your preventive service needs. Take advantage of this benefit every year!  -All adults over age 72 should receive the recommended pneumonia vaccines. Current USPSTF guidelines recommend a series of two vaccines for the best pneumonia protection.   -All adults should have a flu vaccine yearly and tetanus vaccine every 10 years.  -All adults age 48 and older should receive the shingles vaccines (series of two vaccines).        -All adults age 38-68 who are overweight should have a diabetes screening test once every three years.   -Other screening tests & preventive services for persons with diabetes include: an eye exam to screen for diabetic retinopathy, a kidney function test, a foot exam, and stricter control over your cholesterol.   -Cardiovascular screening for adults with routine risk involves an electrocardiogram (ECG) at intervals determined by the provider.   -Colorectal cancer screening should be done for adults age 54-65 with no increased risk factors for colorectal cancer. There are a number of acceptable methods of screening for this type of cancer. Each test has its own benefits and drawbacks. Discuss with your provider what is most appropriate for you during your annual wellness visit. The different tests include: colonoscopy (considered the best screening method), a fecal occult blood test, a fecal DNA test, and sigmoidoscopy.  -All adults born between [facility][1] should be screened once for Hepatitis C.  -An Abdominal Aortic Aneurysm (AAA) Screening is recommended for men age 73-68 who has ever smoked in their lifetime.      Here is a list of your current Health Maintenance items (your personalized list of preventive services) with a due date:  Health Maintenance Due   Topic Date Due    DTaP/Tdap/Td  (1 - Tdap) Never done    Colorectal Screening  Never done    Shingles Vaccine (1 of 2) Never done    Pneumococcal Vaccine (1 - PCV) Never done    COVID-19 Vaccine (3 - Booster for Moderna series) 01/07/2022

## 2022-06-11 DIAGNOSIS — E78.1 HYPERTRIGLYCERIDEMIA: ICD-10-CM

## 2022-06-11 DIAGNOSIS — Z13.220 LIPID SCREENING: ICD-10-CM

## 2022-06-14 RX ORDER — FENOFIBRATE 54 MG/1
TABLET ORAL
Qty: 90 TABLET | Refills: 1 | Status: SHIPPED | OUTPATIENT
Start: 2022-06-14 | End: 2022-09-22

## 2022-07-11 DIAGNOSIS — F41.1 GENERALIZED ANXIETY DISORDER: ICD-10-CM

## 2022-07-11 RX ORDER — DIAZEPAM 10 MG/1
10 TABLET ORAL
Qty: 120 TABLET | Refills: 5 | Status: SHIPPED | OUTPATIENT
Start: 2022-07-11

## 2022-07-12 DIAGNOSIS — D68.59 HYPERCOAGULABLE STATE (HCC): ICD-10-CM

## 2022-07-12 RX ORDER — WARFARIN 2 MG/1
TABLET ORAL
Qty: 90 TABLET | Refills: 0 | Status: SHIPPED | OUTPATIENT
Start: 2022-07-12 | End: 2022-09-22 | Stop reason: SDUPTHER

## 2022-07-12 RX ORDER — WARFARIN 3 MG/1
TABLET ORAL
Qty: 60 TABLET | Refills: 0 | Status: SHIPPED | OUTPATIENT
Start: 2022-07-12 | End: 2022-09-06

## 2022-08-03 RX ORDER — OMEPRAZOLE 20 MG/1
CAPSULE, DELAYED RELEASE ORAL
Qty: 90 CAPSULE | Refills: 1 | Status: SHIPPED | OUTPATIENT
Start: 2022-08-03 | End: 2022-09-22

## 2022-08-04 ENCOUNTER — NURSE TRIAGE (OUTPATIENT)
Dept: OTHER | Facility: CLINIC | Age: 72
End: 2022-08-04

## 2022-08-04 ENCOUNTER — TELEPHONE (OUTPATIENT)
Dept: FAMILY MEDICINE CLINIC | Age: 72
End: 2022-08-04

## 2022-08-04 NOTE — TELEPHONE ENCOUNTER
Received call from SUSIE at [facility] with Red Flag Complaint. Subjective: Caller states \"Have had shoulder pain for 2 weeks\"     Current Symptoms: Left wrist (main pain), thumb, elbow and shoulder pain  Discoloration in the forearm  Can bend elbow  Pain is getting better over the past 2 weeks  Some slight bleeding of the gums    Onset: 2 weeks ago    Pain Severity: 1/10, comes and goes    Temperature: Denies fever, chills and sweats     What has been tried: Nothing    History related to today's reason for call: Superficial pain thrombosis, on Warfarin, Lupus anticoagulant with hypercoagulable state ([facility] [address]    Recommended disposition: See PCP within 24 Hours    Care advice provided, patient verbalizes understanding; denies any other questions or concerns; instructed to call back for any new or worsening symptoms. Patient/Caller agrees with recommended disposition; writer provided warm transfer to Phani Nava at [facility] for appointment scheduling    Attention Provider: Thank you for allowing me to participate in the care of your patient. The patient was connected to triage in response to information provided to the E[facility]. Please do not respond through this encounter as the response is not directed to a shared pool.       Reason for Disposition   Patient wants to be seen   Taking Coumadin (warfarin) or other strong blood thinner, or known bleeding disorder (e.g., thrombocytopenia)    Protocols used: Arm Pain-ADULT-[facility], Bruises-ADULT-[facility]

## 2022-08-04 NOTE — TELEPHONE ENCOUNTER
Patient called complaining of left arm pain was advised to go to the ER said he did not feel like he needed to was transferred to front for appt

## 2022-08-08 ENCOUNTER — HOSPITAL ENCOUNTER (EMERGENCY)
Age: 72
Discharge: HOME | End: 2022-08-08
Attending: EMERGENCY MEDICINE
Payer: MEDICARE

## 2022-08-08 VITALS
SYSTOLIC BLOOD PRESSURE: 158 MMHG | HEART RATE: 82 BPM | RESPIRATION RATE: 18 BRPM | TEMPERATURE: 97.9 F | OXYGEN SATURATION: 100 % | DIASTOLIC BLOOD PRESSURE: 94 MMHG

## 2022-08-08 DIAGNOSIS — S56.912A STRAIN OF LEFT FOREARM, INITIAL ENCOUNTER: Primary | ICD-10-CM

## 2022-08-08 LAB
INR PPP: 3.1 (ref 0.9–1.1)
PROTHROMBIN TIME: 29.7 SEC (ref 9–11.1)

## 2022-08-08 PROCEDURE — 36415 COLL VENOUS BLD VENIPUNCTURE: CPT

## 2022-08-08 PROCEDURE — 99283 EMERGENCY DEPT VISIT LOW MDM: CPT

## 2022-08-08 PROCEDURE — 85610 PROTHROMBIN TIME: CPT

## 2022-08-08 RX ORDER — TRAMADOL HYDROCHLORIDE 50 MG/1
50 TABLET ORAL
Qty: 12 TABLET | Refills: 0 | Status: SHIPPED | OUTPATIENT
Start: 2022-08-08 | End: 2022-08-11

## 2022-08-08 NOTE — ED PROVIDER NOTES
EMERGENCY DEPARTMENT HISTORY AND PHYSICAL EXAM          Date: 8/8/2022  Patient Name: Mendel Caulk    History of Presenting Illness     Chief Complaint   Patient presents with    Arm Pain       History Provided By: Patient    HPI: Mendel Caulk is a 73 y.o. male, pmhx listed below, who presents to the ED c/o left forearm pain. Worse with wrist range of motion. Reports he has a history of right forearm pain and was told at the time he had superficial thrombophlebitis. Has not noted any swelling or redness of left forearm. No treatments or evaluations for symptoms prior to arrival.  No recent overuse injuries. States he takes Coumadin regularly but has not had his INR checked recently. States when he had a superficial thrombophlebitis in the past, pain got better with tramadol. PCP: Mary Anne Islas MD    There are no other complaints, changes, or physical findings at this time.          Past History       Past Medical History:  Past Medical History:   Diagnosis Date    Anxiety     BPH (benign prostatic hyperplasia)     Chronic anticoagulation     Depression     DJD (degenerative joint disease)     DVT of leg (deep venous thrombosis) ([facility])     ED (erectile dysfunction)     GERD (gastroesophageal reflux disease)     Hypercoagulable state (HCC)     lupus coag    Impaired glucose tolerance        Past Surgical History:  Past Surgical History:   Procedure Laterality Date    HX ENDOSCOPY  2011    [place] ABDOMEN SURGERY PROC UNLISTED  2011    ischemic bowel d/t hypercoagulable state       Family History:  Family History   Problem Relation Age of Onset    Heart Disease Father     Heart Disease Brother        Social History:  Social History     Tobacco Use    Smoking status: Never    Smokeless tobacco: Never   Vaping Use    Vaping Use: Never used   Substance Use Topics    Alcohol use: No    Drug use: Yes     Types: Marijuana       Current Outpatient Medications   Medication Sig Dispense Refill    traMADoL (Ultram) 50 mg tablet Take 1 Tablet by mouth every six (6) hours as needed for Pain for up to 3 days. Max Daily Amount: 200 mg. 12 Tablet 0    omeprazole (PRILOSEC) 20 mg capsule TAKE 1 CAPSULE BY MOUTH ONCE A DAY 1/2 HOUR BEFORE BREAKFAST 90 Capsule 1    warfarin (COUMADIN) 3 mg tablet Take 1 tablet Tuesday Thursday Saturday Sunday 60 Tablet 0    warfarin (COUMADIN) 2 mg tablet TAKE 1 TABLET BY MOUTH Monday Wednesday Friday 90 Tablet 0    diazePAM (VALIUM) 10 mg tablet Take 1 Tablet by mouth four (4) times daily as needed for Anxiety. Max Daily Amount: 40 mg. 120 Tablet 5    fenofibrate (LOFIBRA) 54 mg tablet TAKE 1 TABLET BY MOUTH EVERY DAY WITH FOOD 90 Tablet 1    enoxaparin (LOVENOX) 40 mg/0.4 mL 0.4 mL by SubCUTAneous route daily. 10 Each 0    terazosin (HYTRIN) 5 mg capsule Take 1 Capsule by mouth nightly. 90 Capsule 1    tamsulosin (FLOMAX) 0.4 mg capsule TAKE 1 CAPSULE BY MOUTH EVERY DAY 90 Capsule 1    cyclobenzaprine (FLEXERIL) 10 mg tablet TAKE 1 TABLET BY MOUTH THREE (3) TIMES DAILY AS NEEDED FOR MUSCLE SPASMS 90 Tablet 1    mirtazapine (REMERON) 30 mg tablet Take 1 Tablet by mouth nightly. 90 Tablet 3    hyoscyamine (ANASPAZ, LEVSIN) 0.125 mg tablet TAKE 1 TABLET BY MOUTH EVERY 4 HOURS AS NEEDED FOR PAIN      polyethylene glycol (MIRALAX) 17 gram/dose powder Take 17 g by mouth daily. 527 g 3       Allergies: Allergies   Allergen Reactions    Haldol [Haloperidol Lactate] Rash         Review of Systems   Review of Systems   Constitutional:  Negative for chills and fever. HENT:  Negative for ear pain. Eyes:  Negative for pain. Respiratory:  Negative for shortness of breath. Cardiovascular:  Negative for chest pain. Gastrointestinal:  Negative for abdominal pain. Genitourinary:  Negative for flank pain. Musculoskeletal:  Negative for back pain. Skin:  Negative for rash. Neurological:  Negative for headaches. Psychiatric/Behavioral:  Negative for agitation.       Physical Exam     Vital Signs-Reviewed the patient's vital signs. Patient Vitals for the past 12 hrs:   Temp Pulse Resp BP SpO2   08/08/22 1840 -- 82 18 (!) 158/94 100 %   08/08/22 1738 97.9 °F (36.6 °C) 84 18 (!) 161/101 95 %       Physical Exam  Vitals reviewed. HENT:      Head: Normocephalic and atraumatic. Mouth/Throat:      Mouth: Mucous membranes are moist.   Cardiovascular:      Rate and Rhythm: Normal rate. Pulmonary:      Effort: Pulmonary effort is normal.   Musculoskeletal:         General: Normal range of motion. Cervical back: Normal range of motion. Comments: Normal appearance of bilateral forearms with no swelling, engorgement of veins, erythema. Joints nontender. No adenopathy palpable in axilla or elbow. Normal radial pulses bilaterally. Skin:     General: Skin is warm and dry. Neurological:      Mental Status: He is alert and oriented to person, place, and time. Psychiatric:         Mood and Affect: Mood normal.       Diagnostic Study Results     Labs -     Recent Results (from the past 12 hour(s))   PROTHROMBIN TIME + INR    Collection Time: 08/08/22  5:55 PM   Result Value Ref Range    INR 3.1 (H) 0.9 - 1.1      Prothrombin time 29.7 (H) 9.0 - 11.1 sec       Radiologic Studies -   No orders to display     CT Results  (Last 48 hours)      None          CXR Results  (Last 48 hours)      None              Medical Decision Making   I am the first provider for this patient. I reviewed the vital signs, available nursing notes, past medical history, past surgical history, family history and social history. Records Reviewed: Nursing Notes and Old Medical Records    Provider Notes (Medical Decision Making):   MDM: 74-year-old male with left upper extremity pain. No swelling or skin changes to suggest cellulitis, upper extremity DVT, acute thrombophlebitis. Most likely is muscle sprain/strain. Will check INR now. Initial assessment performed.  The patients presenting problems have been discussed, and they are in agreement with the care plan formulated and outlined with them. I have encouraged them to ask questions as they arise throughout their visit. PROGRESS NOTE:  INR is therapeutic. Will discharge home with short course of tramadol and recommend PCP follow-up. Discharge note:  Pt re-evaluated and noted to be feeling better, ready for discharge. Updated pt on all final results. Will follow up as instructed. All questions have been answered, pt voiced understanding and agreement with plan. Specific return precautions provided as well as instructions to return to the ED should sx worsen at any time. Vital signs stable for discharge. Diagnosis     Clinical Impression:   1. Strain of left forearm, initial encounter            Disposition:  Discharged    Discharge Medication List as of 8/8/2022  6:32 PM            Please note, this dictation was completed with [employer], the computer voice recognition software. Quite often unanticipated grammatical, syntax, homophones, and other interpretive errors are inadvertently transcribed by the computer software. Please disregard these errors. Please excuse any errors that have escaped final proof reading.

## 2022-09-05 DIAGNOSIS — D68.59 HYPERCOAGULABLE STATE (HCC): ICD-10-CM

## 2022-09-06 RX ORDER — WARFARIN 3 MG/1
TABLET ORAL
Qty: 60 TABLET | Refills: 0 | Status: SHIPPED | OUTPATIENT
Start: 2022-09-06 | End: 2022-09-22 | Stop reason: SDUPTHER

## 2022-09-07 ENCOUNTER — OFFICE VISIT (OUTPATIENT)
Dept: FAMILY MEDICINE CLINIC | Age: 72
End: 2022-09-07
Payer: MEDICARE

## 2022-09-07 VITALS
SYSTOLIC BLOOD PRESSURE: 133 MMHG | TEMPERATURE: 97.6 F | WEIGHT: 211 LBS | BODY MASS INDEX: 31.98 KG/M2 | DIASTOLIC BLOOD PRESSURE: 71 MMHG | OXYGEN SATURATION: 98 % | HEIGHT: 68 IN | RESPIRATION RATE: 18 BRPM | HEART RATE: 72 BPM

## 2022-09-07 DIAGNOSIS — Z79.01 LONG TERM (CURRENT) USE OF ANTICOAGULANTS: Primary | ICD-10-CM

## 2022-09-07 DIAGNOSIS — D68.59 HYPERCOAGULABLE STATE (HCC): ICD-10-CM

## 2022-09-07 LAB
HGB BLD-MCNC: 22.8 G/DL
INR BLD: 1.9
PT POC: 22.8 SECONDS
VALID INTERNAL CONTROL?: YES

## 2022-09-07 PROCEDURE — 85610 PROTHROMBIN TIME: CPT | Performed by: NURSE PRACTITIONER

## 2022-09-07 PROCEDURE — 99213 OFFICE O/P EST LOW 20 MIN: CPT | Performed by: NURSE PRACTITIONER

## 2022-09-07 PROCEDURE — 85018 HEMOGLOBIN: CPT | Performed by: NURSE PRACTITIONER

## 2022-09-07 RX ORDER — ENOXAPARIN SODIUM 100 MG/ML
40 INJECTION SUBCUTANEOUS DAILY
Qty: 10 EACH | Refills: 0 | Status: SHIPPED | OUTPATIENT
Start: 2022-09-07 | End: 2022-09-22 | Stop reason: ALTCHOICE

## 2022-09-07 NOTE — PROGRESS NOTES
1. \"Have you been to the ER, urgent care clinic since your last visit? Hospitalized since your last visit? \" ER RG     2. \"Have you seen or consulted any other health care providers outside of the [address] [place] [address] since your last visit? \" No     3. For patients aged 39-70: Has the patient had a colonoscopy / FIT/ Cologuard? No      If the patient is female:    4. For patients aged 41-77: Has the patient had a mammogram within the past 2 years? NA - based on age or sex      11. For patients aged 21-65: Has the patient had a pap smear? NA - based on age or sex    Chief Complaint   Patient presents with    Anticoagulation     Having dental procedure     Visit Vitals  /71 (BP 1 Location: Left upper arm, BP Patient Position: Sitting)   Pulse 72   Temp 97.6 °F (36.4 °C) (Temporal)   Resp 18   Ht 5' 8\" (1.727 m)   Wt 211 lb (95.7 kg)   SpO2 98%   BMI 32.08 kg/m²     INR and POC HGB completed in left hand without difficulty per Leo Martinez NP's order.

## 2022-09-07 NOTE — PROGRESS NOTES
Subjective:     CC: anticoagulation    Carmelita Lora is a 73 y.o. male who presents today to follow up for chronic anticoagulation. He takes Coumadin for hx of DVT back in 2011. He stopped it 2 days ago because he has an upcoming dental procedure scheduled on 9-12-22. He will be having 6 teeth extracted. He needs a lovenox bridge. INR today is 1.9. He denies any s/s of abnormal bleeding. HGB at goal, 16. Patient Active Problem List   Diagnosis Code    Long term (current) use of anticoagulants Z79.01    Depression F32. A    Impaired glucose tolerance R73.02    DJD (degenerative joint disease) M19.90    GERD (gastroesophageal reflux disease) K21.9    Anxiety F41.9    BPH (benign prostatic hyperplasia) N40.0    History of DVT (deep vein thrombosis) Z86.718    Hypertriglyceridemia E78.1    Lupus anticoagulant with hypercoagulable state (HCC) D68.62    Tendinitis of right wrist M77.8    Poor dentition K08.9       Past Medical History:   Diagnosis Date    Anxiety     BPH (benign prostatic hyperplasia)     Chronic anticoagulation     Depression     DJD (degenerative joint disease)     DVT of leg (deep venous thrombosis) (HCC)     ED (erectile dysfunction)     GERD (gastroesophageal reflux disease)     Hypercoagulable state (HCC)     lupus coag    Impaired glucose tolerance          Current Outpatient Medications:     tamsulosin (FLOMAX) 0.4 mg capsule, TAKE 1 CAPSULE BY MOUTH EVERY DAY, Disp: 90 Capsule, Rfl: 0    warfarin (COUMADIN) 3 mg tablet, TAKE 1 TABLET TUESDAY THURSDAY SATURDAY SUNDAY, Disp: 60 Tablet, Rfl: 0    omeprazole (PRILOSEC) 20 mg capsule, TAKE 1 CAPSULE BY MOUTH ONCE A DAY 1/2 HOUR BEFORE BREAKFAST, Disp: 90 Capsule, Rfl: 1    warfarin (COUMADIN) 2 mg tablet, TAKE 1 TABLET BY MOUTH Monday Wednesday Friday, Disp: 90 Tablet, Rfl: 0    diazePAM (VALIUM) 10 mg tablet, Take 1 Tablet by mouth four (4) times daily as needed for Anxiety.  Max Daily Amount: 40 mg., Disp: 120 Tablet, Rfl: 5    fenofibrate (LOFIBRA) 54 mg tablet, TAKE 1 TABLET BY MOUTH EVERY DAY WITH FOOD, Disp: 90 Tablet, Rfl: 1    enoxaparin (LOVENOX) 40 mg/0.4 mL, 0.4 mL by SubCUTAneous route daily. , Disp: 10 Each, Rfl: 0    terazosin (HYTRIN) 5 mg capsule, Take 1 Capsule by mouth nightly., Disp: 90 Capsule, Rfl: 1    cyclobenzaprine (FLEXERIL) 10 mg tablet, TAKE 1 TABLET BY MOUTH THREE (3) TIMES DAILY AS NEEDED FOR MUSCLE SPASMS, Disp: 90 Tablet, Rfl: 1    mirtazapine (REMERON) 30 mg tablet, Take 1 Tablet by mouth nightly., Disp: 90 Tablet, Rfl: 3    hyoscyamine (ANASPAZ, LEVSIN) 0.125 mg tablet, TAKE 1 TABLET BY MOUTH EVERY 4 HOURS AS NEEDED FOR PAIN, Disp: , Rfl:     polyethylene glycol (MIRALAX) 17 gram/dose powder, Take 17 g by mouth daily. , Disp: 527 g, Rfl: 3    Allergies   Allergen Reactions    Haldol [Haloperidol Lactate] Rash       Past Surgical History:   Procedure Laterality Date    HX ENDOSCOPY  2011    [place] ABDOMEN SURGERY PROC UNLISTED  2011    ischemic bowel d/t hypercoagulable state       Social History     Tobacco Use   Smoking Status Never   Smokeless Tobacco Never       Social History     Socioeconomic History    Marital status:    Tobacco Use    Smoking status: Never    Smokeless tobacco: Never   Vaping Use    Vaping Use: Never used   Substance and Sexual Activity    Alcohol use: No    Drug use: Yes     Types: Marijuana    Sexual activity: Yes       Family History   Problem Relation Age of Onset    Heart Disease Father     Heart Disease Brother        ROS:  Gen: denies fever, chills, or fatigue  HEENT:denies H/A, nasal congestion, or sore throat  Resp: denies dyspnea, cough, or wheezing  CV: denies chest pain, pressure, or palpitations  Extremeties: denies edema,  GI[de-identified] denies hematochezia or melena  : denies hematuria  Neuro: denies numbness/tingling or dizziness  Skin: denies rashes or new lesions     Objective:     Visit Vitals  /71 (BP 1 Location: Left upper arm, BP Patient Position: Sitting)   Pulse 72   Temp 97.6 °F (36.4 °C) (Temporal)   Resp 18   Ht 5' 8\" (1.727 m)   Wt 211 lb (95.7 kg)   SpO2 98%   BMI 32.08 kg/m²       General: Alert and oriented. No acute distress. Well nourished. HEENT :  Eyes: Sclera white, conjunctiva clear. PERRLA. Extra ocular movements intact. Neck: Supple with FROM. Lungs: Breathing even and unlabored. All lobes clear to auscultation bilaterally   Heart :RRR, S1 and S2 normal intensity, no extra heart sounds  Extremities: Non-edematous. Neuro: Cranial nerves grossly normal.  Psych: Mood and thought content appropriate for situation. Dressed appropriately and with good hygiene. Skin: Warm, dry, and intact. No lesions or discoloration. Assessment/ Plan:     Chronic anticoagulation  INR today 1.9- he stopped Coumadin 2 days ago for upcoming dental procedure scheduled for 9-12-22. He will start Lovenox 40mg SQ daily today. He will hold Lovenox 24 hours prior to procedure and day of procedure. He will restart Lovenox the day after his procedure and restart his Coumdin the following day  Advised to follow up 1 week after procedure to recheck INR        Verbal and written instructions (see AVS) provided. Patient expresses understanding of diagnosis and treatment plan. Health Maintenance Due   Topic Date Due    DTaP/Tdap/Td series (1 - Tdap) Never done    Colorectal Cancer Screening Combo  Never done    Shingrix Vaccine Age 50> (1 of 2) Never done    Pneumococcal 65+ years (1 - PCV) Never done    COVID-19 Vaccine (3 - Booster for Moderna series) 01/07/2022    Flu Vaccine (1) Never done               Simone Luna, KAUSHAL

## 2022-09-08 ENCOUNTER — TELEPHONE (OUTPATIENT)
Dept: FAMILY MEDICINE CLINIC | Age: 72
End: 2022-09-08

## 2022-09-08 DIAGNOSIS — N40.1 BENIGN PROSTATIC HYPERPLASIA WITH URINARY FREQUENCY: ICD-10-CM

## 2022-09-08 DIAGNOSIS — R35.0 BENIGN PROSTATIC HYPERPLASIA WITH URINARY FREQUENCY: ICD-10-CM

## 2022-09-08 RX ORDER — TAMSULOSIN HYDROCHLORIDE 0.4 MG/1
0.8 CAPSULE ORAL DAILY
Qty: 180 CAPSULE | Refills: 1 | Status: SHIPPED | OUTPATIENT
Start: 2022-09-08

## 2022-09-08 NOTE — TELEPHONE ENCOUNTER
Pt states his dosage of Tamsulosin HCL 0.4 MG Cap has increased to 2 caps per day.  If so please resend to [facility].

## 2022-09-22 ENCOUNTER — OFFICE VISIT (OUTPATIENT)
Dept: FAMILY MEDICINE CLINIC | Age: 72
End: 2022-09-22
Payer: MEDICARE

## 2022-09-22 VITALS
RESPIRATION RATE: 20 BRPM | DIASTOLIC BLOOD PRESSURE: 85 MMHG | HEIGHT: 68 IN | WEIGHT: 211 LBS | TEMPERATURE: 98.1 F | BODY MASS INDEX: 31.98 KG/M2 | SYSTOLIC BLOOD PRESSURE: 126 MMHG | OXYGEN SATURATION: 98 % | HEART RATE: 65 BPM

## 2022-09-22 DIAGNOSIS — Z79.01 LONG TERM (CURRENT) USE OF ANTICOAGULANTS: Primary | ICD-10-CM

## 2022-09-22 DIAGNOSIS — D68.59 HYPERCOAGULABLE STATE (HCC): ICD-10-CM

## 2022-09-22 LAB
INR BLD: 1.4
PT POC: 16.2 SECONDS
VALID INTERNAL CONTROL?: YES

## 2022-09-22 PROCEDURE — 1101F PT FALLS ASSESS-DOCD LE1/YR: CPT | Performed by: NURSE PRACTITIONER

## 2022-09-22 PROCEDURE — 99213 OFFICE O/P EST LOW 20 MIN: CPT | Performed by: NURSE PRACTITIONER

## 2022-09-22 PROCEDURE — 85610 PROTHROMBIN TIME: CPT | Performed by: NURSE PRACTITIONER

## 2022-09-22 PROCEDURE — G8752 SYS BP LESS 140: HCPCS | Performed by: NURSE PRACTITIONER

## 2022-09-22 PROCEDURE — G9717 DOC PT DX DEP/BP F/U NT REQ: HCPCS | Performed by: NURSE PRACTITIONER

## 2022-09-22 PROCEDURE — G8427 DOCREV CUR MEDS BY ELIG CLIN: HCPCS | Performed by: NURSE PRACTITIONER

## 2022-09-22 PROCEDURE — G8417 CALC BMI ABV UP PARAM F/U: HCPCS | Performed by: NURSE PRACTITIONER

## 2022-09-22 PROCEDURE — 1123F ACP DISCUSS/DSCN MKR DOCD: CPT | Performed by: NURSE PRACTITIONER

## 2022-09-22 PROCEDURE — G8754 DIAS BP LESS 90: HCPCS | Performed by: NURSE PRACTITIONER

## 2022-09-22 PROCEDURE — G8536 NO DOC ELDER MAL SCRN: HCPCS | Performed by: NURSE PRACTITIONER

## 2022-09-22 PROCEDURE — 3017F COLORECTAL CA SCREEN DOC REV: CPT | Performed by: NURSE PRACTITIONER

## 2022-09-22 RX ORDER — WARFARIN 2 MG/1
TABLET ORAL
Qty: 90 TABLET | Refills: 0
Start: 2022-09-22 | End: 2022-10-07 | Stop reason: SDUPTHER

## 2022-09-22 RX ORDER — WARFARIN 3 MG/1
TABLET ORAL
Qty: 60 TABLET | Refills: 0
Start: 2022-09-22 | End: 2022-10-07 | Stop reason: SDUPTHER

## 2022-09-22 NOTE — PROGRESS NOTES
Subjective:     CC: anticoagulation    Delicia Branham is a 73 y.o. male who presents today to follow up for chronic anticoagulation. He takes Coumadin for hx of DVT back in 2011. He stopped it a few days prior to a dental procedure that was scheduled on 9-12-22. He used Lovenox injection the day before and the day after the procedure. He then restarted his Coumadin at 3mg daily ecept Tues and Sat he takes 2mg daily. Denies any s/s of abnormal bleeding. INR today is subtherapeutic at 1.4. Patient Active Problem List   Diagnosis Code    Long term (current) use of anticoagulants Z79.01    Depression F32. A    Impaired glucose tolerance R73.02    DJD (degenerative joint disease) M19.90    GERD (gastroesophageal reflux disease) K21.9    Anxiety F41.9    BPH (benign prostatic hyperplasia) N40.0    History of DVT (deep vein thrombosis) Z86.718    Hypertriglyceridemia E78.1    [facility] antico[facility]co[facility] D68.62    Tendinitis of right wrist M77.8    Poor dentition K08.9       Past Medical History:   Diagnosis Date    Anxiety     BPH (benign prostatic hyperplasia)     Chronic anticoagulation     Depression     DJD (degenerative joint disease)     DVT of leg (deep venous thrombosis) (HCC)     ED (erectile dysfunction)     GERD (gastroesophageal reflux disease)     Hypercoagulable state (HCC)     lupus coag    Impaired glucose tolerance          Current Outpatient Medications:     tamsulosin (Flomax) 0.4 mg capsule, Take 2 Capsules by mouth daily. , Disp: 180 Capsule, Rfl: 1    warfarin (COUMADIN) 3 mg tablet, TAKE 1 TABLET TUESDAY THURSDAY SATURDAY SUNDAY, Disp: 60 Tablet, Rfl: 0    warfarin (COUMADIN) 2 mg tablet, TAKE 1 TABLET BY MOUTH Monday Wednesday Friday, Disp: 90 Tablet, Rfl: 0    diazePAM (VALIUM) 10 mg tablet, Take 1 Tablet by mouth four (4) times daily as needed for Anxiety.  Max Daily Amount: 40 mg., Disp: 120 Tablet, Rfl: 5    terazosin (HYTRIN) 5 mg capsule, Take 1 Capsule by mouth nightly., Disp: 90 Capsule, Rfl: 1    cyclobenzaprine (FLEXERIL) 10 mg tablet, TAKE 1 TABLET BY MOUTH THREE (3) TIMES DAILY AS NEEDED FOR MUSCLE SPASMS, Disp: 90 Tablet, Rfl: 1    mirtazapine (REMERON) 30 mg tablet, Take 1 Tablet by mouth nightly., Disp: 90 Tablet, Rfl: 3    hyoscyamine (ANASPAZ, LEVSIN) 0.125 mg tablet, TAKE 1 TABLET BY MOUTH EVERY 4 HOURS AS NEEDED FOR PAIN, Disp: , Rfl:     polyethylene glycol (MIRALAX) 17 gram/dose powder, Take 17 g by mouth daily. , Disp: 527 g, Rfl: 3    enoxaparin (LOVENOX) 40 mg/0.4 mL, 0.4 mL by SubCUTAneous route daily.  (Patient not taking: Reported on 9/22/2022), Disp: 10 Each, Rfl: 0    omeprazole (PRILOSEC) 20 mg capsule, TAKE 1 CAPSULE BY MOUTH ONCE A DAY 1/2 HOUR BEFORE BREAKFAST (Patient not taking: Reported on 9/22/2022), Disp: 90 Capsule, Rfl: 1    fenofibrate (LOFIBRA) 54 mg tablet, TAKE 1 TABLET BY MOUTH EVERY DAY WITH FOOD (Patient not taking: No sig reported), Disp: 90 Tablet, Rfl: 1    Allergies   Allergen Reactions    Haldol [Haloperidol Lactate] Rash       Past Surgical History:   Procedure Laterality Date    HX ENDOSCOPY  2011    [place] ABDOMEN SURGERY PROC UNLISTED  2011    ischemic bowel d/t hypercoagulable state       Social History     Tobacco Use   Smoking Status Never   Smokeless Tobacco Never       Social History     Socioeconomic History    Marital status:    Tobacco Use    Smoking status: Never    Smokeless tobacco: Never   Vaping Use    Vaping Use: Never used   Substance and Sexual Activity    Alcohol use: No    Drug use: Yes     Types: Marijuana    Sexual activity: Yes       Family History   Problem Relation Age of Onset    Heart Disease Father     Heart Disease Brother        ROS:  Gen: denies fever, chills, or fatigue  HEENT:denies H/A, nasal congestion, or sore throat  Resp: denies dyspnea, cough, or wheezing  CV: denies chest pain, pressure, or palpitations  Extremeties: denies edema,  GI[de-identified] denies hematochezia or melena  : denies hematuria  Neuro: denies numbness/tingling or dizziness  Skin: denies rashes or new lesions     Objective:     Visit Vitals  /85 (BP 1 Location: Left arm)   Pulse 65   Temp 98.1 °F (36.7 °C)   Resp 20   Ht 5' 8\" (1.727 m)   Wt 211 lb (95.7 kg)   SpO2 98%   BMI 32.08 kg/m²       General: Alert and oriented. No acute distress. Well nourished. HEENT :  Eyes: Sclera white, conjunctiva clear. PERRLA. Extra ocular movements intact. Neck: Supple with FROM. Lungs: Breathing even and unlabored. All lobes clear to auscultation bilaterally   Heart :RRR, S1 and S2 normal intensity, no extra heart sounds  Extremities: Non-edematous. Neuro: Cranial nerves grossly normal.  Psych: Mood and thought content appropriate for situation. Dressed appropriately and with good hygiene. Skin: Warm, dry, and intact. No lesions or discoloration. Assessment/ Plan:     Chronic anticoagulation  INR today subtherapeutic at 1.4   Increase Coumadin to 4mg tonight and tomorrow night   Then resume original dose of 3mg daily except 2mg on Tues and Sat  F/U 2 weeks to recheck INR        Verbal and written instructions (see AVS) provided. Patient expresses understanding of diagnosis and treatment plan. Health Maintenance Due   Topic Date Due    DTaP/Tdap/Td series (1 - Tdap) Never done    Colorectal Cancer Screening Combo  Never done    Shingrix Vaccine Age 50> (1 of 2) Never done    Pneumococcal 65+ years (1 - PCV) Never done    COVID-19 Vaccine (3 - Booster for Moderna series) 01/07/2022    Flu Vaccine (1) Never done               Tonna Rhine D. Shira Bence, NP

## 2022-10-07 ENCOUNTER — OFFICE VISIT (OUTPATIENT)
Dept: FAMILY MEDICINE CLINIC | Age: 72
End: 2022-10-07
Payer: MEDICARE

## 2022-10-07 VITALS
HEIGHT: 68 IN | OXYGEN SATURATION: 98 % | RESPIRATION RATE: 18 BRPM | HEART RATE: 57 BPM | TEMPERATURE: 98.1 F | SYSTOLIC BLOOD PRESSURE: 121 MMHG | WEIGHT: 211 LBS | DIASTOLIC BLOOD PRESSURE: 68 MMHG | BODY MASS INDEX: 31.98 KG/M2

## 2022-10-07 DIAGNOSIS — S81.812A NONINFECTED SKIN TEAR OF LEFT LOWER EXTREMITY, INITIAL ENCOUNTER: ICD-10-CM

## 2022-10-07 DIAGNOSIS — R79.1 ELEVATED INR: ICD-10-CM

## 2022-10-07 DIAGNOSIS — D68.59 HYPERCOAGULABLE STATE (HCC): ICD-10-CM

## 2022-10-07 DIAGNOSIS — Z79.01 CHRONIC ANTICOAGULATION: Primary | ICD-10-CM

## 2022-10-07 LAB
INR BLD: 3.5
PT POC: 41.6 SECONDS
VALID INTERNAL CONTROL?: YES

## 2022-10-07 PROCEDURE — 85610 PROTHROMBIN TIME: CPT | Performed by: NURSE PRACTITIONER

## 2022-10-07 PROCEDURE — 99214 OFFICE O/P EST MOD 30 MIN: CPT | Performed by: NURSE PRACTITIONER

## 2022-10-07 RX ORDER — WARFARIN 3 MG/1
TABLET ORAL
Qty: 60 TABLET | Refills: 0
Start: 2022-10-07

## 2022-10-07 RX ORDER — WARFARIN 2 MG/1
TABLET ORAL
Qty: 30 TABLET | Refills: 0
Start: 2022-10-07

## 2022-10-07 NOTE — PROGRESS NOTES
Subjective:     CC: anticoagulation    Gary Mandujano is a 73 y.o. male who presents today to follow up for chronic anticoagulation. He takes Coumadin for hx of DVT back in 2011. He stopped it a couple of weeks ago prior to a dental procedure that was scheduled on 9-12-22. He used Lovenox injection the day before and the day after the procedure. He then restarted his Coumadin at 3mg daily except Tues and Sat he takes 2mg daily. Denies any s/s of abnormal bleeding. INR 2 weeks ago was subtherapeutic at 1.4. He was advised to increase Coumadin to 4mg daily x 2 days then resume original dose of 3mg daily except 2mg on Tues and Sat. Today INR is elevated at 3.5 and he states this is probably because he decided to take 3mg every day. Will try taking 2mg ONCE a week and F/U 2 weeks. He is interested in testing from home. Will order [address] [employer] home monitoring kit. He mentions he scraped his left shin a week ago. Has been putting peroxide on it. It's a little red but not very tender. No drainage. Patient Active Problem List   Diagnosis Code    Long term (current) use of anticoagulants Z79.01    Depression F32. A    Impaired glucose tolerance R73.02    DJD (degenerative joint disease) M19.90    GERD (gastroesophageal reflux disease) K21.9    Anxiety F41.9    BPH (benign prostatic hyperplasia) N40.0    History of DVT (deep vein thrombosis) Z86.718    Hypertriglyceridemia E78.1    Lupus anticoagulant with hypercoagulable state ([facility] [address] D68.62    Tendinitis of right wrist M77.8    Poor dentition K08.9       Past Medical History:   Diagnosis Date    Anxiety     BPH (benign prostatic hyperplasia)     Chronic anticoagulation     Depression     DJD (degenerative joint disease)     DVT of leg (deep venous thrombosis) (HCC)     ED (erectile dysfunction)     GERD (gastroesophageal reflux disease)     Hypercoagulable state (HCC)     lupus coag    Impaired glucose tolerance          Current Outpatient Medications:     warfarin (COUMADIN) 3 mg tablet, Take 3mg daily except for Tues and Sat take 2mg daily, Disp: 60 Tablet, Rfl: 0    warfarin (COUMADIN) 2 mg tablet, TAKE 1 TABLET BY MOUTH TUES AND SAT, Disp: 90 Tablet, Rfl: 0    tamsulosin (Flomax) 0.4 mg capsule, Take 2 Capsules by mouth daily. , Disp: 180 Capsule, Rfl: 1    diazePAM (VALIUM) 10 mg tablet, Take 1 Tablet by mouth four (4) times daily as needed for Anxiety. Max Daily Amount: 40 mg., Disp: 120 Tablet, Rfl: 5    terazosin (HYTRIN) 5 mg capsule, Take 1 Capsule by mouth nightly., Disp: 90 Capsule, Rfl: 1    cyclobenzaprine (FLEXERIL) 10 mg tablet, TAKE 1 TABLET BY MOUTH THREE (3) TIMES DAILY AS NEEDED FOR MUSCLE SPASMS, Disp: 90 Tablet, Rfl: 1    mirtazapine (REMERON) 30 mg tablet, Take 1 Tablet by mouth nightly., Disp: 90 Tablet, Rfl: 3    hyoscyamine (ANASPAZ, LEVSIN) 0.125 mg tablet, TAKE 1 TABLET BY MOUTH EVERY 4 HOURS AS NEEDED FOR PAIN, Disp: , Rfl:     polyethylene glycol (MIRALAX) 17 gram/dose powder, Take 17 g by mouth daily. , Disp: 527 g, Rfl: 3    Allergies   Allergen Reactions    Haldol [Haloperidol Lactate] Rash       Past Surgical History:   Procedure Laterality Date    HX ENDOSCOPY  2011    DC ABDOMEN SURGERY PROC UNLISTED  2011    ischemic bowel d/t hypercoagulable state       Social History     Tobacco Use   Smoking Status Never   Smokeless Tobacco Never       Social History     Socioeconomic History    Marital status:    Tobacco Use    Smoking status: Never    Smokeless tobacco: Never   Vaping Use    Vaping Use: Never used   Substance and Sexual Activity    Alcohol use: No    Drug use: Yes     Types: Marijuana    Sexual activity: Yes       Family History   Problem Relation Age of Onset    Heart Disease Father     Heart Disease Brother        ROS:  Gen: denies fever, chills, or fatigue  HEENT:denies H/A, nasal congestion, or sore throat  Resp: denies dyspnea, cough, or wheezing  CV: denies chest pain, pressure, or palpitations  Extremeties: denies edema,  GI[de-identified] denies hematochezia or melena  : denies hematuria  Neuro: denies numbness/tingling or dizziness  Skin: +skin tear to left shin     Objective:     Visit Vitals  /68 (BP 1 Location: Left arm)   Pulse (!) 57   Temp 98.1 °F (36.7 °C)   Resp 18   Ht 5' 8\" (1.727 m)   Wt 211 lb (95.7 kg)   SpO2 98%   BMI 32.08 kg/m²       General: Alert and oriented. No acute distress. Well nourished. HEENT :  Eyes: Sclera white, conjunctiva clear. PERRLA. Extra ocular movements intact. Neck: Supple with FROM. Lungs: Breathing even and unlabored. All lobes clear to auscultation bilaterally   Heart :RRR, S1 and S2 normal intensity, no extra heart sounds  Extremities: Non-edematous. Neuro: Cranial nerves grossly normal.  Psych: Mood and thought content appropriate for situation. Dressed appropriately and with good hygiene. Skin: Warm and dry, +small scab to left shin with surrounding erythema, no heat or edema. No drainage. Assessment/ Plan:     Chronic anticoagulation  INR today elevated at 3.4   He has been taking 3mg daily but was supposed to be taking 2mg BIW  He thought 2mg BIW would drop it too low but he is ok with taking it once a week and 3mg all other days  [facility] [employer] [facility]- will order today  Notify provider or go to ER for s/s of abnormal bleeding  F/U 2 weeks to recheck INR if he has not yet rec'd his home testing kit    Skin tear  Advised to stop using hydrogen peroxide  Apply vaseline or OTC antibiotic cream daily  Monitor redness, if it starts to spread, notify provider  F/U 2 weeks          Verbal and written instructions (see AVS) provided. Patient expresses understanding of diagnosis and treatment plan.     Health Maintenance Due   Topic Date Due    DTaP/Tdap/Td series (1 - Tdap) Never done    Colorectal Cancer Screening Combo  Never done    Shingrix Vaccine Age 50> (1 of 2) Never done    Pneumococcal 65+ years (1 - PCV) Never done    COVID-19 Vaccine (3 - Booster for Moderna series) 01/07/2022    Flu Vaccine (1) Never done               Juan Daniel Rigginse Squibb, NP

## 2022-10-21 ENCOUNTER — OFFICE VISIT (OUTPATIENT)
Dept: FAMILY MEDICINE CLINIC | Age: 72
End: 2022-10-21
Payer: MEDICARE

## 2022-10-21 VITALS
DIASTOLIC BLOOD PRESSURE: 79 MMHG | HEART RATE: 70 BPM | OXYGEN SATURATION: 97 % | TEMPERATURE: 97 F | BODY MASS INDEX: 32.08 KG/M2 | RESPIRATION RATE: 18 BRPM | HEIGHT: 68 IN | SYSTOLIC BLOOD PRESSURE: 131 MMHG

## 2022-10-21 DIAGNOSIS — Z79.01 LONG TERM (CURRENT) USE OF ANTICOAGULANTS: Primary | ICD-10-CM

## 2022-10-21 LAB
INR BLD: 3 (ref 2–3)
PT POC: 35.6 SECONDS (ref 10.4–14)
VALID INTERNAL CONTROL?: YES

## 2022-10-21 PROCEDURE — G8752 SYS BP LESS 140: HCPCS | Performed by: NURSE PRACTITIONER

## 2022-10-21 PROCEDURE — G8536 NO DOC ELDER MAL SCRN: HCPCS | Performed by: NURSE PRACTITIONER

## 2022-10-21 PROCEDURE — G8427 DOCREV CUR MEDS BY ELIG CLIN: HCPCS | Performed by: NURSE PRACTITIONER

## 2022-10-21 PROCEDURE — 3017F COLORECTAL CA SCREEN DOC REV: CPT | Performed by: NURSE PRACTITIONER

## 2022-10-21 PROCEDURE — G8754 DIAS BP LESS 90: HCPCS | Performed by: NURSE PRACTITIONER

## 2022-10-21 PROCEDURE — 1101F PT FALLS ASSESS-DOCD LE1/YR: CPT | Performed by: NURSE PRACTITIONER

## 2022-10-21 PROCEDURE — 1123F ACP DISCUSS/DSCN MKR DOCD: CPT | Performed by: NURSE PRACTITIONER

## 2022-10-21 PROCEDURE — G8417 CALC BMI ABV UP PARAM F/U: HCPCS | Performed by: NURSE PRACTITIONER

## 2022-10-21 PROCEDURE — 85610 PROTHROMBIN TIME: CPT | Performed by: NURSE PRACTITIONER

## 2022-10-21 PROCEDURE — G9717 DOC PT DX DEP/BP F/U NT REQ: HCPCS | Performed by: NURSE PRACTITIONER

## 2022-10-21 PROCEDURE — 99213 OFFICE O/P EST LOW 20 MIN: CPT | Performed by: NURSE PRACTITIONER

## 2022-10-25 ENCOUNTER — HOSPITAL ENCOUNTER (EMERGENCY)
Age: 72
Discharge: HOME | End: 2022-10-26
Attending: EMERGENCY MEDICINE
Payer: MEDICARE

## 2022-10-25 ENCOUNTER — APPOINTMENT (OUTPATIENT)
Dept: CT IMAGING | Age: 72
End: 2022-10-25
Attending: EMERGENCY MEDICINE
Payer: MEDICARE

## 2022-10-25 DIAGNOSIS — K62.5 RECTAL BLEEDING: Primary | ICD-10-CM

## 2022-10-25 LAB
ABO + RH BLD: NORMAL
ALBUMIN SERPL-MCNC: 3.7 G/DL (ref 3.5–5)
ALBUMIN/GLOB SERPL: 1.1 {RATIO} (ref 1.1–2.2)
ALP SERPL-CCNC: 56 U/L (ref 45–117)
ALT SERPL-CCNC: 36 U/L (ref 12–78)
ANION GAP SERPL CALC-SCNC: 6 MMOL/L (ref 5–15)
AST SERPL-CCNC: 26 U/L (ref 15–37)
BASOPHILS # BLD: 0 K/UL (ref 0–0.1)
BASOPHILS NFR BLD: 0 % (ref 0–1)
BILIRUB SERPL-MCNC: 0.6 MG/DL (ref 0.2–1)
BLOOD GROUP ANTIBODIES SERPL: NORMAL
BUN SERPL-MCNC: 12 MG/DL (ref 6–20)
BUN/CREAT SERPL: 10 (ref 12–20)
CALCIUM SERPL-MCNC: 8.8 MG/DL (ref 8.5–10.1)
CHLORIDE SERPL-SCNC: 106 MMOL/L (ref 97–108)
CO2 SERPL-SCNC: 33 MMOL/L (ref 21–32)
CREAT SERPL-MCNC: 1.15 MG/DL (ref 0.7–1.3)
DIFFERENTIAL METHOD BLD: ABNORMAL
EOSINOPHIL # BLD: 0.1 K/UL (ref 0–0.4)
EOSINOPHIL NFR BLD: 1 % (ref 0–7)
ERYTHROCYTE [DISTWIDTH] IN BLOOD BY AUTOMATED COUNT: 12.6 % (ref 11.5–14.5)
GLOBULIN SER CALC-MCNC: 3.3 G/DL (ref 2–4)
GLUCOSE SERPL-MCNC: 91 MG/DL (ref 65–100)
HCT VFR BLD AUTO: 42.9 % (ref 36.6–50.3)
HGB BLD-MCNC: 15.4 G/DL (ref 12.1–17)
IMM GRANULOCYTES # BLD AUTO: 0 K/UL (ref 0–0.04)
IMM GRANULOCYTES NFR BLD AUTO: 0 % (ref 0–0.5)
INR PPP: 2.3 (ref 0.9–1.1)
LYMPHOCYTES # BLD: 2.7 K/UL (ref 0.8–3.5)
LYMPHOCYTES NFR BLD: 33 % (ref 12–49)
MCH RBC QN AUTO: 30.9 PG (ref 26–34)
MCHC RBC AUTO-ENTMCNC: 35.9 G/DL (ref 30–36.5)
MCV RBC AUTO: 86 FL (ref 80–99)
MONOCYTES # BLD: 0.3 K/UL (ref 0–1)
MONOCYTES NFR BLD: 4 % (ref 5–13)
NEUTS SEG # BLD: 5.2 K/UL (ref 1.8–8)
NEUTS SEG NFR BLD: 62 % (ref 32–75)
NRBC # BLD: 0 K/UL (ref 0–0.01)
NRBC BLD-RTO: 0 PER 100 WBC
PLATELET # BLD AUTO: 192 K/UL (ref 150–400)
PLATELET COMMENTS,PCOM: ABNORMAL
PMV BLD AUTO: 9.8 FL (ref 8.9–12.9)
POTASSIUM SERPL-SCNC: 3.9 MMOL/L (ref 3.5–5.1)
PROT SERPL-MCNC: 7 G/DL (ref 6.4–8.2)
PROTHROMBIN TIME: 21.9 SEC (ref 9–11.1)
RBC # BLD AUTO: 4.99 M/UL (ref 4.1–5.7)
RBC MORPH BLD: ABNORMAL
SODIUM SERPL-SCNC: 145 MMOL/L (ref 136–145)
SPECIMEN EXP DATE BLD: NORMAL
WBC # BLD AUTO: 8.3 K/UL (ref 4.1–11.1)

## 2022-10-25 PROCEDURE — 86900 BLOOD TYPING SEROLOGIC ABO: CPT

## 2022-10-25 PROCEDURE — 74011000636 HC RX REV CODE- 636: Performed by: EMERGENCY MEDICINE

## 2022-10-25 PROCEDURE — 74177 CT ABD & PELVIS W/CONTRAST: CPT

## 2022-10-25 PROCEDURE — 99285 EMERGENCY DEPT VISIT HI MDM: CPT

## 2022-10-25 PROCEDURE — 80053 COMPREHEN METABOLIC PANEL: CPT

## 2022-10-25 PROCEDURE — 36415 COLL VENOUS BLD VENIPUNCTURE: CPT

## 2022-10-25 PROCEDURE — 85025 COMPLETE CBC W/AUTO DIFF WBC: CPT

## 2022-10-25 PROCEDURE — 74178 CT ABD&PLV WO CNTR FLWD CNTR: CPT

## 2022-10-25 PROCEDURE — 85610 PROTHROMBIN TIME: CPT

## 2022-10-25 RX ORDER — SODIUM CHLORIDE 0.9 % (FLUSH) 0.9 %
5-10 SYRINGE (ML) INJECTION ONCE
Status: DISCONTINUED | OUTPATIENT
Start: 2022-10-25 | End: 2022-10-26 | Stop reason: HOSPADM

## 2022-10-26 VITALS
OXYGEN SATURATION: 99 % | SYSTOLIC BLOOD PRESSURE: 143 MMHG | HEART RATE: 73 BPM | BODY MASS INDEX: 31.67 KG/M2 | RESPIRATION RATE: 16 BRPM | DIASTOLIC BLOOD PRESSURE: 79 MMHG | WEIGHT: 209 LBS | TEMPERATURE: 98.3 F | HEIGHT: 68 IN

## 2022-10-26 PROCEDURE — 74011000636 HC RX REV CODE- 636: Performed by: EMERGENCY MEDICINE

## 2022-10-26 RX ADMIN — IOPAMIDOL 100 ML: 612 INJECTION, SOLUTION INTRAVENOUS at 00:06

## 2022-10-26 NOTE — ED TRIAGE NOTES
2101 - pt states he started w/ 4 brown black red like formed stools that started at 1400 - 1730. Pt reports that he is concerned because in 2011 he had a spell of diverticulitis and a portion of his colon was removed. Pt states he has minimal abdominal pain (1/10). Pt states he did not call his PCP.

## 2022-10-26 NOTE — ED PROVIDER NOTES
EMERGENCY DEPARTMENT HISTORY AND PHYSICAL EXAM      Date: 10/25/2022  Patient Name: Chantelle Garcia    History of Presenting Illness     Chief Complaint   Patient presents with    Rectal Bleeding       History Provided By: Patient    HPI: Chantelle Garcia, 73 y.o. male with PMHx significant for lupus anticoagulant on coumadin, prior ischemic small bowel s/p resection, diverticulosis, IBS, who presents with a cc of rectal bleeding. Pt states that today he has had 4 BMs which is unusual. The first two contained BRB. Pt reports this is not unusual due to his diverticulosis. He then had two more BMs that were not grossly bloody but appeared dark when he pulled them out of the toilet with the toilet brush to examine them. Last INR 3.5 last wk. Pt reports occasional, lower abd cramping, but states this is also common with his IBS. No severe abd pain, n/v/cp/sob/fever. PCP: Erica Howell MD    There are no other associated signs or symptoms. No other exacerbating or alleviating factors. There are no other complaints, changes, or physical findings at this time. Current Facility-Administered Medications   Medication Dose Route Frequency Provider Last Rate Last Admin    sodium chloride (NS) flush 5-10 mL  5-10 mL IntraVENous ONCE Irish Perez MD         Current Outpatient Medications   Medication Sig Dispense Refill    warfarin (COUMADIN) 3 mg tablet Take 3mg daily except once a week take 2mg daily 60 Tablet 0    warfarin (COUMADIN) 2 mg tablet TAKE 1 TABLET BY MOUTH ONCE A WEEK 30 Tablet 0    tamsulosin (Flomax) 0.4 mg capsule Take 2 Capsules by mouth daily. 180 Capsule 1    diazePAM (VALIUM) 10 mg tablet Take 1 Tablet by mouth four (4) times daily as needed for Anxiety. Max Daily Amount: 40 mg. 120 Tablet 5    terazosin (HYTRIN) 5 mg capsule Take 1 Capsule by mouth nightly.  90 Capsule 1    cyclobenzaprine (FLEXERIL) 10 mg tablet TAKE 1 TABLET BY MOUTH THREE (3) TIMES DAILY AS NEEDED FOR MUSCLE SPASMS 90 Tablet 1    mirtazapine (REMERON) 30 mg tablet Take 1 Tablet by mouth nightly. 90 Tablet 3    hyoscyamine (ANASPAZ, LEVSIN) 0.125 mg tablet TAKE 1 TABLET BY MOUTH EVERY 4 HOURS AS NEEDED FOR PAIN      polyethylene glycol (MIRALAX) 17 gram/dose powder Take 17 g by mouth daily. (Patient not taking: Reported on 10/25/2022) 527 g 3     Past History     Past Medical History:  Past Medical History:   Diagnosis Date    Anxiety     BPH (benign prostatic hyperplasia)     Chronic anticoagulation     Depression     DJD (degenerative joint disease)     DVT of leg (deep venous thrombosis) (HCC)     ED (erectile dysfunction)     GERD (gastroesophageal reflux disease)     Hypercoagulable state ([address]     lupus coag    Impaired glucose tolerance      Past Surgical History:  Past Surgical History:   Procedure Laterality Date    HX ENDOSCOPY  2011    SC ABDOMEN SURGERY PROC UNLISTED  2011    ischemic bowel d/t hypercoagulable state     Family History:  Family History   Problem Relation Age of Onset    Heart Disease Father     Heart Disease Brother      Social History:  Social History     Tobacco Use    Smoking status: Never    Smokeless tobacco: Never   Vaping Use    Vaping Use: Never used   Substance Use Topics    Alcohol use: No    Drug use: Yes     Types: Marijuana     Allergies: Allergies   Allergen Reactions    Haldol [Haloperidol Lactate] Rash     Review of Systems   Review of Systems   Constitutional:  Negative for chills and fever. HENT:  Negative for congestion, rhinorrhea and sore throat. Respiratory:  Negative for cough and shortness of breath. Cardiovascular:  Negative for chest pain. Gastrointestinal:  Positive for abdominal pain and blood in stool. Negative for nausea and vomiting. Genitourinary:  Negative for dysuria and urgency. Skin:  Negative for rash. Neurological:  Negative for dizziness, light-headedness and headaches. All other systems reviewed and are negative.   Physical Exam Physical Exam  Vitals and nursing note reviewed. Constitutional:       General: He is not in acute distress. Appearance: He is well-developed. HENT:      Head: Normocephalic and atraumatic. Eyes:      Conjunctiva/sclera: Conjunctivae normal.      Pupils: Pupils are equal, round, and reactive to light. Cardiovascular:      Rate and Rhythm: Normal rate and regular rhythm. Pulmonary:      Effort: Pulmonary effort is normal. No respiratory distress. Breath sounds: Normal breath sounds. No stridor. Abdominal:      General: There is no distension. Palpations: Abdomen is soft. Tenderness: There is no abdominal tenderness. Musculoskeletal:         General: Normal range of motion. Cervical back: Normal range of motion. Skin:     General: Skin is warm and dry. Neurological:      Mental Status: He is alert and oriented to person, place, and time. Psychiatric:         Mood and Affect: Mood normal.         Thought Content: Thought content normal.     Diagnostic Study Results   Labs -     Recent Results (from the past 12 hour(s))   CBC WITH AUTOMATED DIFF    Collection Time: 10/25/22 10:17 PM   Result Value Ref Range    WBC 8.3 4.1 - 11.1 K/uL    RBC 4.99 4. 10 - 5.70 M/uL    HGB 15.4 12.1 - 17.0 g/dL    HCT 42.9 36.6 - 50.3 %    MCV 86.0 80.0 - 99.0 FL    MCH 30.9 26.0 - 34.0 PG    MCHC 35.9 30.0 - 36.5 g/dL    RDW 12.6 11.5 - 14.5 %    PLATELET 529 809 - 588 K/uL    MPV 9.8 8.9 - 12.9 FL    NRBC 0.0 0  WBC    ABSOLUTE NRBC 0.00 0.00 - 0.01 K/uL    NEUTROPHILS 62 32 - 75 %    LYMPHOCYTES 33 12 - 49 %    MONOCYTES 4 (L) 5 - 13 %    EOSINOPHILS 1 0 - 7 %    BASOPHILS 0 0 - 1 %    IMMATURE GRANULOCYTES 0 0.0 - 0.5 %    ABS. NEUTROPHILS 5.2 1.8 - 8.0 K/UL    ABS. LYMPHOCYTES 2.7 0.8 - 3.5 K/UL    ABS. MONOCYTES 0.3 0.0 - 1.0 K/UL    ABS. EOSINOPHILS 0.1 0.0 - 0.4 K/UL    ABS. BASOPHILS 0.0 0.0 - 0.1 K/UL    ABS. IMM.  GRANS. 0.0 0.00 - 0.04 K/UL    DF MANUAL      PLATELET COMMENTS ADEQUATE PLATELETS      RBC COMMENTS ANISOCYTOSIS  1+       METABOLIC PANEL, COMPREHENSIVE    Collection Time: 10/25/22 10:17 PM   Result Value Ref Range    Sodium 145 136 - 145 mmol/L    Potassium 3.9 3.5 - 5.1 mmol/L    Chloride 106 97 - 108 mmol/L    CO2 33 (H) 21 - 32 mmol/L    Anion gap 6 5 - 15 mmol/L    Glucose 91 65 - 100 mg/dL    BUN 12 6 - 20 MG/DL    Creatinine 1.15 0.70 - 1.30 MG/DL    BUN/Creatinine ratio 10 (L) 12 - 20      eGFR >60 >60 ml/min/1.73m2    Calcium 8.8 8.5 - 10.1 MG/DL    Bilirubin, total 0.6 0.2 - 1.0 MG/DL    ALT (SGPT) 36 12 - 78 U/L    AST (SGOT) 26 15 - 37 U/L    Alk. phosphatase 56 45 - 117 U/L    Protein, total 7.0 6.4 - 8.2 g/dL    Albumin 3.7 3.5 - 5.0 g/dL    Globulin 3.3 2.0 - 4.0 g/dL    A-G Ratio 1.1 1.1 - 2.2     PROTHROMBIN TIME + INR    Collection Time: 10/25/22 10:17 PM   Result Value Ref Range    INR 2.3 (H) 0.9 - 1.1      Prothrombin time 21.9 (H) 9.0 - 11.1 sec   TYPE & SCREEN    Collection Time: 10/25/22 10:17 PM   Result Value Ref Range    Crossmatch Expiration 10/28/2022,2359     ABO/Rh(D) A POSITIVE     Antibody screen NEG        Radiologic Studies -   CT ABD PELV W WO CONT   Final Result        CT ABD PELV W WO CONT    Result Date: 10/26/2022  Hepatic steatosis and cholelithiasis Nonspecific bowel gas pattern in patient with prior small bowel surgery. Incidental presumed node anterior to the liver   Medical Decision Making   I am the first provider for this patient. I reviewed the vital signs, available nursing notes, past medical history, past surgical history, family history and social history. Vital Signs-Reviewed the patient's vital signs.   Patient Vitals for the past 12 hrs:   Temp Pulse Resp BP SpO2   10/26/22 0039 -- 73 16 -- 99 %   10/26/22 0038 -- -- -- -- 98 %   10/26/22 0023 -- -- -- -- 96 %   10/26/22 0008 -- -- -- -- 96 %   10/25/22 2316 -- (!) 54 17 -- 95 %   10/25/22 2300 -- -- -- (!) 143/79 94 %   10/25/22 2245 -- -- -- (!) 140/78 94 % 10/25/22 2234 -- 71 18 (!) 143/81 96 %   10/25/22 2230 -- -- -- (!) 143/81 95 %   10/25/22 2215 -- -- -- (!) 149/86 --   10/25/22 2145 -- -- -- (!) 141/79 95 %   10/25/22 2056 98.3 °F (36.8 °C) (!) 57 20 (!) 148/87 96 %       Pulse Oximetry Analysis - 99% on ra    Cardiac Monitor:   Rate: 71 bpm  Rhythm: Normal Sinus Rhythm        Records Reviewed: Nursing Notes and Old Medical Records    Provider Notes (Medical Decision Making):   Patient presents with a chief complaint of rectal bleeding. Patient reports 2 episodes of bright red blood per rectum as well as some stool that he describes as [place]. \"  He also reports some abdominal pain though on exam he has no significant abdominal tenderness to palpation. He is nontoxic-appearing with stable vital signs. Differential includes diverticulosis, diverticulitis, colitis, GI bleeding. No significant abdominal pain or tenderness on exam to suggest ischemia. Patient appears quite comfortable. Will check basic lab work, coags, CT abdomen. ED Course:   Initial assessment performed. The patients presenting problems have been discussed, and they are in agreement with the care plan formulated and outlined with them. I have encouraged them to ask questions as they arise throughout their visit. Lab work with normal hemoglobin, INR 2.3. CT scan without acute findings. No active bleeding noted. Patient has been in the ED for over 4 hours without having an additional bowel movement. Discussed the option of transferring to a facility with GI as he is anticoagulated and had several episodes of rectal bleeding. Patient states that he is feeling better and since he has had no further episodes he would prefer to be discharged home and he will call his doctor for follow-up. He was advised that if he has any recurrence of bleeding or worsening symptoms he should return to the emergency department.        Procedures:  Procedures    Critical Care:  none    Disposition:  Discharge Note:  The patient has been re-evaluated and is ready for discharge. Reviewed available results with patient. Counseled patient on diagnosis and care plan. Patient has expressed understanding, and all questions have been answered. Patient agrees with plan and agrees to follow up as recommended, or to return to the ED if their symptoms worsen. Discharge instructions have been provided and explained to the patient, along with reasons to return to the ED. PLAN:  1. Discharge Medication List as of 10/26/2022 12:59 AM        2. Follow-up Information       Follow up With Specialties Details Why Contact Info    Batsheva Rojas MD Family Medicine Schedule an appointment as soon as possible for a visit   [place]   [address] [facility]  [phone]            Return to ED if worse     Diagnosis     Clinical Impression:   1. Rectal bleeding            Please note that this dictation was completed with aWhere, the computer voice recognition software. Quite often unanticipated grammatical, syntax, homophones, and other interpretive errors are inadvertently transcribed by the computer software. Please disregard these errors.   Please excuse any errors that have escaped final proofreading

## 2022-10-26 NOTE — DISCHARGE INSTRUCTIONS
You were seen for rectal bleeding. You had no additional bowel movements while in the emergency department. Your hemoglobin today was normal.  Your INR was 2.3. Your CT scan did not show any acute bleeding. We discussed the option of transfer to a facility with GI services, however you chose to go home. If you have any recurrence of symptoms please return to the nearest emergency department.

## 2022-11-15 NOTE — TELEPHONE ENCOUNTER
Omeprazole    Pt is requesting a refill on his Omeprazole.  Please send to [facility] in [place] if approved

## 2022-11-16 RX ORDER — OMEPRAZOLE 20 MG/1
20 CAPSULE, DELAYED RELEASE ORAL DAILY
Qty: 90 CAPSULE | Refills: 1 | Status: SHIPPED | OUTPATIENT
Start: 2022-11-16

## 2022-11-21 ENCOUNTER — OFFICE VISIT (OUTPATIENT)
Dept: FAMILY MEDICINE CLINIC | Age: 72
End: 2022-11-21
Payer: MEDICARE

## 2022-11-21 VITALS
DIASTOLIC BLOOD PRESSURE: 76 MMHG | HEIGHT: 68 IN | TEMPERATURE: 97.7 F | RESPIRATION RATE: 18 BRPM | HEART RATE: 72 BPM | OXYGEN SATURATION: 96 % | SYSTOLIC BLOOD PRESSURE: 117 MMHG | BODY MASS INDEX: 31.54 KG/M2 | WEIGHT: 208.13 LBS

## 2022-11-21 DIAGNOSIS — Z12.11 SCREENING FOR COLON CANCER: ICD-10-CM

## 2022-11-21 DIAGNOSIS — R35.0 BENIGN PROSTATIC HYPERPLASIA WITH URINARY FREQUENCY: ICD-10-CM

## 2022-11-21 DIAGNOSIS — K58.0 IRRITABLE BOWEL SYNDROME WITH DIARRHEA: ICD-10-CM

## 2022-11-21 DIAGNOSIS — K21.9 GASTROESOPHAGEAL REFLUX DISEASE WITHOUT ESOPHAGITIS: ICD-10-CM

## 2022-11-21 DIAGNOSIS — N40.1 BENIGN PROSTATIC HYPERPLASIA WITH URINARY FREQUENCY: ICD-10-CM

## 2022-11-21 DIAGNOSIS — E78.1 HYPERTRIGLYCERIDEMIA: ICD-10-CM

## 2022-11-21 DIAGNOSIS — Z79.01 LONG TERM (CURRENT) USE OF ANTICOAGULANTS: Primary | ICD-10-CM

## 2022-11-21 PROBLEM — E66.01 MORBID OBESITY (HCC): Status: ACTIVE | Noted: 2022-11-21

## 2022-11-21 LAB
INR BLD: 2.9
PT POC: 34.9 SECONDS
VALID INTERNAL CONTROL?: YES

## 2022-11-21 PROCEDURE — G8427 DOCREV CUR MEDS BY ELIG CLIN: HCPCS | Performed by: NURSE PRACTITIONER

## 2022-11-21 PROCEDURE — 85610 PROTHROMBIN TIME: CPT | Performed by: NURSE PRACTITIONER

## 2022-11-21 PROCEDURE — 1101F PT FALLS ASSESS-DOCD LE1/YR: CPT | Performed by: NURSE PRACTITIONER

## 2022-11-21 PROCEDURE — G8752 SYS BP LESS 140: HCPCS | Performed by: NURSE PRACTITIONER

## 2022-11-21 PROCEDURE — 1123F ACP DISCUSS/DSCN MKR DOCD: CPT | Performed by: NURSE PRACTITIONER

## 2022-11-21 PROCEDURE — G9717 DOC PT DX DEP/BP F/U NT REQ: HCPCS | Performed by: NURSE PRACTITIONER

## 2022-11-21 PROCEDURE — G8417 CALC BMI ABV UP PARAM F/U: HCPCS | Performed by: NURSE PRACTITIONER

## 2022-11-21 PROCEDURE — 99214 OFFICE O/P EST MOD 30 MIN: CPT | Performed by: NURSE PRACTITIONER

## 2022-11-21 PROCEDURE — G8536 NO DOC ELDER MAL SCRN: HCPCS | Performed by: NURSE PRACTITIONER

## 2022-11-21 PROCEDURE — G8754 DIAS BP LESS 90: HCPCS | Performed by: NURSE PRACTITIONER

## 2022-11-21 PROCEDURE — 3017F COLORECTAL CA SCREEN DOC REV: CPT | Performed by: NURSE PRACTITIONER

## 2022-11-21 RX ORDER — HYOSCYAMINE SULFATE 0.125 MG
TABLET ORAL
Qty: 60 TABLET | Refills: 5 | Status: SHIPPED | OUTPATIENT
Start: 2022-11-21

## 2022-11-21 NOTE — PROGRESS NOTES
Subjective:     CC: anticoagulation    Eva Davis is a 81 y.o. male who presents today to follow up for chronic anticoagulation. He takes Coumadin for hx of DVT back in 2011. He is supposed to be taking 3mg daily except one day a week take 2mg. Today he states he took it this way for the 1st week after his last appt and then ever since he started taking 3mg daily. He forget to take it last night, however, so today INR is at goal, 2.9. he was advised not to change the dose anymore on his own. He denies any s/s or abnormal bleeding. BPH   Symptoms: He does report some nocturia but drinks a lot of fluids before bed and does not want to break this habit. He is on Flomax daily. Gastroesophageal reflux disease   Symptoms well controlled on PPI. IBS-D  He needs a refill on his Hyoscamine. He takes it as needed for abdominal pain/ loose stool. DDD   He has chronic pain in the neck and back. He takes a muscle relaxor daily prn pain which helps relieve the pain. Because of his anticoagulation he has to avoid NSAID's. Hypertriglyceridemia    Lab Results   Component Value Date/Time    Cholesterol, total 153 03/26/2021 04:46 PM    HDL Cholesterol 40 03/26/2021 04:46 PM    LDL, calculated 60 03/26/2021 04:46 PM    VLDL, calculated 53 03/26/2021 04:46 PM    Triglyceride 265 (H) 03/26/2021 04:46 PM    CHOL/HDL Ratio 3.8 03/26/2021 04:46 PM     He is on fenofibrate for his hypertriglyceridemia and is overdue for a lipid panel. Health maintenance  Colonoscopy- due, referred to Dr Mor Johns today  PNA vaccines- declines  Flu shot- declines  Shingrix- decines  Covid vaccine- rec'd first 2 Moderna vaccines last year. Boosters:declines      Patient Active Problem List   Diagnosis Code    Long term (current) use of anticoagulants Z79.01    Depression F32. A    Impaired glucose tolerance R73.02    DJD (degenerative joint disease) M19.90    GERD (gastroesophageal reflux disease) K21.9    Anxiety F41.9    BPH (benign prostatic hyperplasia) N40.0    History of DVT (deep vein thrombosis) Z86.718    Hypertriglyceridemia E78.1    Lupus anticoagulant with hypercoagulable state ([facility] [address] D68.62    Tendinitis of right wrist M77.8    Poor dentition K08.9       Past Medical History:   Diagnosis Date    Anxiety     BPH (benign prostatic hyperplasia)     Chronic anticoagulation     Depression     DJD (degenerative joint disease)     DVT of leg (deep venous thrombosis) (HCC)     ED (erectile dysfunction)     GERD (gastroesophageal reflux disease)     Hypercoagulable state (HCC)     lupus coag    Impaired glucose tolerance          Current Outpatient Medications:     omeprazole (PRILOSEC) 20 mg capsule, Take 1 Capsule by mouth daily. , Disp: 90 Capsule, Rfl: 1    warfarin (COUMADIN) 3 mg tablet, Take 3mg daily except once a week take 2mg daily, Disp: 60 Tablet, Rfl: 0    warfarin (COUMADIN) 2 mg tablet, TAKE 1 TABLET BY MOUTH ONCE A WEEK, Disp: 30 Tablet, Rfl: 0    tamsulosin (Flomax) 0.4 mg capsule, Take 2 Capsules by mouth daily. , Disp: 180 Capsule, Rfl: 1    diazePAM (VALIUM) 10 mg tablet, Take 1 Tablet by mouth four (4) times daily as needed for Anxiety. Max Daily Amount: 40 mg., Disp: 120 Tablet, Rfl: 5    terazosin (HYTRIN) 5 mg capsule, Take 1 Capsule by mouth nightly., Disp: 90 Capsule, Rfl: 1    cyclobenzaprine (FLEXERIL) 10 mg tablet, TAKE 1 TABLET BY MOUTH THREE (3) TIMES DAILY AS NEEDED FOR MUSCLE SPASMS, Disp: 90 Tablet, Rfl: 1    mirtazapine (REMERON) 30 mg tablet, Take 1 Tablet by mouth nightly., Disp: 90 Tablet, Rfl: 3    hyoscyamine (ANASPAZ, LEVSIN) 0.125 mg tablet, TAKE 1 TABLET BY MOUTH EVERY 4 HOURS AS NEEDED FOR PAIN, Disp: , Rfl:     polyethylene glycol (MIRALAX) 17 gram/dose powder, Take 17 g by mouth daily.  (Patient not taking: Reported on 10/25/2022), Disp: 527 g, Rfl: 3    Allergies   Allergen Reactions    Haldol [Haloperidol Lactate] Rash       Past Surgical History:   Procedure Laterality Date    HX ENDOSCOPY 2011    [place] ABDOMEN SURGERY PROC UNLISTED  2011    ischemic bowel d/t hypercoagulable state       Social History     Tobacco Use   Smoking Status Never   Smokeless Tobacco Never       Social History     Socioeconomic History    Marital status:    Tobacco Use    Smoking status: Never    Smokeless tobacco: Never   Vaping Use    Vaping Use: Never used   Substance and Sexual Activity    Alcohol use: No    Drug use: Yes     Types: Marijuana    Sexual activity: Yes       Family History   Problem Relation Age of Onset    Heart Disease Father     Heart Disease Brother        ROS:  Gen: denies fever, chills, or fatigue  HEENT:denies H/A, nasal congestion, or sore throat  Resp: denies dyspnea, cough, or wheezing  CV: denies chest pain, pressure, or palpitations  Extremeties: denies edema  GI[de-identified] +occas abdominal pain and diarrhea denies N/V, hematochezia, or melena  : +nocturia, denies dysuria or hematuria  Musculoskeletal: +chronic neck and back pain  Neuro: denies numbness/tingling, extremity weakness, or dizziness  Skin: denies rashes or new lesions     Objective:     Visit Vitals  /76 (BP 1 Location: Left upper arm)   Pulse 72   Temp 97.7 °F (36.5 °C) (Temporal)   Resp 18   Ht 5' 8\" (1.727 m)   Wt 208 lb 2 oz (94.4 kg)   SpO2 96%   BMI 31.65 kg/m²       General: Alert and oriented. No acute distress. Well nourished. HEENT :  Eyes: Sclera white, conjunctiva clear. PERRLA. Extra ocular movements intact. Neck: Supple with FROM. Lungs: Breathing even and unlabored. All lobes clear to auscultation bilaterally   Heart :RRR, S1 and S2 normal intensity, no extra heart sounds  Extremities: Non-edematous. Neuro: Cranial nerves grossly normal.  Psych: Mood and thought content appropriate for situation. Dressed appropriately and with good hygiene.   Skin: Warm and dry and intact    Assessment/ Plan:     Chronic anticoagulation  INR today is at goal, 2.9   Cont Coumadin 3mg daily except for once a week take 2mg  Notify provider or go to ER for s/s of abnormal bleeding  F/U 1 month    BPH   He does report some nocturia but drinks a lot of fluids before bed and does not want to break this habit. Cont Flomax daily. F/U 6 months or sooner prn if symptoms worsen    Gastroesophageal reflux disease   Symptoms well controlled   Cont PPI. Avoid triggers  F/U 6 months or sooner prn if symptoms worsen    IBS-D  Stable  Cont Hyoscamine prn adominal pain/ loose stool. DDD   Stable  Cont Flexeril daily prn pain   Because of his anticoagulation he has to avoid NSAID's. Consider PT if pain worsens    Hypertriglyceridemia  Will check lipid panel another day when he is fasting  Cont fenofibrate   Low fat diet      Health maintenance  Colonoscopy- due, referred to Dr Crow Fitzpatrick today  PNA vaccines- declines  Flu shot- declines  Shingrix- decines  Covid vaccine- rec'd first 2 Moderna vaccines last year. Boosters:declines            Verbal and written instructions (see AVS) provided. Patient expresses understanding of diagnosis and treatment plan. Health Maintenance Due   Topic Date Due    DTaP/Tdap/Td series (1 - Tdap) Never done    Colorectal Cancer Screening Combo  Never done    Shingrix Vaccine Age 50> (1 of 2) Never done    Pneumococcal 65+ years (1 - PCV) Never done    COVID-19 Vaccine (3 - Booster for Moderna series) 10/02/2021    Flu Vaccine (1) Never done               Abdelrahman WOOD  Doveralla Quesada NP

## 2022-11-21 NOTE — PROGRESS NOTES
1. \"Have you been to the ER, urgent care clinic since your last visit? Hospitalized since your last visit? \" Yes When: ER 10-25-22    2. \"Have you seen or consulted any other health care providers outside of the [address] [place] [address] since your last visit? \" No     3. For patients aged 39-70: Has the patient had a colonoscopy / FIT/ Cologuard? No      If the patient is female:    4. For patients aged 41-77: Has the patient had a mammogram within the past 2 years? NA - based on age or sex      11. For patients aged 21-65: Has the patient had a pap smear?  NA - based on age or sex

## 2022-11-23 ENCOUNTER — HOSPITAL ENCOUNTER (OUTPATIENT)
Dept: BEHAVIORAL/MENTAL HEALTH | Age: 72
Discharge: HOME | End: 2022-11-23
Payer: MEDICARE

## 2022-11-23 PROCEDURE — 99214 OFFICE O/P EST MOD 30 MIN: CPT | Performed by: PSYCHIATRY & NEUROLOGY

## 2022-11-23 NOTE — PROGRESS NOTES
INTERVAL HISTORY (In Person):  Mr. Armani Maloney is a 19-year-old  white male following up with me 6+ months since his last appointment regarding a longstanding history of severe Generalized Anxiety Disorder as well as a remote history of Depression, for which he has been in remission for some time. He has been maintained on Diazepam for over 50 years as noted below, with reasonable control of his anxiety noted. He has historically been very sensitive to other medications including other benzos, and has been unable to tolerate many, many meds, but the Valium is something he has tolerated, and has utilized on a controlled basis. Over the past several years we've also tried Fluoxetine for the mild dysphoria he often has, and more recently we've used Mirtazapine to also help with sleep (and mood). He was more stressed last time, mostly due to financial issues, but he didn't want to try and change anything. He comes in today stating that he's been feeling \"a little better\" more recently, and that he's not under the financial stress he was last time. His wife is now getting a $1100 monthly check which has helped them out tremendously. He reports feeling mostly stable and euthymic, and that his biggest issue continues to be his disappointment that he doesn't have an attractive and smart women to talk to and possibly have some sexual interaction with. He's spending about 20 hours a week talking to women online (sexually), and that satisfies that need partially. Has trouble accepting that he's getting older and is no longer attractive, etc. He's taking the Mirtazapine regularly now and denies having any SE's with it. He's taking the Diazepam regularly as well, but usually 3 pills per day. Discussed his situation and he states that his wife's paranoia is slightly better since moving to [facility], but it's still there. No notable N/V dysfunction reported and no med SE's either.        CURRENT MEDICATIONS:  1.  Diazepam 10 mg qid prn--averages 3-3.5/day  2. Mirtazapine 30 mg qhs prn (30 mg)--taking nightly     MENTAL STATUS EXAM:  Mr. Flaquita Helm was noted to be a casually dressed, adequately groomed 27-year-old who appeared his stated age. He was fairly pleasant and cooperative, and exhibited a slightly miller affective range this time. He reported little to no depressive symptoms now, and he denied any feelings of hopeless or suicidal thinking. There was no evidence of any breana or hypomania, nor any symptoms of psychosis. His anxiety is still evident but reasonably well controlled, overall. His judgment and insight appear to be fair, and his cognitive functioning appeared to be only slightly impaired. DIAGNOSTIC IMPRESSION:                     Axis I       Generalized Anxiety Disorder, mild now (F41.1)                   Major Depression, mild (F33.0)  Axis II      Deferred. Axis III     Degenerative joint disease; history of DVT; lupus; history of bowel resection; chronic pain issues. PLAN:  1. Continue the Diazepam at 10 mg qid prn--has 2- months of refills (30 day). 2.  Continue the Mirtazapine at 30 mg qhs prn (30 mg)--#30 with 5 refills (30 day). 3.  Follow up within the next 3 months at his request, sooner if needed.

## 2022-11-28 DIAGNOSIS — D68.59 HYPERCOAGULABLE STATE (HCC): ICD-10-CM

## 2022-11-28 RX ORDER — WARFARIN 3 MG/1
TABLET ORAL
Qty: 60 TABLET | Refills: 0 | Status: SHIPPED | OUTPATIENT
Start: 2022-11-28

## 2022-12-28 ENCOUNTER — OFFICE VISIT (OUTPATIENT)
Dept: FAMILY MEDICINE CLINIC | Age: 72
End: 2022-12-28
Payer: MEDICARE

## 2022-12-28 VITALS
DIASTOLIC BLOOD PRESSURE: 73 MMHG | HEART RATE: 63 BPM | OXYGEN SATURATION: 97 % | WEIGHT: 212 LBS | HEIGHT: 68 IN | BODY MASS INDEX: 32.13 KG/M2 | SYSTOLIC BLOOD PRESSURE: 116 MMHG | RESPIRATION RATE: 18 BRPM

## 2022-12-28 DIAGNOSIS — Z79.01 LONG TERM (CURRENT) USE OF ANTICOAGULANTS: Primary | ICD-10-CM

## 2022-12-28 LAB
INR BLD: 2.8 (ref 2–3)
PT POC: 33.1 SECONDS (ref 10.4–14)
VALID INTERNAL CONTROL?: YES

## 2022-12-28 PROCEDURE — G8427 DOCREV CUR MEDS BY ELIG CLIN: HCPCS | Performed by: NURSE PRACTITIONER

## 2022-12-28 PROCEDURE — G8536 NO DOC ELDER MAL SCRN: HCPCS | Performed by: NURSE PRACTITIONER

## 2022-12-28 PROCEDURE — 1123F ACP DISCUSS/DSCN MKR DOCD: CPT | Performed by: NURSE PRACTITIONER

## 2022-12-28 PROCEDURE — 1101F PT FALLS ASSESS-DOCD LE1/YR: CPT | Performed by: NURSE PRACTITIONER

## 2022-12-28 PROCEDURE — G8417 CALC BMI ABV UP PARAM F/U: HCPCS | Performed by: NURSE PRACTITIONER

## 2022-12-28 PROCEDURE — 3017F COLORECTAL CA SCREEN DOC REV: CPT | Performed by: NURSE PRACTITIONER

## 2022-12-28 PROCEDURE — 85610 PROTHROMBIN TIME: CPT | Performed by: NURSE PRACTITIONER

## 2022-12-28 PROCEDURE — G9717 DOC PT DX DEP/BP F/U NT REQ: HCPCS | Performed by: NURSE PRACTITIONER

## 2022-12-28 PROCEDURE — 99213 OFFICE O/P EST LOW 20 MIN: CPT | Performed by: NURSE PRACTITIONER

## 2022-12-28 NOTE — PROGRESS NOTES
1. \"Have you been to the ER, urgent care clinic since your last visit? Hospitalized since your last visit? \" No    2. \"Have you seen or consulted any other health care providers outside of the [address] [place] [address] since your last visit? \" No     3. For patients aged 39-70: Has the patient had a colonoscopy / FIT/ Cologuard? No      If the patient is female:    4. For patients aged 41-77: Has the patient had a mammogram within the past 2 years? NA - based on age or sex      11. For patients aged 21-65: Has the patient had a pap smear?  NA - based on age or sex

## 2022-12-28 NOTE — PROGRESS NOTES
Subjective:     CC: anticoagulation    Yessy Nolasco is a 81 y.o. male who presents today for a 1 month follow up for chronic anticoagulation. He takes Coumadin for hx of DVT back in 2011. He is taking 3mg daily except Tuesdays he takes 2mg. The last INR was at goal, 2.9. Today it is at goal again at 2.8  He denies any s/s or abnormal bleeding. Lab Results   Component Value Date/Time    Hemoglobin (POC) 22.8 09/07/2022 03:16 PM    HGB 15.4 10/25/2022 10:17 PM       Patient Active Problem List   Diagnosis Code    Long term (current) use of anticoagulants Z79.01    Depression F32. A    Impaired glucose tolerance R73.02    DJD (degenerative joint disease) M19.90    GERD (gastroesophageal reflux disease) K21.9    Anxiety F41.9    BPH (benign prostatic hyperplasia) N40.0    History of DVT (deep vein thrombosis) Z86.718    Hypertriglyceridemia E78.1    Lupus anticoagulant with hypercoagulable state ([facility] [address] D68.62    Tendinitis of right wrist M77.8    Poor dentition K08.9    Irritable bowel syndrome with diarrhea K58.0       Past Medical History:   Diagnosis Date    Anxiety     BPH (benign prostatic hyperplasia)     Chronic anticoagulation     Depression     DJD (degenerative joint disease)     DVT of leg (deep venous thrombosis) (HCC)     ED (erectile dysfunction)     GERD (gastroesophageal reflux disease)     Hypercoagulable state (HCC)     lupus coag    Impaired glucose tolerance          Current Outpatient Medications:     terazosin (HYTRIN) 5 mg capsule, Take 1 Capsule by mouth nightly., Disp: 90 Capsule, Rfl: 1    warfarin (COUMADIN) 3 mg tablet, TAKE 1 TABLET TUESDAY THURSDAY SATURDAY SUNDAY, Disp: 60 Tablet, Rfl: 0    hyoscyamine (ANASPAZ, LEVSIN) 0.125 mg tablet, TAKE 1 TABLET BY MOUTH EVERY 4 HOURS AS NEEDED FOR PAIN, Disp: 60 Tablet, Rfl: 5    omeprazole (PRILOSEC) 20 mg capsule, Take 1 Capsule by mouth daily. , Disp: 90 Capsule, Rfl: 1    warfarin (COUMADIN) 2 mg tablet, TAKE 1 TABLET BY MOUTH ONCE A WEEK, Disp: 30 Tablet, Rfl: 0    diazePAM (VALIUM) 10 mg tablet, Take 1 Tablet by mouth four (4) times daily as needed for Anxiety. Max Daily Amount: 40 mg., Disp: 120 Tablet, Rfl: 5    cyclobenzaprine (FLEXERIL) 10 mg tablet, TAKE 1 TABLET BY MOUTH THREE (3) TIMES DAILY AS NEEDED FOR MUSCLE SPASMS, Disp: 90 Tablet, Rfl: 1    mirtazapine (REMERON) 30 mg tablet, Take 1 Tablet by mouth nightly., Disp: 90 Tablet, Rfl: 3    polyethylene glycol (MIRALAX) 17 gram/dose powder, Take 17 g by mouth daily. , Disp: 527 g, Rfl: 3    Allergies   Allergen Reactions    Haldol [Haloperidol Lactate] Rash       Past Surgical History:   Procedure Laterality Date    HX ENDOSCOPY  2011    [place] ABDOMEN SURGERY PROC UNLISTED  2011    ischemic bowel d/t hypercoagulable state       Social History     Tobacco Use   Smoking Status Never   Smokeless Tobacco Never       Social History     Socioeconomic History    Marital status:    Tobacco Use    Smoking status: Never    Smokeless tobacco: Never   Vaping Use    Vaping Use: Never used   Substance and Sexual Activity    Alcohol use: No    Drug use: Yes     Types: Marijuana    Sexual activity: Yes       Family History   Problem Relation Age of Onset    Heart Disease Father     Heart Disease Brother        ROS:  Gen: denies fever, chills, or fatigue  HEENT: denies epistaxis or bleeding gums  Resp: denies dyspnea, cough, or wheezing  CV: denies chest pain, pressure, or palpitations  Extremeties: denies edema  GI[de-identified] +occas abdominal pain and diarrhea denies N/V, hematochezia, or melena  : +nocturia, denies dysuria or hematuria  Musculoskeletal: +chronic neck and back pain  Neuro: denies numbness/tingling, extremity weakness, or dizziness  Skin: denies rashes or new lesions     Objective:     Visit Vitals  /73 (BP 1 Location: Left upper arm, BP Patient Position: Sitting, BP Cuff Size: Adult)   Pulse 63   Resp 18   Ht 5' 8\" (1.727 m)   Wt 212 lb (96.2 kg)   SpO2 97%   BMI 32.23 kg/m² General: Alert and oriented. No acute distress. Well nourished. HEENT :  Eyes: Sclera white, conjunctiva clear. PERRLA. Extra ocular movements intact. Neck: Supple with FROM. Lungs: Breathing even and unlabored. All lobes clear to auscultation bilaterally   Heart :RRR, S1 and S2 normal intensity, no extra heart sounds  Extremities: Non-edematous. Neuro: Cranial nerves grossly normal.  Psych: Mood and thought content appropriate for situation. Dressed appropriately and with good hygiene. Skin: Warm and dry and intact    Assessment/ Plan:     Chronic anticoagulation  INR today is at goal, 2.8  Cont Coumadin 3mg daily except for Tuesdays take 2mg  Notify provider or go to ER for s/s of abnormal bleeding  F/U 1 month        Verbal and written instructions (see AVS) provided. Patient expresses understanding of diagnosis and treatment plan. Health Maintenance Due   Topic Date Due    DTaP/Tdap/Td series (1 - Tdap) Never done    Colorectal Cancer Screening Combo  Never done               Mariola Raymundo, KAUSHAL

## 2023-01-05 ENCOUNTER — TELEPHONE (OUTPATIENT)
Dept: FAMILY MEDICINE CLINIC | Age: 73
End: 2023-01-05
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
## 2023-01-18 DIAGNOSIS — F41.1 GENERALIZED ANXIETY DISORDER: ICD-10-CM

## 2023-01-18 RX ORDER — DIAZEPAM 10 MG/1
10 TABLET ORAL
Qty: 120 TABLET | Refills: 5 | Status: SHIPPED | OUTPATIENT
Start: 2023-01-18

## 2023-02-13 RX ORDER — MIRTAZAPINE 30 MG/1
30 TABLET, FILM COATED ORAL
Qty: 90 TABLET | Refills: 3 | Status: SHIPPED | OUTPATIENT
Start: 2023-02-13

## 2023-02-16 RX ORDER — OMEPRAZOLE 20 MG/1
CAPSULE, DELAYED RELEASE ORAL
Qty: 90 CAPSULE | Refills: 1 | Status: SHIPPED | OUTPATIENT
Start: 2023-02-16

## 2023-02-24 ENCOUNTER — HOSPITAL ENCOUNTER (OUTPATIENT)
Dept: BEHAVIORAL/MENTAL HEALTH | Age: 73
Discharge: HOME | End: 2023-02-24
Payer: MEDICARE

## 2023-02-24 PROCEDURE — 99214 OFFICE O/P EST MOD 30 MIN: CPT | Performed by: PSYCHIATRY & NEUROLOGY

## 2023-02-24 NOTE — PROGRESS NOTES
INTERVAL HISTORY (In Person):  Mr. Natasha Lara is a 82-year-old  white male following up with me 3 months since his last appointment regarding a longstanding history of severe Generalized Anxiety Disorder as well as a remote history of Depression, for which he has been in remission for some time. He has been maintained on Diazepam for over 50 years as noted below, with reasonable control of his anxiety noted. He has historically been very sensitive to other medications including other benzos, and has been unable to tolerate many, many meds, but the Valium is something he has tolerated, and has utilized on a controlled basis. Over the past several years we've also tried Fluoxetine for the mild dysphoria he often has, and more recently we've used Mirtazapine to also help with sleep (and mood). He was feeling slightly better the last time, mostly due to fewer financial concerns. He comes in today stating that he's been feeling \"OK\", although he says he's been stressed about a number of political issues surrounding [place] propaganda and the [place] war. This is c/w his long-held socialist and anti-american beliefs, and he's not overly distressed by it all. His mood has been full and fairly bright, and he denies any N/V dysfunction or any SI at all. He's taking the Remeron and Diazepam regularly now and denies having any SE's with them. Under much less financial stress now as his wife is now getting a $1100 a month in [place] [address]. He also continues to be disappointed that he doesn't have an attractive and smart woman to talk to and have some sexual interaction with--says [place] [facility] [place] as the online stuff isn't stimulating enough anymore. CURRENT MEDICATIONS:  1.  Diazepam 10 mg qid prn--averages 3-3.5/day  2.   Mirtazapine 30 mg qhs prn (30 mg)--taking nightly     MENTAL STATUS EXAM:  Mr. Natasha Lara was noted to be a casually dressed, adequately groomed 72-year-old who appeared his stated age. He was pleasant and cooperative, and exhibited a fairly full affective range this time. He reported little to no depressive symptoms now, and he denied any feelings of hopeless or suicidal thinking. There was no evidence of any breana or hypomania, nor any symptoms of psychosis. His anxiety is still evident but reasonably well controlled, overall. His judgment and insight appear to be fair, and his cognitive functioning appeared to be only slightly impaired. DIAGNOSTIC IMPRESSION:                     Axis I       Generalized Anxiety Disorder, mild now (F41.1)                   Major Depression, mild (F33.0)  Axis II      Deferred. Axis III     Degenerative joint disease; history of DVT; lupus; history of bowel resection; chronic pain issues. PLAN:  1. Continue the Diazepam at 10 mg qid prn--has 5- months of refills (30 day). 2.  Continue the Mirtazapine at 30 mg qhs prn (30 mg)--has 11.5 months of refills (90 day). 3.  Follow up within the next 3 months at his request, sooner if needed.

## 2023-03-01 ENCOUNTER — OFFICE VISIT (OUTPATIENT)
Dept: FAMILY MEDICINE CLINIC | Age: 73
End: 2023-03-01
Payer: MEDICARE

## 2023-03-01 VITALS
SYSTOLIC BLOOD PRESSURE: 119 MMHG | HEIGHT: 68 IN | HEART RATE: 66 BPM | DIASTOLIC BLOOD PRESSURE: 84 MMHG | RESPIRATION RATE: 18 BRPM | BODY MASS INDEX: 32.23 KG/M2 | OXYGEN SATURATION: 98 %

## 2023-03-01 DIAGNOSIS — M15.9 PRIMARY OSTEOARTHRITIS INVOLVING MULTIPLE JOINTS: ICD-10-CM

## 2023-03-01 DIAGNOSIS — Z86.718 HISTORY OF DVT (DEEP VEIN THROMBOSIS): ICD-10-CM

## 2023-03-01 DIAGNOSIS — Z79.01 ANTICOAGULATED: Primary | ICD-10-CM

## 2023-03-01 PROBLEM — F33.0 MAJOR DEPRESSIVE DISORDER, RECURRENT, MILD (HCC): Status: ACTIVE | Noted: 2023-03-01

## 2023-03-01 LAB
INR BLD: 2.6 (ref 2–3)
PT POC: 33 SECONDS (ref 10.4–14)
VALID INTERNAL CONTROL?: YES

## 2023-03-01 PROCEDURE — G8536 NO DOC ELDER MAL SCRN: HCPCS | Performed by: NURSE PRACTITIONER

## 2023-03-01 PROCEDURE — G9717 DOC PT DX DEP/BP F/U NT REQ: HCPCS | Performed by: NURSE PRACTITIONER

## 2023-03-01 PROCEDURE — 1123F ACP DISCUSS/DSCN MKR DOCD: CPT | Performed by: NURSE PRACTITIONER

## 2023-03-01 PROCEDURE — G8427 DOCREV CUR MEDS BY ELIG CLIN: HCPCS | Performed by: NURSE PRACTITIONER

## 2023-03-01 PROCEDURE — 1101F PT FALLS ASSESS-DOCD LE1/YR: CPT | Performed by: NURSE PRACTITIONER

## 2023-03-01 PROCEDURE — 85610 PROTHROMBIN TIME: CPT | Performed by: NURSE PRACTITIONER

## 2023-03-01 PROCEDURE — 99213 OFFICE O/P EST LOW 20 MIN: CPT | Performed by: NURSE PRACTITIONER

## 2023-03-01 PROCEDURE — 3017F COLORECTAL CA SCREEN DOC REV: CPT | Performed by: NURSE PRACTITIONER

## 2023-03-01 PROCEDURE — G8417 CALC BMI ABV UP PARAM F/U: HCPCS | Performed by: NURSE PRACTITIONER

## 2023-03-01 RX ORDER — WARFARIN 2 MG/1
TABLET ORAL
Qty: 30 TABLET | Refills: 0
Start: 2023-03-01

## 2023-03-01 RX ORDER — CYCLOBENZAPRINE HCL 10 MG
TABLET ORAL
Qty: 90 TABLET | Refills: 0 | Status: SHIPPED | OUTPATIENT
Start: 2023-03-01

## 2023-03-01 RX ORDER — WARFARIN 3 MG/1
TABLET ORAL
Qty: 90 TABLET | Refills: 0 | Status: SHIPPED | OUTPATIENT
Start: 2023-03-01

## 2023-03-01 NOTE — PROGRESS NOTES
1. \"Have you been to the ER, urgent care clinic since your last visit? Hospitalized since your last visit? \" No    2. \"Have you seen or consulted any other health care providers outside of the [address] [place] [address] since your last visit? \" No     3. For patients aged 39-70: Has the patient had a colonoscopy / FIT/ Cologuard? No      If the patient is female:    4. For patients aged 41-77: Has the patient had a mammogram within the past 2 years? NA - based on age or sex      11. For patients aged 21-65: Has the patient had a pap smear?  NA - based on age or sex

## 2023-03-02 ENCOUNTER — TELEPHONE (OUTPATIENT)
Dept: SURGERY | Age: 73
End: 2023-03-02

## 2023-03-25 DIAGNOSIS — I10 ESSENTIAL HYPERTENSION: ICD-10-CM

## 2023-03-27 RX ORDER — TERAZOSIN 5 MG/1
5 CAPSULE ORAL
Qty: 90 CAPSULE | Refills: 1 | Status: SHIPPED | OUTPATIENT
Start: 2023-03-27

## 2023-05-28 DIAGNOSIS — D68.59 OTHER PRIMARY THROMBOPHILIA (HCC): ICD-10-CM

## 2023-05-30 RX ORDER — WARFARIN SODIUM 3 MG/1
TABLET ORAL
Qty: 90 TABLET | Refills: 0 | Status: SHIPPED | OUTPATIENT
Start: 2023-05-30 | End: 2023-06-05 | Stop reason: SDUPTHER

## 2023-05-31 DIAGNOSIS — M15.9 POLYOSTEOARTHRITIS, UNSPECIFIED: ICD-10-CM

## 2023-05-31 NOTE — TELEPHONE ENCOUNTER
Patient requesting refill on     Requested Prescriptions     Pending Prescriptions Disp Refills    cyclobenzaprine (FLEXERIL) 10 MG tablet [Pharmacy Med Name: CYCLOBENZAPRINE 10 MG TABLET] 90 tablet      Sig: TAKE 1 TABLET BY MOUTH EVERY DAY AS NEEDED FOR PAIN         Last OV 3/1/23

## 2023-06-01 ENCOUNTER — HOSPITAL ENCOUNTER (OUTPATIENT)
Facility: HOSPITAL | Age: 73
Discharge: HOME | End: 2023-06-01
Payer: MEDICARE

## 2023-06-01 ENCOUNTER — OFFICE VISIT (OUTPATIENT)
Age: 73
End: 2023-06-01
Payer: MEDICARE

## 2023-06-01 VITALS
BODY MASS INDEX: 30.07 KG/M2 | DIASTOLIC BLOOD PRESSURE: 86 MMHG | WEIGHT: 198.4 LBS | HEIGHT: 68 IN | TEMPERATURE: 98.6 F | RESPIRATION RATE: 16 BRPM | OXYGEN SATURATION: 97 % | HEART RATE: 79 BPM | SYSTOLIC BLOOD PRESSURE: 133 MMHG

## 2023-06-01 DIAGNOSIS — M79.674 CHRONIC TOE PAIN, RIGHT FOOT: ICD-10-CM

## 2023-06-01 DIAGNOSIS — Z71.89 ACP (ADVANCE CARE PLANNING): ICD-10-CM

## 2023-06-01 DIAGNOSIS — S99.922A INJURY OF TOE ON LEFT FOOT, INITIAL ENCOUNTER: ICD-10-CM

## 2023-06-01 DIAGNOSIS — G89.29 CHRONIC TOE PAIN, RIGHT FOOT: ICD-10-CM

## 2023-06-01 DIAGNOSIS — Z79.01 LONG TERM (CURRENT) USE OF ANTICOAGULANTS: Primary | ICD-10-CM

## 2023-06-01 DIAGNOSIS — Z00.00 MEDICARE ANNUAL WELLNESS VISIT, SUBSEQUENT: ICD-10-CM

## 2023-06-01 DIAGNOSIS — E78.1 HYPERTRIGLYCERIDEMIA: ICD-10-CM

## 2023-06-01 LAB
POC INR: 2.3
PROTHROMBIN TIME, POC: 27

## 2023-06-01 PROCEDURE — 73620 X-RAY EXAM OF FOOT: CPT

## 2023-06-01 PROCEDURE — 85610 PROTHROMBIN TIME: CPT | Performed by: NURSE PRACTITIONER

## 2023-06-01 RX ORDER — CYCLOBENZAPRINE HCL 10 MG
TABLET ORAL
Qty: 90 TABLET | Refills: 0 | Status: SHIPPED | OUTPATIENT
Start: 2023-06-01

## 2023-06-01 ASSESSMENT — PATIENT HEALTH QUESTIONNAIRE - PHQ9
2. FEELING DOWN, DEPRESSED OR HOPELESS: 1
8. MOVING OR SPEAKING SO SLOWLY THAT OTHER PEOPLE COULD HAVE NOTICED. OR THE OPPOSITE, BEING SO FIGETY OR RESTLESS THAT YOU HAVE BEEN MOVING AROUND A LOT MORE THAN USUAL: 0
SUM OF ALL RESPONSES TO PHQ QUESTIONS 1-9: 9
SUM OF ALL RESPONSES TO PHQ QUESTIONS 1-9: 9
SUM OF ALL RESPONSES TO PHQ9 QUESTIONS 1 & 2: 3
1. LITTLE INTEREST OR PLEASURE IN DOING THINGS: 2
7. TROUBLE CONCENTRATING ON THINGS, SUCH AS READING THE NEWSPAPER OR WATCHING TELEVISION: 0
6. FEELING BAD ABOUT YOURSELF - OR THAT YOU ARE A FAILURE OR HAVE LET YOURSELF OR YOUR FAMILY DOWN: 2
10. IF YOU CHECKED OFF ANY PROBLEMS, HOW DIFFICULT HAVE THESE PROBLEMS MADE IT FOR YOU TO DO YOUR WORK, TAKE CARE OF THINGS AT HOME, OR GET ALONG WITH OTHER PEOPLE: 1
9. THOUGHTS THAT YOU WOULD BE BETTER OFF DEAD, OR OF HURTING YOURSELF: 0
4. FEELING TIRED OR HAVING LITTLE ENERGY: 2
SUM OF ALL RESPONSES TO PHQ QUESTIONS 1-9: 9
SUM OF ALL RESPONSES TO PHQ QUESTIONS 1-9: 9
3. TROUBLE FALLING OR STAYING ASLEEP: 0
5. POOR APPETITE OR OVEREATING: 2

## 2023-06-01 ASSESSMENT — LIFESTYLE VARIABLES
HOW OFTEN DO YOU HAVE A DRINK CONTAINING ALCOHOL: NEVER
HOW MANY STANDARD DRINKS CONTAINING ALCOHOL DO YOU HAVE ON A TYPICAL DAY: PATIENT DOES NOT DRINK

## 2023-06-01 NOTE — PATIENT INSTRUCTIONS
change to stay safe as your body and health change. If you tend to feel dizzy after you take medicine, avoid activity at that time. Try being active before you take your medicine. This will reduce your risk of falls. If you plan to be active at home, make sure to clear your space before you get started. Remove things like TV cords, coffee tables, and throw rugs. It's safest to have plenty of space to move freely. The key to getting more active is to take it slow and steady. Try to improve only a little bit at a time. Pick just one area to improve on at first. And if an activity hurts, stop and talk to your doctor. Where can you learn more? Go to http://www.la.com/ and enter P600 to learn more about \"Learning About Being Active as an Older Adult. \"  Current as of: October 10, 2022               Content Version: 13.6  © 2006-2023 [employer]. Care instructions adapted under license by [facility] ([facility]). If you have questions about a medical condition or this instruction, always ask your healthcare professional. [place] [address] any warranty or liability for your use of this information. Learning About Dental Care for Older Adults  Dental care for older adults: Overview  Dental care for older people is much the same as for younger adults. But older adults do have concerns that younger adults do not. Older adults may have problems with gum disease and decay on the roots of their teeth. They may need missing teeth replaced or broken fillings fixed. Or they may have dentures that need to be cared for. Some older adults may have trouble holding a toothbrush. You can help remind the person you are caring for to brush and floss their teeth or to clean their dentures. In some cases, you may need to do the brushing and other dental care tasks. People who have trouble using their hands or who have dementia may need this extra help.   How can you help with dental

## 2023-06-01 NOTE — PROGRESS NOTES
1. \"Have you been to the ER, urgent care clinic since your last visit? Hospitalized since your last visit? \" No    2. \"Have you seen or consulted any other health care providers outside of the [address] [place] [address] since your last visit? \" No     3. For patients aged 39-70: Has the patient had a colonoscopy / FIT/ Cologuard? No     If the patient is female:    4. For patients aged 41-77: Has the patient had a mammogram within the past 2 years? No    5. For patients aged 21-65: Has the patient had a pap smear?  NA - based on age
Patient labs drawn in left arm per NP orders. Patient tolerated well.
treatment     Drug Use:    DAST-10 Score: 2     Interpretation:  1-2: Low level - Monitor, re-assess at a later date  3-5: Moderate level - Further Investigation  6-8: Substantial level - Intensive Assessment  9-10: Severe level - Intensive Assessment    Interventions: Will cont to monitor. General HRA Questions:  Select all that apply: (!) Loneliness    Loneliness Interventions:  Patient declined any further interventions or treatment       Weight and Activity:  Physical Activity: Inactive    Days of Exercise per Week: 0 days    Minutes of Exercise per Session: 0 min     On average, how many days per week do you engage in moderate to strenuous exercise (like a brisk walk)?: 0 days  Have you lost any weight without trying in the past 3 months?: (!) Yes  Body mass index is 30.17 kg/m². (!) Abnormal    Inactivity Interventions:  Patient declined any further interventions or treatment    Unintentional Weight Loss Interventions:  Patient declined any further interventions or treatment  Obesity Interventions:  Patient declines any further evaluation or treatment          Dentist Screen:  Have you seen the dentist within the past year?: (!) No    Intervention:  He does not have teeth     Hearing Screen:  Do you or your family notice any trouble with your hearing that hasn't been managed with hearing aids?: (!) Yes    Interventions:  Patient declines any further evaluation or treatment    Vision Screen:  Do you have difficulty driving, watching TV, or doing any of your daily activities because of your eyesight?: No  Have you had an eye exam within the past year?: (!) No  No results found.     Interventions:   Patient comments: plans on scheduling eye exam soon    Safety:  Do you have any tripping hazards - loose or unsecured carpets or rugs?: (!) Yes  Do you have either shower bars, grab bars, non-slip mats or non-slip surfaces in your shower or bathtub?: (!) No  Interventions:  Patient declined any further

## 2023-06-02 ENCOUNTER — CLINICAL DOCUMENTATION (OUTPATIENT)
Dept: SPIRITUAL SERVICES | Age: 73
End: 2023-06-02

## 2023-06-02 ENCOUNTER — HOSPITAL ENCOUNTER (OUTPATIENT)
Facility: HOSPITAL | Age: 73
Discharge: HOME | End: 2023-06-05
Payer: MEDICARE

## 2023-06-02 LAB
ALBUMIN SERPL-MCNC: 4.2 G/DL (ref 3.5–5)
ALBUMIN/GLOB SERPL: 1.4 (ref 1.1–2.2)
ALP SERPL-CCNC: 56 U/L (ref 45–117)
ALT SERPL-CCNC: 35 U/L (ref 12–78)
ANION GAP SERPL CALC-SCNC: 5 MMOL/L (ref 5–15)
AST SERPL-CCNC: 23 U/L (ref 15–37)
BASOPHILS # BLD: 0.1 K/UL (ref 0–0.1)
BASOPHILS NFR BLD: 1 % (ref 0–1)
BILIRUB SERPL-MCNC: 0.5 MG/DL (ref 0.2–1)
BUN SERPL-MCNC: 11 MG/DL (ref 6–20)
BUN/CREAT SERPL: 9 (ref 12–20)
CALCIUM SERPL-MCNC: 9 MG/DL (ref 8.5–10.1)
CHLORIDE SERPL-SCNC: 105 MMOL/L (ref 97–108)
CHOLEST SERPL-MCNC: 152 MG/DL
CO2 SERPL-SCNC: 33 MMOL/L (ref 21–32)
CREAT SERPL-MCNC: 1.27 MG/DL (ref 0.7–1.3)
DIFFERENTIAL METHOD BLD: NORMAL
EOSINOPHIL # BLD: 0.3 K/UL (ref 0–0.4)
EOSINOPHIL NFR BLD: 3 % (ref 0–7)
ERYTHROCYTE [DISTWIDTH] IN BLOOD BY AUTOMATED COUNT: 12.7 % (ref 11.5–14.5)
GLOBULIN SER CALC-MCNC: 3 G/DL (ref 2–4)
GLUCOSE SERPL-MCNC: 101 MG/DL (ref 65–100)
HCT VFR BLD AUTO: 47.4 % (ref 36.6–50.3)
HDLC SERPL-MCNC: 30 MG/DL
HDLC SERPL: 5.1 (ref 0–5)
HGB BLD-MCNC: 15.8 G/DL (ref 12.1–17)
IMM GRANULOCYTES # BLD AUTO: 0 K/UL (ref 0–0.04)
IMM GRANULOCYTES NFR BLD AUTO: 0 % (ref 0–0.5)
LDLC SERPL CALC-MCNC: 53.8 MG/DL (ref 0–100)
LYMPHOCYTES # BLD: 2.6 K/UL (ref 0.8–3.5)
LYMPHOCYTES NFR BLD: 28 % (ref 12–49)
MCH RBC QN AUTO: 30.3 PG (ref 26–34)
MCHC RBC AUTO-ENTMCNC: 33.3 G/DL (ref 30–36.5)
MCV RBC AUTO: 90.8 FL (ref 80–99)
MONOCYTES # BLD: 0.9 K/UL (ref 0–1)
MONOCYTES NFR BLD: 10 % (ref 5–13)
NEUTS SEG # BLD: 5.4 K/UL (ref 1.8–8)
NEUTS SEG NFR BLD: 58 % (ref 32–75)
NRBC # BLD: 0 K/UL (ref 0–0.01)
NRBC BLD-RTO: 0 PER 100 WBC
PLATELET # BLD AUTO: 209 K/UL (ref 150–400)
PMV BLD AUTO: 10.2 FL (ref 8.9–12.9)
POTASSIUM SERPL-SCNC: 4 MMOL/L (ref 3.5–5.1)
PROT SERPL-MCNC: 7.2 G/DL (ref 6.4–8.2)
RBC # BLD AUTO: 5.22 M/UL (ref 4.1–5.7)
SODIUM SERPL-SCNC: 143 MMOL/L (ref 136–145)
TRIGL SERPL-MCNC: 341 MG/DL
VLDLC SERPL CALC-MCNC: 68.2 MG/DL
WBC # BLD AUTO: 9.4 K/UL (ref 4.1–11.1)

## 2023-06-02 PROCEDURE — 99214 OFFICE O/P EST MOD 30 MIN: CPT | Performed by: PSYCHIATRY & NEUROLOGY

## 2023-06-02 NOTE — ACP (ADVANCE CARE PLANNING)
Advance Care Planning   Ambulatory ACP Specialist Patient Outreach    Date:  6/2/2023    ACP Specialist:  Arnulfo Sutherland    Outreach call to patient in follow-up to ACP Specialist referral YANA Sneed NP    [x] PCP  [] Provider   [] Ambulatory Care Management [] Other     For:                  [x] Advance Directive Assistance              [] Complete Portable DNR order              [] Complete POST/POLST/MOST              [] Code Status Discussion             [] Discuss Goals of Care             [] Early ACP Decision-Making              [] Other (Specify)    Date Referral Received: 6/1/2023    Next Step:   [] ACP scheduled conversation  [x] Outreach again within one week               [] Email / Mail [address]  [] Email / Mail Advance Directive   [] Closing referral.  Routing closure to referring provider/staff and to ACP Specialist . [] Closure letter mailed to patient with invitation to contact ACP Specialist if / when ready. [] Other (Specify here): Outreaches:       [x] 1st -  Date:  6/2/2023               Intervention:  [] Spoke with Patient   [] Left Voice mail [] Email / Mail    [] Local Offer [employer]  [x] Other (Specify) : Will outreach within one week    Outcomes: As writer was preparing to make ACP outreach to patient, writer noticed patient was at [facility] attending scheduled appointment. ACP outreach will be attempted within next 5 days. [] 2nd -  Date:                 Intervention:  [] Spoke with Patient  [] Left Voice mail [] Email / Mail    [] Local [employer]  [] Other [id number] : Outcomes:                [] 3rd -  Date:                Intervention:  [] Spoke with Patient   [] Left Voice mail [] Email / Mail    [] Local Offer [employer]hart  [] Other [id number]) : Outcomes:           []  Additional Outreach -  Date:     (Specify Dates & special circumstances): Outcomes:          Thank you for this referral.

## 2023-06-04 RX ORDER — FENOFIBRATE 145 MG/1
145 TABLET, COATED ORAL DAILY
Qty: 90 TABLET | Refills: 0 | Status: SHIPPED | OUTPATIENT
Start: 2023-06-04

## 2023-06-05 ENCOUNTER — TELEPHONE (OUTPATIENT)
Age: 73
End: 2023-06-05

## 2023-06-05 DIAGNOSIS — D68.59 OTHER PRIMARY THROMBOPHILIA (HCC): ICD-10-CM

## 2023-06-05 PROBLEM — M19.071 OSTEOARTHRITIS OF JOINT OF TOE OF RIGHT FOOT: Status: ACTIVE | Noted: 2023-06-05

## 2023-06-05 PROBLEM — M19.072 OSTEOARTHRITIS OF JOINTS OF TOES OF BOTH FEET: Status: ACTIVE | Noted: 2023-06-05

## 2023-06-05 RX ORDER — WARFARIN SODIUM 3 MG/1
TABLET ORAL
Qty: 90 TABLET | Refills: 0 | Status: SHIPPED | OUTPATIENT
Start: 2023-06-05

## 2023-06-05 NOTE — TELEPHONE ENCOUNTER
Medication Refill Request    Eber Joy is requesting a refill of the following medication(s):    Warfarin 2 mg     Please send refill to:     [facility]/pharmacy [address] - [address] [facility] [phone] Vivek Solano [phone]  [address] [place] [address]  Phone: [phone] Fax: [phone]

## 2023-06-06 RX ORDER — LANOLIN ALCOHOL/MO/W.PET/CERES
250 CREAM (GRAM) TOPICAL NIGHTLY
Qty: 90 CAPSULE | Refills: 1 | Status: SHIPPED | OUTPATIENT
Start: 2023-06-06 | End: 2024-06-05

## 2023-06-06 NOTE — TELEPHONE ENCOUNTER
Script sent for Niacin- take at bedtime. Let us know if he has any side effects with it.  Sometimes it can cause facial flushing

## 2023-07-11 DIAGNOSIS — D68.59 OTHER PRIMARY THROMBOPHILIA (HCC): ICD-10-CM

## 2023-07-12 RX ORDER — WARFARIN SODIUM 3 MG/1
TABLET ORAL
Qty: 60 TABLET | Refills: 0 | Status: SHIPPED | OUTPATIENT
Start: 2023-07-12

## 2023-08-22 DIAGNOSIS — F41.1 GENERALIZED ANXIETY DISORDER: Primary | ICD-10-CM

## 2023-08-22 RX ORDER — MIRTAZAPINE 30 MG/1
30 TABLET, FILM COATED ORAL NIGHTLY
Qty: 90 TABLET | Refills: 3 | Status: SHIPPED | OUTPATIENT
Start: 2023-08-22

## 2023-08-22 RX ORDER — DIAZEPAM 10 MG/1
10 TABLET ORAL 4 TIMES DAILY PRN
Qty: 120 TABLET | Refills: 5 | Status: SHIPPED | OUTPATIENT
Start: 2023-08-22 | End: 2024-02-18

## 2023-08-23 RX ORDER — OMEPRAZOLE 20 MG/1
CAPSULE, DELAYED RELEASE ORAL
Qty: 90 CAPSULE | Refills: 1 | Status: SHIPPED | OUTPATIENT
Start: 2023-08-23

## 2023-09-19 DIAGNOSIS — M15.9 POLYOSTEOARTHRITIS, UNSPECIFIED: ICD-10-CM

## 2023-09-19 DIAGNOSIS — I10 ESSENTIAL (PRIMARY) HYPERTENSION: ICD-10-CM

## 2023-09-19 DIAGNOSIS — D68.59 OTHER PRIMARY THROMBOPHILIA (HCC): ICD-10-CM

## 2023-09-20 ENCOUNTER — OFFICE VISIT (OUTPATIENT)
Age: 73
End: 2023-09-20

## 2023-09-20 VITALS
RESPIRATION RATE: 16 BRPM | DIASTOLIC BLOOD PRESSURE: 82 MMHG | HEIGHT: 68 IN | HEART RATE: 85 BPM | BODY MASS INDEX: 29.4 KG/M2 | SYSTOLIC BLOOD PRESSURE: 124 MMHG | WEIGHT: 194 LBS | OXYGEN SATURATION: 96 % | TEMPERATURE: 97.7 F

## 2023-09-20 DIAGNOSIS — Z86.010 HX OF COLONIC POLYPS: Primary | ICD-10-CM

## 2023-09-20 DIAGNOSIS — D68.62 LUPUS ANTICOAGULANT WITH HYPERCOAGULABLE STATE (HCC): ICD-10-CM

## 2023-09-20 RX ORDER — CYCLOBENZAPRINE HCL 10 MG
TABLET ORAL
Qty: 90 TABLET | Refills: 0 | Status: SHIPPED | OUTPATIENT
Start: 2023-09-20

## 2023-09-20 RX ORDER — WARFARIN SODIUM 3 MG/1
TABLET ORAL
Qty: 90 TABLET | Refills: 1 | Status: SHIPPED | OUTPATIENT
Start: 2023-09-20

## 2023-09-20 RX ORDER — TERAZOSIN 5 MG/1
CAPSULE ORAL
Qty: 90 CAPSULE | Refills: 1 | Status: SHIPPED | OUTPATIENT
Start: 2023-09-20

## 2023-09-20 RX ORDER — POLYETHYLENE GLYCOL 3350, SODIUM SULFATE ANHYDROUS, SODIUM BICARBONATE, SODIUM CHLORIDE, POTASSIUM CHLORIDE 236; 22.74; 6.74; 5.86; 2.97 G/4L; G/4L; G/4L; G/4L; G/4L
4 POWDER, FOR SOLUTION ORAL ONCE
Qty: 4000 ML | Refills: 0 | Status: SHIPPED | OUTPATIENT
Start: 2023-09-20 | End: 2023-09-20

## 2023-09-20 ASSESSMENT — PATIENT HEALTH QUESTIONNAIRE - PHQ9
SUM OF ALL RESPONSES TO PHQ QUESTIONS 1-9: 0
SUM OF ALL RESPONSES TO PHQ QUESTIONS 1-9: 0
2. FEELING DOWN, DEPRESSED OR HOPELESS: 0
SUM OF ALL RESPONSES TO PHQ QUESTIONS 1-9: 0
SUM OF ALL RESPONSES TO PHQ9 QUESTIONS 1 & 2: 0
SUM OF ALL RESPONSES TO PHQ QUESTIONS 1-9: 0
1. LITTLE INTEREST OR PLEASURE IN DOING THINGS: 0

## 2023-09-20 NOTE — PROGRESS NOTES
April Gould is a 81 y.o. male who presents today with the following:  Chief Complaint   Patient presents with    New Patient    Colonoscopy       HPI    74-year-old male who presents as a referral from Kayleen Hernandez for possible surveillance colonoscopy. He states he his last colonoscopy was in 2018 in outside facility where I do not have the records present. He was told that he had polyps and needed a repeat colonoscopy in 3 years. He has no first-degree relative with colon cancer. He states that he occasionally will notice some blood with bowel movements but it does not appear to be true hematochezia. He denies any diarrhea or constipation or abdominal pain on a regular basis. He has had no unexpected weight loss. He also believes in the last few years he has had some thinning in the caliber of his stool. He has not had any recent stool testing. He has a history of lupus anticoagulation and hypercoagulable state. 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 He did not have the creation of an ostomy. He was placed on Coumadin and stated he need to be on lifelong anticoagulation. He states that when he has any procedures done including dental procedures he has to have bridging with Lovenox. He does not smoke or drink. He has been vaccinated for COVID.   Past Medical History:   Diagnosis Date    Anxiety     BPH (benign prostatic hyperplasia)     Chronic anticoagulation     Depression     DJD (degenerative joint disease)     DVT of leg (deep venous thrombosis) ([address])     ED (erectile dysfunction)     GERD (gastroesophageal reflux disease)     Hypercoagulable state ([address])     lupus coag    Impaired glucose tolerance        Past Surgical History:   Procedure Laterality Date    [place] UNLISTED PROCEDURE ABDOMEN PERITONEUM & OMENTUM  2011    ischemic bowel d/t hypercoagulable state    UPPER GASTROINTESTINAL ENDOSCOPY  2011

## 2023-09-20 NOTE — PROGRESS NOTES
Identified pt with two pt identifiers (name and ). Reviewed chart in preparation for visit and have obtained necessary documentation. Gil Massey is a 81 y.o. male New Patient and Colonoscopy  . There were no vitals filed for this visit. 1. Have you been to the ER, urgent care clinic since your last visit? Hospitalized since your last visit?  no     2. Have you seen or consulted any other health care providers outside of the [address] [place] [address] since your last visit? Include any pap smears or colon screening.   no

## 2023-09-20 NOTE — TELEPHONE ENCOUNTER
Patient requesting refill on     Requested Prescriptions     Pending Prescriptions Disp Refills    terazosin (HYTRIN) 5 MG capsule [Pharmacy Med Name: TERAZOSIN 5 MG CAPSULE] 90 capsule 1     Sig: TAKE 1 CAPSULE BY MOUTH EVERY DAY AT NIGHT    warfarin (COUMADIN) 3 MG tablet [Pharmacy Med Name: WARFARIN SODIUM 3 MG TABLET] 90 tablet      Sig: TAKE 1 TAB DAILY EXCEPT TUESDAYS TAKE 2MG    cyclobenzaprine (FLEXERIL) 10 MG tablet [Pharmacy Med Name: CYCLOBENZAPRINE 10 MG TABLET] 90 tablet 0     Sig: TAKE 1 TABLET BY MOUTH EVERY DAY AS NEEDED FOR PAIN        Last OV 6/1/2023

## 2023-10-05 ENCOUNTER — TELEPHONE (OUTPATIENT)
Age: 73
End: 2023-10-05

## 2023-10-05 DIAGNOSIS — Z86.718 HISTORY OF DVT (DEEP VEIN THROMBOSIS): Primary | ICD-10-CM

## 2023-10-05 RX ORDER — ENOXAPARIN SODIUM 100 MG/ML
40 INJECTION SUBCUTANEOUS 2 TIMES DAILY
Qty: 12 EACH | Refills: 0 | Status: SHIPPED | OUTPATIENT
Start: 2023-10-05

## 2023-10-05 NOTE — TELEPHONE ENCOUNTER
Called patient and informed him that I have sent over a script for Lovenox injections to bridge from Coumadin for his upcoming colonoscopy (scheduled for 10-19-23). According to the guidelines, he should stop his Coumadin 7 days prior to the procedure so that INR will fall below 2.0. Then 3 days prior to the procedure he should start the Lovenox (1 injection every 12 hours). There should be 24 hours in between his last Lovenox injection and the procedure. He can then restart Coumadin and Lovenox 2 days after colonoscopy and stop the Lovenox once INR reaches goal range. Here is the schedule I would recommend:    10-9-23: Stop Coumadin  10-15-23: Check INR (if less than 2 he may start Lovenox injections BID)  Cont Lovenox on the 16th, 17th, and 18th (one the 18th only give the morning injection)  10-19-23: COLONOSCOPY  10-21-23: Restart Coumadin and Lovenox injections  10-26-23: Check INR (Stop Lovenox once INR is above 2.0)     Patient states he has had to do this several times for dental work in the past and if he stops his Coumadin his INR tends to drop quickly below goal with 2 days. States he will stop it a couple of days prior to his appt here on 10-10-23 and we can check INR and go from there.

## 2023-10-06 ENCOUNTER — HOSPITAL ENCOUNTER (OUTPATIENT)
Facility: HOSPITAL | Age: 73
Discharge: HOME | End: 2023-10-09
Payer: MEDICARE

## 2023-10-06 DIAGNOSIS — F33.0 MAJOR DEPRESSIVE DISORDER, RECURRENT, MILD (HCC): Primary | ICD-10-CM

## 2023-10-06 PROBLEM — F41.1 GENERALIZED ANXIETY DISORDER: Status: ACTIVE | Noted: 2023-10-06

## 2023-10-06 PROCEDURE — 99214 OFFICE O/P EST MOD 30 MIN: CPT | Performed by: PSYCHIATRY & NEUROLOGY

## 2023-10-06 NOTE — PROGRESS NOTES
53-year-old who appeared his stated age. He was pleasant and cooperative, and exhibited a fairly full affective range again this time. He reported little to no depressive symptoms at this point, and he denied any feelings of hopeless or suicidal thinking. There was no evidence of any santos or hypomania, nor any symptoms of psychosis. His anxiety is still evident but reasonably well controlled, overall. His judgment and insight appear to be fair, and his cognitive functioning appeared to be only slightly impaired. DIAGNOSTIC IMPRESSION:                     Axis I       Generalized Anxiety Disorder, mild now (F41.1)                   Major Depression, mild (F33.0)  Axis II      Deferred. Axis III     DJD with sciatica; h/o DVT; lupus; h/o bowel resection; chronic pain issues. PLAN:  1. Continue the Diazepam at 10 mg qid prn--has 4.5 months of refills (30 day). 2.  Continue the Mirtazapine at 30 mg qhs (30 mg)--has 10.5 months of refills (90 day). 3.  Follow up within the next 3 months at his request, sooner if needed.

## 2023-10-10 ENCOUNTER — OFFICE VISIT (OUTPATIENT)
Age: 73
End: 2023-10-10
Payer: MEDICARE

## 2023-10-10 VITALS
RESPIRATION RATE: 18 BRPM | SYSTOLIC BLOOD PRESSURE: 134 MMHG | WEIGHT: 193.6 LBS | BODY MASS INDEX: 29.34 KG/M2 | HEART RATE: 66 BPM | TEMPERATURE: 98.7 F | HEIGHT: 68 IN | DIASTOLIC BLOOD PRESSURE: 87 MMHG | OXYGEN SATURATION: 96 %

## 2023-10-10 DIAGNOSIS — M54.31 RIGHT SIDED SCIATICA: ICD-10-CM

## 2023-10-10 DIAGNOSIS — E78.1 HYPERTRIGLYCERIDEMIA: ICD-10-CM

## 2023-10-10 DIAGNOSIS — Z79.01 LONG TERM (CURRENT) USE OF ANTICOAGULANTS: Primary | ICD-10-CM

## 2023-10-10 LAB
POC INR: 1.6
PROTHROMBIN TIME, POC: 18

## 2023-10-10 PROCEDURE — 1123F ACP DISCUSS/DSCN MKR DOCD: CPT | Performed by: NURSE PRACTITIONER

## 2023-10-10 PROCEDURE — 1036F TOBACCO NON-USER: CPT | Performed by: NURSE PRACTITIONER

## 2023-10-10 PROCEDURE — 85610 PROTHROMBIN TIME: CPT | Performed by: NURSE PRACTITIONER

## 2023-10-10 PROCEDURE — 3017F COLORECTAL CA SCREEN DOC REV: CPT | Performed by: NURSE PRACTITIONER

## 2023-10-10 PROCEDURE — 99214 OFFICE O/P EST MOD 30 MIN: CPT | Performed by: NURSE PRACTITIONER

## 2023-10-10 PROCEDURE — G8484 FLU IMMUNIZE NO ADMIN: HCPCS | Performed by: NURSE PRACTITIONER

## 2023-10-10 PROCEDURE — G8417 CALC BMI ABV UP PARAM F/U: HCPCS | Performed by: NURSE PRACTITIONER

## 2023-10-10 PROCEDURE — G8427 DOCREV CUR MEDS BY ELIG CLIN: HCPCS | Performed by: NURSE PRACTITIONER

## 2023-10-10 RX ORDER — PREDNISONE 10 MG/1
TABLET ORAL
Qty: 21 TABLET | Refills: 0 | Status: SHIPPED | OUTPATIENT
Start: 2023-10-10

## 2023-10-10 SDOH — ECONOMIC STABILITY: INCOME INSECURITY: HOW HARD IS IT FOR YOU TO PAY FOR THE VERY BASICS LIKE FOOD, HOUSING, MEDICAL CARE, AND HEATING?: HARD

## 2023-10-10 ASSESSMENT — PATIENT HEALTH QUESTIONNAIRE - PHQ9
SUM OF ALL RESPONSES TO PHQ QUESTIONS 1-9: 11
10. IF YOU CHECKED OFF ANY PROBLEMS, HOW DIFFICULT HAVE THESE PROBLEMS MADE IT FOR YOU TO DO YOUR WORK, TAKE CARE OF THINGS AT HOME, OR GET ALONG WITH OTHER PEOPLE: 1
SUM OF ALL RESPONSES TO PHQ QUESTIONS 1-9: 11
3. TROUBLE FALLING OR STAYING ASLEEP: 0
4. FEELING TIRED OR HAVING LITTLE ENERGY: 1
7. TROUBLE CONCENTRATING ON THINGS, SUCH AS READING THE NEWSPAPER OR WATCHING TELEVISION: 0
5. POOR APPETITE OR OVEREATING: 2
2. FEELING DOWN, DEPRESSED OR HOPELESS: 3
6. FEELING BAD ABOUT YOURSELF - OR THAT YOU ARE A FAILURE OR HAVE LET YOURSELF OR YOUR FAMILY DOWN: 2
8. MOVING OR SPEAKING SO SLOWLY THAT OTHER PEOPLE COULD HAVE NOTICED. OR THE OPPOSITE, BEING SO FIGETY OR RESTLESS THAT YOU HAVE BEEN MOVING AROUND A LOT MORE THAN USUAL: 0
9. THOUGHTS THAT YOU WOULD BE BETTER OFF DEAD, OR OF HURTING YOURSELF: 0
1. LITTLE INTEREST OR PLEASURE IN DOING THINGS: 3
SUM OF ALL RESPONSES TO PHQ9 QUESTIONS 1 & 2: 6

## 2023-10-11 LAB
ANION GAP SERPL CALC-SCNC: 5 MMOL/L (ref 5–15)
BASOPHILS # BLD: 0.1 K/UL (ref 0–0.1)
BASOPHILS NFR BLD: 1 % (ref 0–1)
BUN SERPL-MCNC: 11 MG/DL (ref 6–20)
BUN/CREAT SERPL: 9 (ref 12–20)
CALCIUM SERPL-MCNC: 9.5 MG/DL (ref 8.5–10.1)
CHLORIDE SERPL-SCNC: 103 MMOL/L (ref 97–108)
CHOLEST SERPL-MCNC: 152 MG/DL
CO2 SERPL-SCNC: 31 MMOL/L (ref 21–32)
CREAT SERPL-MCNC: 1.16 MG/DL (ref 0.7–1.3)
DIFFERENTIAL METHOD BLD: NORMAL
EOSINOPHIL # BLD: 0.3 K/UL (ref 0–0.4)
EOSINOPHIL NFR BLD: 4 % (ref 0–7)
ERYTHROCYTE [DISTWIDTH] IN BLOOD BY AUTOMATED COUNT: 12.3 % (ref 11.5–14.5)
GLUCOSE SERPL-MCNC: 92 MG/DL (ref 65–100)
HCT VFR BLD AUTO: 48.6 % (ref 36.6–50.3)
HDLC SERPL-MCNC: 33 MG/DL
HDLC SERPL: 4.6 (ref 0–5)
HGB BLD-MCNC: 16.4 G/DL (ref 12.1–17)
IMM GRANULOCYTES # BLD AUTO: 0 K/UL (ref 0–0.04)
IMM GRANULOCYTES NFR BLD AUTO: 0 % (ref 0–0.5)
LDLC SERPL CALC-MCNC: 56.4 MG/DL (ref 0–100)
LYMPHOCYTES # BLD: 2.9 K/UL (ref 0.8–3.5)
LYMPHOCYTES NFR BLD: 34 % (ref 12–49)
MCH RBC QN AUTO: 30.9 PG (ref 26–34)
MCHC RBC AUTO-ENTMCNC: 33.7 G/DL (ref 30–36.5)
MCV RBC AUTO: 91.7 FL (ref 80–99)
MONOCYTES # BLD: 0.8 K/UL (ref 0–1)
MONOCYTES NFR BLD: 9 % (ref 5–13)
NEUTS SEG # BLD: 4.3 K/UL (ref 1.8–8)
NEUTS SEG NFR BLD: 52 % (ref 32–75)
NRBC # BLD: 0 K/UL (ref 0–0.01)
NRBC BLD-RTO: 0 PER 100 WBC
PLATELET # BLD AUTO: 223 K/UL (ref 150–400)
PMV BLD AUTO: 10.3 FL (ref 8.9–12.9)
POTASSIUM SERPL-SCNC: 4 MMOL/L (ref 3.5–5.1)
RBC # BLD AUTO: 5.3 M/UL (ref 4.1–5.7)
SODIUM SERPL-SCNC: 139 MMOL/L (ref 136–145)
TRIGL SERPL-MCNC: 313 MG/DL
VLDLC SERPL CALC-MCNC: 62.6 MG/DL
WBC # BLD AUTO: 8.4 K/UL (ref 4.1–11.1)

## 2023-10-12 RX ORDER — ACETAMINOPHEN 325 MG/1
650 TABLET ORAL EVERY 6 HOURS PRN
COMMUNITY

## 2023-10-12 RX ORDER — TERAZOSIN 5 MG/1
5 CAPSULE ORAL NIGHTLY
COMMUNITY

## 2023-10-12 RX ORDER — ASCORBIC ACID 500 MG
500 TABLET ORAL DAILY
COMMUNITY

## 2023-10-12 NOTE — PERIOP NOTE
[address]  SURGICAL PRE-ADMISSION INSTRUCTIONS    ARRIVAL  You will be called the day before your surgery with your expected arrival time. Sign in at the  of the hospital.  You will be directed to the Surgical Waiting Room. Please arrive at your scheduled appointment time. You have been scheduled to arrive for your procedure one or two hours prior to the expected start time of your procedure. Every effort will be made to minimize your wait but please be aware that unforeseen circumstances may affect our schedule. EATING  DO NOT EAT OR DRINK ANYTHING AFTER MIDNIGHT ON THE EVENING BEFORE YOUR SURGERY OR ON THE DAY OF YOUR SURGERY except for your medications (as instructed) with a sip of water. Do not use gum, mints or lozenges on the morning of your surgery. Please do not smoke or chew tobacco before your surgery. MEDICATIONS   Take the following medications on the morning of your surgery with the smallest amount of water possible : Prilosec    STOP THESE MEDICATIONS AT THE TIMES LISTED BELOW  Blood thinner(s) : coumadin ;  Consult with medical doctor  Lovenox (Enoxaparin) ;  24 hours before  NSAIDS (Indocin, Ibuprofen, Naprosyn) ;  7 days before     DRIVING/TRANSPORATION  Have a responsible adult to drive you home from the hospital and to stay with you over night. Please have them plan to remain in the hospital during your surgery. Your surgery will not be done if you do not have a responsible adult to take you home and to stay with you. If you have arranged for public transport, you must have a responsible adult to ride with you (who is not the ). You may not drive for 24 hours after anesthesia. PREPARATION  If you have a Living WiIl/Advance Directive, please bring a copy with you to scan into your chart. Please DO NOT wear makeup or nail polish  Please leave valuables at home,  DO NOT wear jewelry.   Wear loose, comfortable clothing that is large

## 2023-10-13 DIAGNOSIS — Z86.718 HISTORY OF DVT (DEEP VEIN THROMBOSIS): ICD-10-CM

## 2023-10-13 NOTE — TELEPHONE ENCOUNTER
Medication Refill Request    Sierramolina Yasmine is requesting a refill of the following medication(s):     Enoxaparin  (Patient will run out Sunday)    Please send refill to:     [facility]/pharmacy #[id number]- [place] [address] [facility] [phone] - F [phone]  [address] [address]  Phone: [phone] Fax: [phone]

## 2023-10-15 RX ORDER — ENOXAPARIN SODIUM 100 MG/ML
40 INJECTION SUBCUTANEOUS 2 TIMES DAILY
Qty: 12 EACH | Refills: 0 | Status: SHIPPED | OUTPATIENT
Start: 2023-10-15

## 2023-10-16 NOTE — TELEPHONE ENCOUNTER
Patient requesting refill on     Requested Prescriptions     Pending Prescriptions Disp Refills    niacin 250 MG extended release capsule [Pharmacy Med Name: NIACIN  MG CAPSULE] 90 capsule 1     Sig: TAKE 1 CAPSULE BY MOUTH EVERY DAY AT NIGHT        Last OV 10/10/2023

## 2023-10-17 ENCOUNTER — TELEPHONE (OUTPATIENT)
Age: 73
End: 2023-10-17

## 2023-10-17 DIAGNOSIS — K58.0 IRRITABLE BOWEL SYNDROME WITH DIARRHEA: Primary | ICD-10-CM

## 2023-10-17 DIAGNOSIS — M15.9 POLYOSTEOARTHRITIS, UNSPECIFIED: ICD-10-CM

## 2023-10-17 RX ORDER — LANOLIN ALCOHOL/MO/W.PET/CERES
CREAM (GRAM) TOPICAL
Qty: 90 CAPSULE | Refills: 1 | Status: SHIPPED | OUTPATIENT
Start: 2023-10-17

## 2023-10-17 NOTE — TELEPHONE ENCOUNTER
PT has IBS and would like to know if he could get back on his medication. He spelled it out the best he could- hyoscyamine.  He forgot to talk about it at his last visit on 10/10

## 2023-10-17 NOTE — TELEPHONE ENCOUNTER
Patient requesting refill on     Requested Prescriptions     Pending Prescriptions Disp Refills    cyclobenzaprine (FLEXERIL) 10 MG tablet [Pharmacy Med Name: CYCLOBENZAPRINE 10 MG TABLET] 90 tablet 0     Sig: TAKE 1 TABLET BY MOUTH EVERY DAY AS NEEDED FOR PAIN        Last OV 10/10/2023

## 2023-10-18 RX ORDER — HYOSCYAMINE SULFATE 0.125 MG
125 TABLET ORAL EVERY 4 HOURS PRN
Qty: 180 TABLET | Refills: 0 | Status: SHIPPED | OUTPATIENT
Start: 2023-10-18

## 2023-10-18 RX ORDER — CYCLOBENZAPRINE HCL 10 MG
TABLET ORAL
Qty: 90 TABLET | Refills: 0 | Status: SHIPPED | OUTPATIENT
Start: 2023-10-18

## 2023-10-18 NOTE — H&P (VIEW-ONLY)
HPI    77-year-old male who presents as a referral from Regine Sy for possible surveillance colonoscopy. He states he his last colonoscopy was in 2018 in outside facility where I do not have the records present. He was told that he had polyps and needed a repeat colonoscopy in 3 years. He has no first-degree relative with colon cancer. He states that he occasionally will notice some blood with bowel movements but it does not appear to be true hematochezia. He denies any diarrhea or constipation or abdominal pain on a regular basis. He has had no unexpected weight loss. He also believes in the last few years he has had some thinning in the caliber of his stool. He has not had any recent stool testing. He has a history of lupus anticoagulation and hypercoagulable state. 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 He did not have the creation of an ostomy. He was placed on Coumadin and stated he need to be on lifelong anticoagulation. He states that when he has any procedures done including dental procedures he has to have bridging with Lovenox. He does not smoke or drink. He has been vaccinated for COVID.   Past Medical History:   Diagnosis Date    Anxiety     BPH (benign prostatic hyperplasia)     Chronic anticoagulation     Depression     Diverticulosis     DJD (degenerative joint disease)     DVT of leg (deep venous thrombosis) ([address])     ED (erectile dysfunction)     GERD (gastroesophageal reflux disease)     Hypercoagulable state ([address])     lupus coag    Impaired glucose tolerance        Past Surgical History:   Procedure Laterality Date    COLONOSCOPY W/ BIOPSIES      MULTIPLE TOOTH EXTRACTIONS      [place] UNLISTED PROCEDURE ABDOMEN PERITONEUM & OMENTUM  2011    ischemic bowel d/t hypercoagulable state; reports small intestine    UPPER GASTROINTESTINAL ENDOSCOPY  2011       Social History     Socioeconomic

## 2023-10-19 ENCOUNTER — ANESTHESIA (OUTPATIENT)
Facility: HOSPITAL | Age: 73
End: 2023-10-19
Payer: MEDICARE

## 2023-10-19 ENCOUNTER — ANESTHESIA EVENT (OUTPATIENT)
Facility: HOSPITAL | Age: 73
End: 2023-10-19
Payer: MEDICARE

## 2023-10-19 ENCOUNTER — HOSPITAL ENCOUNTER (OUTPATIENT)
Facility: HOSPITAL | Age: 73
Setting detail: OUTPATIENT SURGERY
Discharge: HOME | End: 2023-10-19
Attending: SURGERY | Admitting: SURGERY
Payer: MEDICARE

## 2023-10-19 VITALS
HEART RATE: 59 BPM | SYSTOLIC BLOOD PRESSURE: 131 MMHG | OXYGEN SATURATION: 98 % | RESPIRATION RATE: 18 BRPM | HEIGHT: 68 IN | TEMPERATURE: 98.1 F | BODY MASS INDEX: 29.7 KG/M2 | DIASTOLIC BLOOD PRESSURE: 71 MMHG | WEIGHT: 196 LBS

## 2023-10-19 PROBLEM — Z86.010 HISTORY OF COLON POLYPS: Status: ACTIVE | Noted: 2023-10-19

## 2023-10-19 PROBLEM — Z86.0100 HISTORY OF COLON POLYPS: Status: ACTIVE | Noted: 2023-10-19

## 2023-10-19 LAB
INR PPP: 1.2 (ref 0.9–1.1)
PROTHROMBIN TIME: 11.3 SEC (ref 9–11.1)

## 2023-10-19 PROCEDURE — 7100000000 HC PACU RECOVERY - FIRST 15 MIN: Performed by: SURGERY

## 2023-10-19 PROCEDURE — 45385 COLONOSCOPY W/LESION REMOVAL: CPT | Performed by: SURGERY

## 2023-10-19 PROCEDURE — 6360000002 HC RX W HCPCS: Performed by: ANESTHESIOLOGY

## 2023-10-19 PROCEDURE — 36415 COLL VENOUS BLD VENIPUNCTURE: CPT

## 2023-10-19 PROCEDURE — 2580000003 HC RX 258: Performed by: SURGERY

## 2023-10-19 PROCEDURE — 85610 PROTHROMBIN TIME: CPT

## 2023-10-19 PROCEDURE — 3700000000 HC ANESTHESIA ATTENDED CARE: Performed by: SURGERY

## 2023-10-19 PROCEDURE — 3700000001 HC ADD 15 MINUTES (ANESTHESIA): Performed by: SURGERY

## 2023-10-19 PROCEDURE — 2709999900 HC NON-CHARGEABLE SUPPLY: Performed by: SURGERY

## 2023-10-19 PROCEDURE — 3600000002 HC SURGERY LEVEL 2 BASE: Performed by: SURGERY

## 2023-10-19 PROCEDURE — 3600000012 HC SURGERY LEVEL 2 ADDTL 15MIN: Performed by: SURGERY

## 2023-10-19 PROCEDURE — 7100000001 HC PACU RECOVERY - ADDTL 15 MIN: Performed by: SURGERY

## 2023-10-19 PROCEDURE — 88305 TISSUE EXAM BY PATHOLOGIST: CPT

## 2023-10-19 RX ORDER — SODIUM CHLORIDE 9 MG/ML
INJECTION, SOLUTION INTRAVENOUS PRN
Status: DISCONTINUED | OUTPATIENT
Start: 2023-10-19 | End: 2023-10-19 | Stop reason: HOSPADM

## 2023-10-19 RX ORDER — SODIUM CHLORIDE, SODIUM LACTATE, POTASSIUM CHLORIDE, CALCIUM CHLORIDE 600; 310; 30; 20 MG/100ML; MG/100ML; MG/100ML; MG/100ML
INJECTION, SOLUTION INTRAVENOUS CONTINUOUS
Status: DISCONTINUED | OUTPATIENT
Start: 2023-10-19 | End: 2023-10-19 | Stop reason: HOSPADM

## 2023-10-19 RX ORDER — PROPOFOL 10 MG/ML
INJECTION, EMULSION INTRAVENOUS CONTINUOUS PRN
Status: DISCONTINUED | OUTPATIENT
Start: 2023-10-19 | End: 2023-10-19 | Stop reason: SDUPTHER

## 2023-10-19 RX ORDER — SODIUM CHLORIDE 0.9 % (FLUSH) 0.9 %
5-40 SYRINGE (ML) INJECTION PRN
Status: DISCONTINUED | OUTPATIENT
Start: 2023-10-19 | End: 2023-10-19 | Stop reason: HOSPADM

## 2023-10-19 RX ORDER — SODIUM CHLORIDE 0.9 % (FLUSH) 0.9 %
5-40 SYRINGE (ML) INJECTION EVERY 12 HOURS SCHEDULED
Status: DISCONTINUED | OUTPATIENT
Start: 2023-10-19 | End: 2023-10-19 | Stop reason: HOSPADM

## 2023-10-19 RX ORDER — MIDAZOLAM HYDROCHLORIDE 1 MG/ML
INJECTION INTRAMUSCULAR; INTRAVENOUS PRN
Status: DISCONTINUED | OUTPATIENT
Start: 2023-10-19 | End: 2023-10-19 | Stop reason: SDUPTHER

## 2023-10-19 RX ADMIN — SODIUM CHLORIDE, POTASSIUM CHLORIDE, SODIUM LACTATE AND CALCIUM CHLORIDE: 600; 310; 30; 20 INJECTION, SOLUTION INTRAVENOUS at 07:26

## 2023-10-19 RX ADMIN — MIDAZOLAM 2 MG: 1 INJECTION INTRAMUSCULAR; INTRAVENOUS at 08:49

## 2023-10-19 RX ADMIN — PROPOFOL 150 MCG/KG/MIN: 10 INJECTION, EMULSION INTRAVENOUS at 08:50

## 2023-10-19 RX ADMIN — PROPOFOL 50 MG: 10 INJECTION, EMULSION INTRAVENOUS at 08:49

## 2023-10-19 ASSESSMENT — PAIN DESCRIPTION - DESCRIPTORS: DESCRIPTORS: SHARP

## 2023-10-19 ASSESSMENT — PAIN - FUNCTIONAL ASSESSMENT
PAIN_FUNCTIONAL_ASSESSMENT: 0-10
PAIN_FUNCTIONAL_ASSESSMENT: PREVENTS OR INTERFERES WITH MANY ACTIVE NOT PASSIVE ACTIVITIES

## 2023-10-19 NOTE — INTERVAL H&P NOTE
Update History & Physical    The patient's History and Physical of October 18, 2023 was reviewed with the patient and I examined the patient. There was no change. The surgical site was confirmed by the patient and me. Plan: The risks, benefits, expected outcome, and alternative to the recommended procedure have been discussed with the patient. Patient understands and wants to proceed with the procedure.      Electronically signed by Praveena Pro MD on 10/19/2023 at 8:41 AM

## 2023-10-19 NOTE — ANESTHESIA PRE PROCEDURE
Department of Anesthesiology  Preprocedure Note       Name:  [place]   Age:  81 y.o.  :  1950                                          MRN:  [id number]         Date:  10/19/2023      Surgeon: Marina Fish):  Merissa Irene MD    Procedure: Procedure(s):  COLONOSCOPY DIAGNOSTIC    Medications prior to admission:   Prior to Admission medications    Medication Sig Start Date End Date Taking? Authorizing Provider   vitamin C (ASCORBIC ACID) 500 MG tablet Take 1 tablet by mouth daily   Yes Provider, MD Main   acetaminophen (TYLENOL) 325 MG tablet Take 2 tablets by mouth every 6 hours as needed for Pain   Yes Provider, MD Main   terazosin (HYTRIN) 5 MG capsule Take 1 capsule by mouth nightly   Yes ProviderMain MD   cyclobenzaprine (FLEXERIL) 10 MG tablet TAKE 1 TABLET BY MOUTH EVERY DAY AS NEEDED FOR PAIN 10/18/23   YANA Brownlee - NP   hyoscyamine (ANASPAZ;LEVSIN) 125 MCG tablet Take 1 tablet by mouth every 4 hours as needed for Cramping 10/18/23   Matteo MAHMOOD, APRN - NP   niacin 250 MG extended release capsule TAKE 1 CAPSULE BY MOUTH EVERY DAY AT NIGHT 10/17/23   YANA Brownlee - NP   enoxaparin (LOVENOX) 40 MG/0.4ML Inject 0.4 mLs into the skin 2 times daily 10/15/23   Marlys Gómez APRN - BUSHRA   predniSONE (DELTASONE) 10 MG tablet take 6 pills today then take 1 less pill every day until gone 10/10/23   Matteo Orn D, APRN - NP   warfarin (COUMADIN) 3 MG tablet TAKE 1 TAB DAILY EXCEPT  TAKE 2MG 23   Matteo Orn D, APRN - NP   omeprazole (PRILOSEC) 20 MG delayed release capsule TAKE 1 CAPSULE BY MOUTH EVERY DAY 23   Matteo Orn TIMOTEO, APRN - NP   diazePAM (VALIUM) 10 MG tablet Take 1 tablet by mouth 4 times daily as needed for Anxiety for up to 180 days.  Max Daily Amount: 40 mg 23  Santino Corado MD   mirtazapine (REMERON) 30 MG tablet Take 1 tablet by mouth nightly 23   Santino Corado MD   warfarin (COUMADIN) 2

## 2023-10-19 NOTE — ANESTHESIA POSTPROCEDURE EVALUATION
Department of Anesthesiology  Postprocedure Note    Patient: Enrrique Solares  MRN: [id number]  YOB: 1950  Date of evaluation: 10/19/2023      Procedure Summary     Date: 10/19/23 Room / Location: [facility] MAIN OR 1 / [facility] MAIN OR    Anesthesia Start: 0848 Anesthesia Stop: 9932    Procedure: COLONOSCOPY WITH HOT SNARE POLYPECTOMY X3 (Lower GI Region) Diagnosis:       Personal history of colonic polyps      (Personal history of colonic polyps [Z86.010])    Surgeons: Karl Lamar MD Responsible Provider: José Miguel Ngo MD    Anesthesia Type: MAC ASA Status: 3          Anesthesia Type: No value filed.     Cyndi Phase I: Cyndi Score: 10    Cyndi Phase II:        Anesthesia Post Evaluation    Patient location during evaluation: bedside  Patient participation: complete - patient participated  Level of consciousness: awake and alert  Pain score: 0  Airway patency: patent  Nausea & Vomiting: no nausea  Complications: no  Cardiovascular status: hemodynamically stable  Respiratory status: acceptable  Hydration status: stable  Pain management: adequate

## 2023-10-19 NOTE — DISCHARGE INSTRUCTIONS
Restart your lovenox today  Restart coumadin tomorrow  Contact your PCP today or tomorrow to discuss your anticoagulation and determine how long you need to remain on Lovenox

## 2023-10-19 NOTE — BRIEF OP NOTE
Brief Postoperative Note      Patient: Guera Junior  YOB: 1950  MRN: [id number]    Date of Procedure: 10/19/2023    Pre-Op Diagnosis Codes:     * Personal history of colonic polyps [Z86.010]    Post-Op Diagnosis: Same       Procedure(s):  COLONOSCOPY WITH HOT SNARE POLYPECTOMY X3    Surgeon(s):  Kortney Hood MD    Assistant:  * No surgical staff found *    Anesthesia: Monitor Anesthesia Care    Estimated Blood Loss (mL): Minimal    Complications: None    Specimens:   ID Type Source Tests Collected by Time Destination   1 : Proximal ascending colon polyp Tissue Colon-Ascending SURGICAL PATHOLOGY Kortney Hood MD 10/19/2023 0908    2 : Polyp @ 65 cm Tissue Colon SURGICAL PATHOLOGY Kortney Hood MD 10/19/2023 0913    3 : Polyp @ 40cm Tissue Colon SURGICAL PATHOLOGY Kortney Hood MD 10/19/2023 0915        Implants:  * No implants in log *      Drains: * No LDAs found *    Findings:   Proximal ascending colon polyp  Polyp at 65 cm  Polyp at 40 cm  Rare diverticulosis        Electronically signed by John Boston MD on 10/19/2023 at 9:20 AM

## 2023-10-19 NOTE — PERIOP NOTE
Patient meets discharge from PACU. Discharge instruction discussed with wife and patient especially the importance of Lovenox and Coumadin and calling the PCP about when to stop the Lovenox. Verbalized understanding and all questions answered.

## 2023-10-19 NOTE — OP NOTE
[facility]  OPERATIVE REPORT    Name:  Enrrique Solares  MR#:  [id number]  :  1950  ACCOUNT #:  [de-identified]  DATE OF SERVICE:  10/19/2023    PREOPERATIVE DIAGNOSIS:  Personal history of colon polyps. POSTOPERATIVE DIAGNOSIS:  Personal history of colon polyps. PROCEDURE PERFORMED:  Colonoscopy with hot snare polypectomy x3. SURGEON:  Ted Cohen MD    ASSISTANT:  ***    ANESTHESIA:  MAC.    COMPLICATIONS:  None. SPECIMENS REMOVED:  1. Proximal ascending colon polyp. 2.  Polyp at 65 cm. 3.  Polyp at 40 cm. IMPLANTS:  None. DRAINS:  None. ESTIMATED BLOOD LOSS:  Minimal.    FINDINGS:  1. Proximal ascending colon polyps. 2.  Polyp at 65 cm. 3.  Polyp at 40 cm. 4.  Rare diverticulosis. DISPOSITION:  Stable. PROCEDURE:  The patient was brought to the operative theater. Monitoring devices and nasal cannula O2 were placed per Anesthesia, and the patient was placed in left side down decubitus position. IV sedation was administered, and a time-out was performed. Digital rectal exam was performed which revealed external hemorrhoids. A well-lubricated Olympus colonoscope was introduced in the patient's rectum and advanced to the cecum. We briefly needed to pry gentle abdominal pressure. The cecum was identified by identification of the ileocecal valve and the appendiceal orifice. The prep was adequate to proceed. The scope was carefully withdrawn with mucosal surfaces circumferentially evaluated. In the proximal ascending colon, there was a 3-mm polyp that was identified and removed by hot snare technique. No other abnormalities were noted in the ascending colon. At 65 cm, we identified a larger polyp that was removed by hot snare technique with specimen retrieved. This was under a centimeter in size. An even smaller polyp at 40 cm was found that was probably 4-5 mm in size and also removed by hot snare technique.   No other abnormalities were detected in in the transverse or descending colon. Rare diverticulosis was identified in the lower sigmoid colon. The scope was pulled back into the rectum and retroflexed which did not reveal any significant lesions. The scope was then straightened out and carefully withdrawn. The patient tolerated the procedure well and was transferred to PACU in stable condition.       Loli Lizama MD      [id number]  D:  10/19/2023 9:25  T:  10/19/2023 12:11  JOB #:  [id number]  CC:  Whitley Troncoso NP

## 2023-11-02 ENCOUNTER — OFFICE VISIT (OUTPATIENT)
Age: 73
End: 2023-11-02
Payer: MEDICARE

## 2023-11-02 VITALS
TEMPERATURE: 98.9 F | SYSTOLIC BLOOD PRESSURE: 127 MMHG | BODY MASS INDEX: 30.01 KG/M2 | HEART RATE: 94 BPM | WEIGHT: 198 LBS | RESPIRATION RATE: 16 BRPM | DIASTOLIC BLOOD PRESSURE: 86 MMHG | OXYGEN SATURATION: 98 % | HEIGHT: 68 IN

## 2023-11-02 DIAGNOSIS — Z86.010 HX OF COLONIC POLYPS: Primary | ICD-10-CM

## 2023-11-02 PROCEDURE — G8417 CALC BMI ABV UP PARAM F/U: HCPCS | Performed by: SURGERY

## 2023-11-02 PROCEDURE — 99213 OFFICE O/P EST LOW 20 MIN: CPT | Performed by: SURGERY

## 2023-11-02 PROCEDURE — 1123F ACP DISCUSS/DSCN MKR DOCD: CPT | Performed by: SURGERY

## 2023-11-02 PROCEDURE — 1036F TOBACCO NON-USER: CPT | Performed by: SURGERY

## 2023-11-02 PROCEDURE — G8484 FLU IMMUNIZE NO ADMIN: HCPCS | Performed by: SURGERY

## 2023-11-02 PROCEDURE — G8427 DOCREV CUR MEDS BY ELIG CLIN: HCPCS | Performed by: SURGERY

## 2023-11-02 PROCEDURE — 3017F COLORECTAL CA SCREEN DOC REV: CPT | Performed by: SURGERY

## 2023-11-02 ASSESSMENT — PATIENT HEALTH QUESTIONNAIRE - PHQ9
2. FEELING DOWN, DEPRESSED OR HOPELESS: 0
SUM OF ALL RESPONSES TO PHQ QUESTIONS 1-9: 0
SUM OF ALL RESPONSES TO PHQ9 QUESTIONS 1 & 2: 0
SUM OF ALL RESPONSES TO PHQ QUESTIONS 1-9: 0
1. LITTLE INTEREST OR PLEASURE IN DOING THINGS: 0

## 2023-11-02 NOTE — PROGRESS NOTES
Identified pt with two pt identifiers (name and ). Reviewed chart in preparation for visit and have obtained necessary documentation. Stefano Meirno is a 81 y.o. male Post-Op Check (Colonoscopy result)  . There were no vitals filed for this visit. 1. Have you been to the ER, urgent care clinic since your last visit? Hospitalized since your last visit?  no     2. Have you seen or consulted any other health care providers outside of the [address] [place] [address] since your last visit? Include any pap smears or colon screening.   yes -

## 2023-11-07 ENCOUNTER — OFFICE VISIT (OUTPATIENT)
Age: 73
End: 2023-11-07
Payer: MEDICARE

## 2023-11-07 VITALS
DIASTOLIC BLOOD PRESSURE: 80 MMHG | HEIGHT: 68 IN | TEMPERATURE: 99 F | WEIGHT: 193.4 LBS | HEART RATE: 78 BPM | BODY MASS INDEX: 29.31 KG/M2 | SYSTOLIC BLOOD PRESSURE: 131 MMHG | OXYGEN SATURATION: 97 % | RESPIRATION RATE: 16 BRPM

## 2023-11-07 DIAGNOSIS — D68.59 OTHER PRIMARY THROMBOPHILIA (HCC): ICD-10-CM

## 2023-11-07 DIAGNOSIS — F33.0 MAJOR DEPRESSIVE DISORDER, RECURRENT, MILD (HCC): ICD-10-CM

## 2023-11-07 DIAGNOSIS — E78.1 HYPERTRIGLYCERIDEMIA: ICD-10-CM

## 2023-11-07 DIAGNOSIS — Z79.01 LONG TERM (CURRENT) USE OF ANTICOAGULANTS: Primary | ICD-10-CM

## 2023-11-07 LAB
POC INR: 37.9
PROTHROMBIN TIME, POC: 3.3

## 2023-11-07 PROCEDURE — G8484 FLU IMMUNIZE NO ADMIN: HCPCS | Performed by: NURSE PRACTITIONER

## 2023-11-07 PROCEDURE — 85610 PROTHROMBIN TIME: CPT | Performed by: NURSE PRACTITIONER

## 2023-11-07 PROCEDURE — G8427 DOCREV CUR MEDS BY ELIG CLIN: HCPCS | Performed by: NURSE PRACTITIONER

## 2023-11-07 PROCEDURE — 99214 OFFICE O/P EST MOD 30 MIN: CPT | Performed by: NURSE PRACTITIONER

## 2023-11-07 PROCEDURE — 1123F ACP DISCUSS/DSCN MKR DOCD: CPT | Performed by: NURSE PRACTITIONER

## 2023-11-07 PROCEDURE — 1036F TOBACCO NON-USER: CPT | Performed by: NURSE PRACTITIONER

## 2023-11-07 PROCEDURE — 3017F COLORECTAL CA SCREEN DOC REV: CPT | Performed by: NURSE PRACTITIONER

## 2023-11-07 PROCEDURE — G8417 CALC BMI ABV UP PARAM F/U: HCPCS | Performed by: NURSE PRACTITIONER

## 2023-11-07 RX ORDER — CHLORAL HYDRATE 500 MG
1000 CAPSULE ORAL DAILY
Qty: 30 CAPSULE | Refills: 0 | Status: SHIPPED | OUTPATIENT
Start: 2023-11-07

## 2023-11-07 RX ORDER — WARFARIN SODIUM 2 MG/1
TABLET ORAL
Qty: 30 TABLET | Refills: 0
Start: 2023-11-07

## 2023-11-07 RX ORDER — WARFARIN SODIUM 3 MG/1
TABLET ORAL
Qty: 90 TABLET | Refills: 1
Start: 2023-11-07

## 2023-11-07 SDOH — ECONOMIC STABILITY: FOOD INSECURITY: WITHIN THE PAST 12 MONTHS, THE FOOD YOU BOUGHT JUST DIDN'T LAST AND YOU DIDN'T HAVE MONEY TO GET MORE.: SOMETIMES TRUE

## 2023-11-07 SDOH — ECONOMIC STABILITY: FOOD INSECURITY: WITHIN THE PAST 12 MONTHS, YOU WORRIED THAT YOUR FOOD WOULD RUN OUT BEFORE YOU GOT MONEY TO BUY MORE.: SOMETIMES TRUE

## 2023-11-07 SDOH — ECONOMIC STABILITY: INCOME INSECURITY: HOW HARD IS IT FOR YOU TO PAY FOR THE VERY BASICS LIKE FOOD, HOUSING, MEDICAL CARE, AND HEATING?: VERY HARD

## 2023-11-07 SDOH — ECONOMIC STABILITY: HOUSING INSECURITY
IN THE LAST 12 MONTHS, WAS THERE A TIME WHEN YOU DID NOT HAVE A STEADY PLACE TO SLEEP OR SLEPT IN A SHELTER (INCLUDING NOW)?: NO

## 2023-11-07 ASSESSMENT — PATIENT HEALTH QUESTIONNAIRE - PHQ9
1. LITTLE INTEREST OR PLEASURE IN DOING THINGS: 0
2. FEELING DOWN, DEPRESSED OR HOPELESS: 0
SUM OF ALL RESPONSES TO PHQ QUESTIONS 1-9: 0
SUM OF ALL RESPONSES TO PHQ9 QUESTIONS 1 & 2: 0

## 2023-11-07 NOTE — PROGRESS NOTES
Subjective:     CC: chronic anticoagulation, sciatica    Teofilo Barnett is a 81 y.o. male who presents today for a 1 month follow up for chronic anticoagulation and sciatica. He takes Coumadin for hx of DVT in left leg back in 2011. He usually takes 3mg daily except Tuesdays he takes 2mg. He had to stop his Coumadin about a month ago and start Lovenox injections to prepare a colonoscopy that took place on 10-19-23. He was able to restart the Coumadin a day or 2 later and today his INR is slightly elevated at 3.3. He denies any s/s of abnormal bleeding. He mentions he is very concerned about his triglycerides. They have been elevated for a couple of years now despite taking niacin. I informed him that the alternative medications including fish oil and fenofibrate would be risky since they both thin the blood. He states he is willing to take that risk, however, and reduce his Coumadin dose to account for it. We will reduce his Coumadin to 2mg BIW and cont 3mg all other days. Lab Results   Component Value Date    CHOL 152 10/10/2023    TRIG 313 (H) 10/10/2023    HDL 33 10/10/2023    LDLCALC 56.4 10/10/2023    LABVLDL 62.6 10/10/2023    CHOLHDLRATIO 4.6 10/10/2023      He is also requesting a referral to a medical marijuana specialist. He is interested in using marijuana for its anti-depressant effects and pain relief.        Patient Active Problem List   Diagnosis    BPH (benign prostatic hyperplasia)    Lupus anticoagulant with hypercoagulable state ([address])    Poor dentition    GERD (gastroesophageal reflux disease)    Depression    Anxiety    Long term (current) use of anticoagulants    History of DVT (deep vein thrombosis)    Hypertriglyceridemia    DJD (degenerative joint disease)    Impaired glucose tolerance    Tendinitis of right wrist    Irritable bowel syndrome with diarrhea    Major depressive disorder, recurrent, mild (HCC)    Osteoarthritis of joints of toes of both feet    Generalized

## 2023-11-07 NOTE — PATIENT INSTRUCTIONS
Marcin Vivas, David Bourne, [address] [facility], [place], [facility] and [place]. Website: [email or web address]  To Apply by phone: 21 [phone] ([facility]) area:   [facility], [place], [place], [facility], [place], [place], and [place]. Website: [email or web address]  For More Information: Call [phone] or email [email or web address]    Meals on [address]:   Website: [email or web address]  To Apply Online: [email or web address]  To Apply by phone: [phone]    Meals on [facility]:  [address]:    [address] [facility] [address] [facility], [facility], [address]  [address]; Monday-Thursdays 10 am- 1 pm     [address] [facility] [address] [facility], [address] [facility]    [phone] Tuesday and Thursday 10 am -12:00 pm  [address], [place], [address] [place] [address]; 8-11:30 am     [address]   [phone]; 1st and 3rd Tuesdays 9-11 am, 3rd Saturdays 9-11 am    [facility] and [address]    [phone]; 2nd, 3rd, and 4th Thursdays 10 am - 12 pm    [facility] of [address] [place], [address]   [phone]; Mondays 10 am-2 pm & Wednesdays 12:45 pm - 230 pm    [address] [facility], [place], [address]   [phone];  Thursdays 1- 4 pm    [address] [facility] [address], [place], [address] [facility]  [phone]; 3rd Wednesdays of the month 1-3 pm    [facility] [address]   2nd and 4th Monday, distribution starts at 5 pm, [facility]   [address] [facility], [address] [place] [address] Drive [address]  1st and 3rd Fridays, distribution starts at 4:30 pm  [address] [facility]

## 2023-11-10 DIAGNOSIS — K58.0 IRRITABLE BOWEL SYNDROME WITH DIARRHEA: ICD-10-CM

## 2023-11-10 NOTE — TELEPHONE ENCOUNTER
90 day supply request of HYOSCYAMINE SULF 0.125 MG tab  Last appt.  11/07    [id number], [address] [phone] - f [phone]

## 2023-11-11 RX ORDER — HYOSCYAMINE SULFATE 0.125 MG
125 TABLET ORAL EVERY 4 HOURS PRN
Qty: 180 TABLET | Refills: 0 | Status: SHIPPED | OUTPATIENT
Start: 2023-11-11

## 2023-12-07 NOTE — TELEPHONE ENCOUNTER
Medication Refill Request    Minda Dumont is requesting a refill of the following medication(s):     Terazosin 5 MG Cap    Please send refill to:     [facility]/pharmacy [address] [facility], [address] [place], [place] [address]anita Carpenter, [address] [place] [address] [phone] - F [phone]  [address]  [address]  Phone: [phone] Fax: [phone]

## 2023-12-08 RX ORDER — TERAZOSIN 5 MG/1
5 CAPSULE ORAL NIGHTLY
Qty: 90 CAPSULE | Refills: 1 | Status: SHIPPED | OUTPATIENT
Start: 2023-12-08

## 2023-12-15 NOTE — TELEPHONE ENCOUNTER
Patient requesting refill on     Requested Prescriptions     Pending Prescriptions Disp Refills    warfarin (COUMADIN) 2 MG tablet [Pharmacy Med Name: WARFARIN SODIUM 2 MG TABLET] 12 tablet 2     Sig: TAKE 1 TAB BY MOUTH ONCE A WEEK ON TUESDAYS        Last OV 11/7/2023

## 2023-12-16 RX ORDER — WARFARIN SODIUM 2 MG/1
TABLET ORAL
Qty: 12 TABLET | Refills: 0 | Status: SHIPPED | OUTPATIENT
Start: 2023-12-16

## 2024-01-09 ENCOUNTER — HOSPITAL ENCOUNTER (OUTPATIENT)
Facility: HOSPITAL | Age: 74
Discharge: HOME | End: 2024-01-12
Payer: MEDICARE

## 2024-01-09 PROCEDURE — 99214 OFFICE O/P EST MOD 30 MIN: CPT | Performed by: PSYCHIATRY & NEUROLOGY

## 2024-01-09 RX ORDER — WARFARIN SODIUM 2 MG/1
TABLET ORAL
Qty: 12 TABLET | Refills: 0 | Status: SHIPPED | OUTPATIENT
Start: 2024-01-09

## 2024-01-09 NOTE — TELEPHONE ENCOUNTER
Patient requesting refill on     Requested Prescriptions     Pending Prescriptions Disp Refills    warfarin (COUMADIN) 2 MG tablet [Pharmacy Med Name: WARFARIN SODIUM 2 MG TABLET] 12 tablet 0     Sig: TAKE 1 TAB BY MOUTH ONCE A WEEK ON TUESDAYS        Last OV 11/7/2023

## 2024-01-09 NOTE — PROGRESS NOTES
INTERVAL HISTORY (In Person):  Mr. Martinez is a 73-year-old  white male following up with me 3 months since his last appointment regarding a longstanding history of severe Generalized Anxiety Disorder as well as a remote history of Depression, for which he has been in remission for some time. He has been maintained on Diazepam for over 50 years as noted below, with reasonable control of his anxiety noted.  He has historically been very sensitive to other medications including other benzos, and has been unable to tolerate many, many meds, but the Valium is something he has tolerated, and has utilized on a controlled basis.  Over the past several years we've also tried Fluoxetine for the mild dysphoria he often has, and more recently we've used Mirtazapine to also help with sleep (and mood). He was feeling slightly better the last 4 times, mostly due to fewer financial concerns.     He comes in today stating that he's been \"pissed off\" a lot, but then he laughs. However, he does report that there's been some increased tension between he and his wife, mostly over minor issues that are longstanding in nature. They don't spend much time together, and he's even suggested they live on separate floors of their home. He's also still strongly invested in several social/political issues in the world, and he spends a lot of time listening and watching shows about them. His N/V functioning has still been fairly good, and he denies feeling hopeless or having any SI at all. His anxiety level seems to be at baseline--still evident but not overwhelming. Still takes the Diazepam at the same schedule as before, and never more than 4 a day.  He denies having any SE's with the meds.         CURRENT MEDICATIONS:  1.  Diazepam 10 mg qid prn--still averages 3-3.5/day  2.  Mirtazapine 30 mg qhs prn (30 mg)--taking nightly     MENTAL STATUS EXAM:  Mr. Martinez was noted to be a casually dressed, adequately groomed 73-year-old who

## 2024-01-18 DIAGNOSIS — E78.1 HYPERTRIGLYCERIDEMIA: Primary | ICD-10-CM

## 2024-01-18 NOTE — TELEPHONE ENCOUNTER
Patient requesting refill on     Requested Prescriptions     Pending Prescriptions Disp Refills    Omega-3 Fatty Acids (FISH OIL) 1000 MG capsule 30 capsule 0     Sig: Take 1 capsule by mouth daily        Last OV 11/7/2023

## 2024-01-18 NOTE — TELEPHONE ENCOUNTER
----- Message from Dacia Gloria sent at 1/18/2024 12:19 PM EST -----  Subject: Refill Request    QUESTIONS  Name of Medication? Omega-3 Fatty Acids (FISH OIL) 1000 MG capsule  Patient-reported dosage and instructions? 1 pill daily  How many days do you have left? 0  Preferred Pharmacy? [facility] #[id number]  Pharmacy phone number (if available)? [phone]  ---------------------------------------------------------------------------  --------------  CALL BACK INFO  What is the best way for the office to contact you? Do not leave any   message, patient will call back for answer  Preferred Call Back Phone Number? [phone]  ---------------------------------------------------------------------------  --------------  SCRIPT ANSWERS  Relationship to Patient? Self

## 2024-01-19 RX ORDER — CHLORAL HYDRATE 500 MG
1000 CAPSULE ORAL DAILY
Qty: 90 CAPSULE | Refills: 1 | Status: SHIPPED | OUTPATIENT
Start: 2024-01-19

## 2024-03-08 RX ORDER — OMEPRAZOLE 20 MG/1
CAPSULE, DELAYED RELEASE ORAL
Qty: 90 CAPSULE | Refills: 1 | Status: SHIPPED | OUTPATIENT
Start: 2024-03-08

## 2024-03-12 NOTE — PROGRESS NOTES
Subjective:     CC: chronic anticoagulation, HLD    Sarabjit Martinez is a 73 y.o. male who presents today for a 3 month follow up for chronic anticoagulation and sciatica.     He takes Coumadin for hx of DVT in left leg back in 2011.  At the last appt he really wanted to start fish oil to help lower his triglycerides. I had warned him that fish oil interacts with Coumadin, putting him at risk for internal bleeding but he stated he was willing to take that risk and was very concerned about his elevated triglycerides so we reduced his dose to 3mg daily except Tuesdays take 2mg.   He denies any s/s of abnormal bleeding.    Last INR 3.3 in , he was supposed to return in 2 weeks but did not come back until today.  INR today at goal 2.6.    He is tolerating the fish oil well. Will recheck lipids today.     Lab Results   Component Value Date    CHOL 152 10/10/2023    TRIG 313 (H) 10/10/2023    HDL 33 10/10/2023    LDLCALC 56.4 10/10/2023    CHOLHDLRATIO 4.6 10/10/2023      At the last OV he was given the information to a medical marijuana specialist per his request. He was hoping it would help with his depression and chronic back pain (right sciatica pain). Today he states he was unable to complete his application.     Health maintenance  PNA vaccines- due for Prevnar 20-_declines  Shingrix-due, declines.  RSV- due, _declines      Patient Active Problem List   Diagnosis    BPH (benign prostatic hyperplasia)    Lupus anticoagulant with hypercoagulable state (HCC)    Poor dentition    GERD (gastroesophageal reflux disease)    Depression    Anxiety    Long term (current) use of anticoagulants    History of DVT (deep vein thrombosis)    Hypertriglyceridemia    DJD (degenerative joint disease)    Impaired glucose tolerance    Tendinitis of right wrist    Irritable bowel syndrome with diarrhea    Major depressive disorder, recurrent, mild (HCC)    Osteoarthritis of joints of toes of both feet    Generalized anxiety disorder

## 2024-03-13 ENCOUNTER — OFFICE VISIT (OUTPATIENT)
Age: 74
End: 2024-03-13
Payer: MEDICARE

## 2024-03-13 VITALS
OXYGEN SATURATION: 98 % | TEMPERATURE: 97 F | HEIGHT: 68 IN | DIASTOLIC BLOOD PRESSURE: 86 MMHG | SYSTOLIC BLOOD PRESSURE: 132 MMHG | RESPIRATION RATE: 18 BRPM | WEIGHT: 193 LBS | HEART RATE: 90 BPM | BODY MASS INDEX: 29.25 KG/M2

## 2024-03-13 DIAGNOSIS — E78.1 HYPERTRIGLYCERIDEMIA: ICD-10-CM

## 2024-03-13 DIAGNOSIS — Z79.01 LONG TERM (CURRENT) USE OF ANTICOAGULANTS: Primary | ICD-10-CM

## 2024-03-13 LAB
POC INR: 2.6
PROTHROMBIN TIME, POC: 31

## 2024-03-13 PROCEDURE — 1036F TOBACCO NON-USER: CPT | Performed by: NURSE PRACTITIONER

## 2024-03-13 PROCEDURE — G8484 FLU IMMUNIZE NO ADMIN: HCPCS | Performed by: NURSE PRACTITIONER

## 2024-03-13 PROCEDURE — G8427 DOCREV CUR MEDS BY ELIG CLIN: HCPCS | Performed by: NURSE PRACTITIONER

## 2024-03-13 PROCEDURE — 99213 OFFICE O/P EST LOW 20 MIN: CPT | Performed by: NURSE PRACTITIONER

## 2024-03-13 PROCEDURE — G8417 CALC BMI ABV UP PARAM F/U: HCPCS | Performed by: NURSE PRACTITIONER

## 2024-03-13 PROCEDURE — 1123F ACP DISCUSS/DSCN MKR DOCD: CPT | Performed by: NURSE PRACTITIONER

## 2024-03-13 PROCEDURE — 36415 COLL VENOUS BLD VENIPUNCTURE: CPT | Performed by: NURSE PRACTITIONER

## 2024-03-13 PROCEDURE — 3017F COLORECTAL CA SCREEN DOC REV: CPT | Performed by: NURSE PRACTITIONER

## 2024-03-13 PROCEDURE — 85610 PROTHROMBIN TIME: CPT | Performed by: NURSE PRACTITIONER

## 2024-03-13 ASSESSMENT — PATIENT HEALTH QUESTIONNAIRE - PHQ9
6. FEELING BAD ABOUT YOURSELF - OR THAT YOU ARE A FAILURE OR HAVE LET YOURSELF OR YOUR FAMILY DOWN: 0
SUM OF ALL RESPONSES TO PHQ9 QUESTIONS 1 & 2: 0
2. FEELING DOWN, DEPRESSED OR HOPELESS: 0
SUM OF ALL RESPONSES TO PHQ QUESTIONS 1-9: 0
3. TROUBLE FALLING OR STAYING ASLEEP: 0
4. FEELING TIRED OR HAVING LITTLE ENERGY: 0
1. LITTLE INTEREST OR PLEASURE IN DOING THINGS: 0
SUM OF ALL RESPONSES TO PHQ QUESTIONS 1-9: 0
8. MOVING OR SPEAKING SO SLOWLY THAT OTHER PEOPLE COULD HAVE NOTICED. OR THE OPPOSITE, BEING SO FIGETY OR RESTLESS THAT YOU HAVE BEEN MOVING AROUND A LOT MORE THAN USUAL: 0
SUM OF ALL RESPONSES TO PHQ QUESTIONS 1-9: 0
10. IF YOU CHECKED OFF ANY PROBLEMS, HOW DIFFICULT HAVE THESE PROBLEMS MADE IT FOR YOU TO DO YOUR WORK, TAKE CARE OF THINGS AT HOME, OR GET ALONG WITH OTHER PEOPLE: 0
5. POOR APPETITE OR OVEREATING: 0
9. THOUGHTS THAT YOU WOULD BE BETTER OFF DEAD, OR OF HURTING YOURSELF: 0
SUM OF ALL RESPONSES TO PHQ QUESTIONS 1-9: 0
7. TROUBLE CONCENTRATING ON THINGS, SUCH AS READING THE NEWSPAPER OR WATCHING TELEVISION: 0

## 2024-03-13 NOTE — PROGRESS NOTES
\"Have you been to the ER, urgent care clinic since your last visit?  Hospitalized since your last visit?\"    NO    “Have you seen or consulted any other health care providers outside of [facility] since your last visit?”    NO            Click Here for Release of Records Request

## 2024-03-14 ENCOUNTER — TELEPHONE (OUTPATIENT)
Age: 74
End: 2024-03-14

## 2024-03-14 ENCOUNTER — CLINICAL DOCUMENTATION (OUTPATIENT)
Age: 74
End: 2024-03-14

## 2024-03-14 LAB
BASOPHILS # BLD: 0.1 K/UL (ref 0–0.1)
BASOPHILS NFR BLD: 1 % (ref 0–1)
CHOLEST SERPL-MCNC: 158 MG/DL
DIFFERENTIAL METHOD BLD: NORMAL
EOSINOPHIL # BLD: 0.3 K/UL (ref 0–0.4)
EOSINOPHIL NFR BLD: 4 % (ref 0–7)
ERYTHROCYTE [DISTWIDTH] IN BLOOD BY AUTOMATED COUNT: 12.5 % (ref 11.5–14.5)
HCT VFR BLD AUTO: 46.5 % (ref 36.6–50.3)
HDLC SERPL-MCNC: 34 MG/DL
HDLC SERPL: 4.6 (ref 0–5)
HGB BLD-MCNC: 16.4 G/DL (ref 12.1–17)
IMM GRANULOCYTES # BLD AUTO: 0 K/UL (ref 0–0.04)
IMM GRANULOCYTES NFR BLD AUTO: 0 % (ref 0–0.5)
LDLC SERPL CALC-MCNC: 68.4 MG/DL (ref 0–100)
LYMPHOCYTES # BLD: 2.2 K/UL (ref 0.8–3.5)
LYMPHOCYTES NFR BLD: 32 % (ref 12–49)
MCH RBC QN AUTO: 31.4 PG (ref 26–34)
MCHC RBC AUTO-ENTMCNC: 35.3 G/DL (ref 30–36.5)
MCV RBC AUTO: 89.1 FL (ref 80–99)
MONOCYTES # BLD: 0.6 K/UL (ref 0–1)
MONOCYTES NFR BLD: 8 % (ref 5–13)
NEUTS SEG # BLD: 3.7 K/UL (ref 1.8–8)
NEUTS SEG NFR BLD: 55 % (ref 32–75)
NRBC # BLD: 0 K/UL (ref 0–0.01)
NRBC BLD-RTO: 0 PER 100 WBC
PLATELET # BLD AUTO: 190 K/UL (ref 150–400)
PMV BLD AUTO: 10.5 FL (ref 8.9–12.9)
RBC # BLD AUTO: 5.22 M/UL (ref 4.1–5.7)
TRIGL SERPL-MCNC: 278 MG/DL
VLDLC SERPL CALC-MCNC: 55.6 MG/DL
WBC # BLD AUTO: 6.8 K/UL (ref 4.1–11.1)

## 2024-03-15 NOTE — TELEPHONE ENCOUNTER
Yes but he should be taking 2 grams BID. I wrote this in a result note they will call him about it.

## 2024-04-02 ENCOUNTER — TELEPHONE (OUTPATIENT)
Age: 74
End: 2024-04-02

## 2024-04-02 NOTE — TELEPHONE ENCOUNTER
----- Message from Linda Hutchison sent at 4/2/2024  3:30 PM EDT -----  Subject: Appointment Request    Reason for Call: Established Patient Appointment needed: Routine Existing   Condition Follow Up    QUESTIONS    Reason for appointment request? Available appointments did not meet   patient need     Additional Information for Provider? patient calling stating he had labs   drawn regarding if the fish oil capsules are working or not and he is   wondering how long he should be taking the reduced dose and if he needs to   be seen next week? please call patient  ---------------------------------------------------------------------------  --------------  CALL BACK INFO  [phone]; Do not leave any message, patient will call back for answer  ---------------------------------------------------------------------------  --------------  SCRIPT ANSWERS

## 2024-04-03 DIAGNOSIS — E78.1 HYPERTRIGLYCERIDEMIA: ICD-10-CM

## 2024-04-03 NOTE — TELEPHONE ENCOUNTER
I would wait at least 1 month to recheck levels after making any change. He should also have his INR checked since fish oil interacts with coumadin.

## 2024-04-03 NOTE — TELEPHONE ENCOUNTER
Patient identified by two patient identifiers, name and date of birth.  Spoke with patient.  Patient aware of results and states understanding at this time.

## 2024-04-04 RX ORDER — CHLORAL HYDRATE 500 MG
2000 CAPSULE ORAL 2 TIMES DAILY
Qty: 120 CAPSULE | Refills: 2 | Status: SHIPPED | OUTPATIENT
Start: 2024-04-04

## 2024-04-09 DIAGNOSIS — F41.1 GENERALIZED ANXIETY DISORDER: Primary | ICD-10-CM

## 2024-04-09 RX ORDER — DIAZEPAM 10 MG/1
10 TABLET ORAL 4 TIMES DAILY PRN
Qty: 120 TABLET | Refills: 5 | Status: SHIPPED | OUTPATIENT
Start: 2024-04-09 | End: 2024-10-06

## 2024-04-22 DIAGNOSIS — M15.9 POLYOSTEOARTHRITIS, UNSPECIFIED: ICD-10-CM

## 2024-04-22 NOTE — TELEPHONE ENCOUNTER
Patient requesting refill on     Requested Prescriptions     Pending Prescriptions Disp Refills    warfarin (COUMADIN) 2 MG tablet [Pharmacy Med Name: WARFARIN SODIUM 2 MG TABLET] 12 tablet 0     Sig: TAKE 1 TAB BY MOUTH ONCE A WEEK ON TUESDAYS    cyclobenzaprine (FLEXERIL) 10 MG tablet [Pharmacy Med Name: CYCLOBENZAPRINE 10 MG TABLET] 90 tablet 0     Sig: TAKE 1 TABLET BY MOUTH EVERY DAY AS NEEDED FOR PAIN    terazosin (HYTRIN) 5 MG capsule [Pharmacy Med Name: TERAZOSIN 5 MG CAPSULE] 90 capsule 1     Sig: TAKE 1 CAPSULE BY MOUTH EVERY DAY AT NIGHT        Last OV 3/13/2024

## 2024-04-23 RX ORDER — CYCLOBENZAPRINE HCL 10 MG
TABLET ORAL
Qty: 90 TABLET | Refills: 1 | Status: SHIPPED | OUTPATIENT
Start: 2024-04-23

## 2024-04-23 RX ORDER — WARFARIN SODIUM 2 MG/1
TABLET ORAL
Qty: 12 TABLET | Refills: 1 | Status: SHIPPED | OUTPATIENT
Start: 2024-04-23

## 2024-04-23 RX ORDER — TERAZOSIN 5 MG/1
CAPSULE ORAL
Qty: 90 CAPSULE | Refills: 1 | Status: SHIPPED | OUTPATIENT
Start: 2024-04-23

## 2024-06-01 DIAGNOSIS — D68.59 OTHER PRIMARY THROMBOPHILIA (HCC): ICD-10-CM

## 2024-06-03 RX ORDER — WARFARIN SODIUM 3 MG/1
TABLET ORAL
Qty: 30 TABLET | Refills: 0 | Status: SHIPPED | OUTPATIENT
Start: 2024-06-03

## 2024-06-13 NOTE — PROGRESS NOTES
Subjective:     CC: chronic anticoagulation, HLD    Sarabjit Martinez is a 73 y.o. male who presents today for a 3 month follow up for chronic anticoagulation and sciatica.     He takes Coumadin for hx of DVT in left leg back in 2011.  Has been taking 3mg daily except Tuesdays take 2mg.   He denies any s/s of abnormal bleeding.    Last INR 2.6 in March, up from 3.3 in .  He is supposed to come every month but only comes every 3 months.   I have ordered him the home INR machine through [facility] in the past per his rquest but he never followed through with it.  INR today at goal 2.1.    Hypertriglyceridemia  His triglycerides were 265 in 2021, up to 341 in 6-2023, down to 313 in .   Fenofibrate and fish oil both interact with his Coumadin but his triglycerides were so high he really wanted to take something. He started the fish oil after  and his coumadin dose was adjusted accordingly.  Triglycerides came down to 278.  Today he states he ran out about a month ago.  Would like his lipids rechecked.  I informed him they will most likely be high without the fish oil but he would like to check anyways.  Will refill fish oil    Lab Results   Component Value Date    CHOL 158 03/13/2024    TRIG 278 (H) 03/13/2024    HDL 34 03/13/2024    LDL 68.4 03/13/2024    VLDL 55.6 03/13/2024    CHOLHDLRATIO 4.6 03/13/2024         New issues: Rash  For 1 month now he has had a large circular red rash with scaly skin to the right inner thigh.  It does not itch.  Will try Lotrisone cream    Ingrown toenails  He would like a referral to podiatry today.    R sciatica  He has chronic intermittent pain in the right buttocks that radiates down the posterior leg.   Aggravated with increased activity.   He cannot take NSAIDS due to Coumadin use.   Has takes acetaminophen and Flexeril as needed.   He also smokes marijuana.   Could not tolerate Gabapentin in the past.   At a previous OV he was given the information to a medical

## 2024-06-14 ENCOUNTER — OFFICE VISIT (OUTPATIENT)
Age: 74
End: 2024-06-14
Payer: MEDICARE

## 2024-06-14 VITALS
SYSTOLIC BLOOD PRESSURE: 123 MMHG | HEIGHT: 68 IN | RESPIRATION RATE: 16 BRPM | BODY MASS INDEX: 29.07 KG/M2 | WEIGHT: 191.8 LBS | DIASTOLIC BLOOD PRESSURE: 80 MMHG | HEART RATE: 72 BPM | OXYGEN SATURATION: 95 % | TEMPERATURE: 99.1 F

## 2024-06-14 DIAGNOSIS — L60.0 INGROWN TOENAIL: ICD-10-CM

## 2024-06-14 DIAGNOSIS — F33.0 MAJOR DEPRESSIVE DISORDER, RECURRENT, MILD (HCC): ICD-10-CM

## 2024-06-14 DIAGNOSIS — Z79.01 LONG TERM (CURRENT) USE OF ANTICOAGULANTS: ICD-10-CM

## 2024-06-14 DIAGNOSIS — E78.1 HYPERTRIGLYCERIDEMIA: ICD-10-CM

## 2024-06-14 DIAGNOSIS — M54.31 RIGHT SIDED SCIATICA: ICD-10-CM

## 2024-06-14 DIAGNOSIS — K58.2 MIXED IRRITABLE BOWEL SYNDROME: ICD-10-CM

## 2024-06-14 DIAGNOSIS — F41.1 GENERALIZED ANXIETY DISORDER: ICD-10-CM

## 2024-06-14 DIAGNOSIS — Z00.00 MEDICARE ANNUAL WELLNESS VISIT, SUBSEQUENT: Primary | ICD-10-CM

## 2024-06-14 PROBLEM — D68.62 LUPUS ANTICOAGULANT WITH HYPERCOAGULABLE STATE (HCC): Status: RESOLVED | Noted: 2021-07-08 | Resolved: 2024-06-14

## 2024-06-14 LAB
POC INR: 2.1
PROTHROMBIN TIME, POC: 24

## 2024-06-14 PROCEDURE — 85610 PROTHROMBIN TIME: CPT | Performed by: NURSE PRACTITIONER

## 2024-06-14 RX ORDER — CHLORAL HYDRATE 500 MG
2000 CAPSULE ORAL 2 TIMES DAILY
Qty: 120 CAPSULE | Refills: 11 | Status: SHIPPED | OUTPATIENT
Start: 2024-06-14

## 2024-06-14 RX ORDER — CLOTRIMAZOLE AND BETAMETHASONE DIPROPIONATE 10; .64 MG/G; MG/G
CREAM TOPICAL
Qty: 15 G | Refills: 0 | Status: SHIPPED | OUTPATIENT
Start: 2024-06-14

## 2024-06-14 RX ORDER — ACETAMINOPHEN 500 MG
500-1000 TABLET ORAL EVERY 6 HOURS PRN
Qty: 60 TABLET | Refills: 0 | COMMUNITY
Start: 2024-06-14

## 2024-06-14 ASSESSMENT — PATIENT HEALTH QUESTIONNAIRE - PHQ9
10. IF YOU CHECKED OFF ANY PROBLEMS, HOW DIFFICULT HAVE THESE PROBLEMS MADE IT FOR YOU TO DO YOUR WORK, TAKE CARE OF THINGS AT HOME, OR GET ALONG WITH OTHER PEOPLE: NOT DIFFICULT AT ALL
SUM OF ALL RESPONSES TO PHQ QUESTIONS 1-9: 10
SUM OF ALL RESPONSES TO PHQ QUESTIONS 1-9: 10
7. TROUBLE CONCENTRATING ON THINGS, SUCH AS READING THE NEWSPAPER OR WATCHING TELEVISION: NOT AT ALL
4. FEELING TIRED OR HAVING LITTLE ENERGY: SEVERAL DAYS
9. THOUGHTS THAT YOU WOULD BE BETTER OFF DEAD, OR OF HURTING YOURSELF: NOT AT ALL
8. MOVING OR SPEAKING SO SLOWLY THAT OTHER PEOPLE COULD HAVE NOTICED. OR THE OPPOSITE, BEING SO FIGETY OR RESTLESS THAT YOU HAVE BEEN MOVING AROUND A LOT MORE THAN USUAL: NOT AT ALL
2. FEELING DOWN, DEPRESSED OR HOPELESS: MORE THAN HALF THE DAYS
5. POOR APPETITE OR OVEREATING: MORE THAN HALF THE DAYS
3. TROUBLE FALLING OR STAYING ASLEEP: NOT AT ALL
6. FEELING BAD ABOUT YOURSELF - OR THAT YOU ARE A FAILURE OR HAVE LET YOURSELF OR YOUR FAMILY DOWN: MORE THAN HALF THE DAYS
SUM OF ALL RESPONSES TO PHQ QUESTIONS 1-9: 10
1. LITTLE INTEREST OR PLEASURE IN DOING THINGS: NEARLY EVERY DAY
SUM OF ALL RESPONSES TO PHQ9 QUESTIONS 1 & 2: 5
SUM OF ALL RESPONSES TO PHQ QUESTIONS 1-9: 10

## 2024-06-14 ASSESSMENT — LIFESTYLE VARIABLES
HOW MANY STANDARD DRINKS CONTAINING ALCOHOL DO YOU HAVE ON A TYPICAL DAY: PATIENT DOES NOT DRINK
HOW OFTEN DO YOU HAVE A DRINK CONTAINING ALCOHOL: NEVER

## 2024-06-14 NOTE — PROGRESS NOTES
Chief Complaint   Patient presents with    Coagulation Disorder       Vitals:    06/14/24 1539   BP: 123/80   Pulse: 72   Resp: 16   Temp: 99.1 °F (37.3 °C)   SpO2: 95%   \"Have you been to the ER, urgent care clinic since your last visit?  Hospitalized since your last visit?\"    NO    “Have you seen or consulted any other health care providers outside of [facility] since your last visit?”    NO            Click Here for Release of Records Request

## 2024-06-17 ENCOUNTER — CLINICAL DOCUMENTATION (OUTPATIENT)
Age: 74
End: 2024-06-17

## 2024-06-17 ENCOUNTER — HOSPITAL ENCOUNTER (OUTPATIENT)
Facility: HOSPITAL | Age: 74
Discharge: HOME | End: 2024-06-20
Payer: MEDICARE

## 2024-06-17 PROCEDURE — 99214 OFFICE O/P EST MOD 30 MIN: CPT | Performed by: PSYCHIATRY & NEUROLOGY

## 2024-06-17 NOTE — PROGRESS NOTES
INTERVAL HISTORY (In Person):  Mr. Martinez is a 73-year-old  male who is following up with me 5+ months since his last appointment regarding a longstanding history of severe Generalized Anxiety Disorder as well as a remote history of Depression, for which he has been in remission for some time. He has been maintained on Diazepam for over 50 years as noted below, with reasonable control of his anxiety noted.  He has historically been very sensitive to other medications including other benzos, and has been unable to tolerate many, many meds, but the Valium is something he has tolerated, and has utilized on a controlled basis.  Over the past several years we've also tried Fluoxetine for the mild dysphoria he often has, and more recently we've used Mirtazapine to also help with sleep (and mood). He was feeling slightly better the last 5 times, mostly due to fewer financial concerns.     He comes in today stating that he's been \"the same more or less\", although he then indicated that he's still feeling mild to moderately frustrated with a number of political and social issues. However, he's not overwhelmingly stressed, and he doesn't think he's being affected by it a emotionally or behaviorally. His situation with his wife is still poor, but not overly conflictual--they tend to go their separate ways, and stay in separate parts of the house. His N/V functioning has still been fairly good, and he denies feeling hopeless or having any SI at all. His anxiety level seems to be at baseline--still evident but not overwhelming. Still takes the Diazepam at the same schedule as before, and never more than 4 a day.  He denies having any SE's with the meds.         CURRENT MEDICATIONS:  1.  Diazepam 10 mg qid prn--still averages 3-3.5/day  2.  Mirtazapine 30 mg qhs prn (30 mg)--taking nightly     MENTAL STATUS EXAM:  Mr. Martinez was noted to be a casually dressed, adequately groomed 73-year-old who appeared his stated age.

## 2024-07-31 RX ORDER — CLOTRIMAZOLE AND BETAMETHASONE DIPROPIONATE 10; .64 MG/G; MG/G
CREAM TOPICAL
Qty: 15 G | Refills: 0 | Status: SHIPPED | OUTPATIENT
Start: 2024-07-31

## 2024-07-31 NOTE — TELEPHONE ENCOUNTER
Medication Refill Request    Sarabjit ravi is requesting a refill of the following medication(s):   Clotrimazole-Betamethasone 1%  Please send refill to:     [facility]/pharmacy #[id number] - [place], [place] - [address] [facility] [phone] - F [phone]  [address] [facility] [address]  Phone: [phone] Fax: [phone]

## 2024-08-15 DIAGNOSIS — D68.59 OTHER PRIMARY THROMBOPHILIA (HCC): ICD-10-CM

## 2024-08-16 RX ORDER — WARFARIN SODIUM 3 MG/1
TABLET ORAL
Qty: 90 TABLET | Refills: 1 | Status: SHIPPED | OUTPATIENT
Start: 2024-08-16

## 2024-10-08 DIAGNOSIS — F41.1 GENERALIZED ANXIETY DISORDER: Primary | ICD-10-CM

## 2024-10-08 RX ORDER — DIAZEPAM 10 MG
10 TABLET ORAL 4 TIMES DAILY PRN
Qty: 120 TABLET | Refills: 5 | Status: SHIPPED | OUTPATIENT
Start: 2024-10-08 | End: 2025-04-06

## 2024-10-16 RX ORDER — TERAZOSIN 5 MG/1
5 CAPSULE ORAL NIGHTLY
Qty: 90 CAPSULE | Refills: 1 | Status: SHIPPED | OUTPATIENT
Start: 2024-10-16

## 2024-10-17 ENCOUNTER — HOSPITAL ENCOUNTER (OUTPATIENT)
Facility: HOSPITAL | Age: 74
Discharge: HOME | End: 2024-10-20
Payer: MEDICARE

## 2024-10-17 PROCEDURE — 99214 OFFICE O/P EST MOD 30 MIN: CPT | Performed by: PSYCHIATRY & NEUROLOGY

## 2024-10-17 RX ORDER — MIRTAZAPINE 30 MG/1
30 TABLET, FILM COATED ORAL NIGHTLY
Qty: 90 TABLET | Refills: 3 | Status: SHIPPED | OUTPATIENT
Start: 2024-10-17

## 2024-10-17 NOTE — PROGRESS NOTES
INTERVAL HISTORY (In Person):  Mr. Martinez is a 73-year-old  male who is following up with me 4 months since his last appointment regarding a longstanding history of severe Generalized Anxiety Disorder as well as a remote history of Depression, for which he has been in remission for some time. He has been maintained on Diazepam for over 50+ years as noted below, with reasonable control of his anxiety noted.  He has historically been very sensitive to other medications including other benzos, and has been unable to tolerate many, many meds, but the Valium is something he has tolerated, and has utilized on a controlled basis.  Over the past several years we've also tried Fluoxetine for the mild dysphoria he often has, and more recently we've used Mirtazapine to also help with sleep (and mood). He was feeling slightly better the last 6 times, mostly due to fewer financial concerns. However, various marital and political/social issues continue to stress him.      He comes in today stating that he's been feeling \"pretty good\" overall, and that he's not had any real trouble with his mood or anxiety issues. However, he did go through 3 difficult days when he tried to decrease the Remeron, likely just down to ~22.5 mg or so. He felt worse affectively, but not any more anxious. We discussed possibly trying to slowly taper down on it in the future if he feels the need to do so, but that it's safe to simply stay on the 30 mg dose. He's still feeling mild to moderately frustrated with a number of political and social issues, and I tried to assess how strongly he's been impacted, but he seemed to misunderstand my questioning to imply he's doing something wrong. Tolerating the meds well, without any SE's noted or reported. His situation with his wife is still poor, but not overly conflictual--they tend to go their separate ways, and stay in separate parts of the house. His N/V functioning has still been fairly good, and he

## 2024-10-22 RX ORDER — CLOTRIMAZOLE AND BETAMETHASONE DIPROPIONATE 10; .64 MG/G; MG/G
CREAM TOPICAL
Qty: 15 G | Refills: 0 | Status: SHIPPED | OUTPATIENT
Start: 2024-10-22

## 2024-10-22 NOTE — TELEPHONE ENCOUNTER
Medication Refill Request    Sarabjit ravi is requesting a refill of the following medication(s):   Clotrimazole-betamethasone cream  Please send refill to:     [facility]/pharmacy #[id number] - [place], [place] - [address] [facility] [phone] - F [phone]  [address] [facility] [address]  Phone: [phone] Fax: [phone]

## 2024-11-15 ENCOUNTER — OFFICE VISIT (OUTPATIENT)
Age: 74
End: 2024-11-15
Payer: MEDICARE

## 2024-11-15 VITALS
TEMPERATURE: 97.5 F | BODY MASS INDEX: 29.31 KG/M2 | HEIGHT: 68 IN | RESPIRATION RATE: 16 BRPM | SYSTOLIC BLOOD PRESSURE: 115 MMHG | DIASTOLIC BLOOD PRESSURE: 72 MMHG | HEART RATE: 72 BPM | WEIGHT: 193.4 LBS | OXYGEN SATURATION: 97 %

## 2024-11-15 DIAGNOSIS — B35.1 ONYCHOMYCOSIS: ICD-10-CM

## 2024-11-15 DIAGNOSIS — F41.1 GENERALIZED ANXIETY DISORDER: ICD-10-CM

## 2024-11-15 DIAGNOSIS — E78.1 HYPERTRIGLYCERIDEMIA: ICD-10-CM

## 2024-11-15 DIAGNOSIS — Z86.718 HISTORY OF DVT (DEEP VEIN THROMBOSIS): ICD-10-CM

## 2024-11-15 DIAGNOSIS — Z79.01 LONG TERM (CURRENT) USE OF ANTICOAGULANTS: Primary | ICD-10-CM

## 2024-11-15 DIAGNOSIS — N40.1 BENIGN PROSTATIC HYPERPLASIA WITH LOWER URINARY TRACT SYMPTOMS, SYMPTOM DETAILS UNSPECIFIED: ICD-10-CM

## 2024-11-15 DIAGNOSIS — K58.2 MIXED IRRITABLE BOWEL SYNDROME: ICD-10-CM

## 2024-11-15 LAB
POC INR: 2.6
PROTHROMBIN TIME, POC: NORMAL

## 2024-11-15 PROCEDURE — 85610 PROTHROMBIN TIME: CPT | Performed by: NURSE PRACTITIONER

## 2024-11-15 SDOH — ECONOMIC STABILITY: FOOD INSECURITY: WITHIN THE PAST 12 MONTHS, THE FOOD YOU BOUGHT JUST DIDN'T LAST AND YOU DIDN'T HAVE MONEY TO GET MORE.: SOMETIMES TRUE

## 2024-11-15 SDOH — ECONOMIC STABILITY: INCOME INSECURITY: HOW HARD IS IT FOR YOU TO PAY FOR THE VERY BASICS LIKE FOOD, HOUSING, MEDICAL CARE, AND HEATING?: NOT HARD AT ALL

## 2024-11-15 SDOH — ECONOMIC STABILITY: FOOD INSECURITY: WITHIN THE PAST 12 MONTHS, YOU WORRIED THAT YOUR FOOD WOULD RUN OUT BEFORE YOU GOT MONEY TO BUY MORE.: SOMETIMES TRUE

## 2024-11-15 ASSESSMENT — PATIENT HEALTH QUESTIONNAIRE - PHQ9
SUM OF ALL RESPONSES TO PHQ9 QUESTIONS 1 & 2: 0
SUM OF ALL RESPONSES TO PHQ QUESTIONS 1-9: 0
SUM OF ALL RESPONSES TO PHQ QUESTIONS 1-9: 0
1. LITTLE INTEREST OR PLEASURE IN DOING THINGS: NOT AT ALL
SUM OF ALL RESPONSES TO PHQ QUESTIONS 1-9: 0
2. FEELING DOWN, DEPRESSED OR HOPELESS: NOT AT ALL
SUM OF ALL RESPONSES TO PHQ QUESTIONS 1-9: 0

## 2024-11-15 NOTE — PROGRESS NOTES
Chief Complaint   Patient presents with    mouth problem     Needs dentures       Vitals:    11/15/24 1327   BP: 115/72   Pulse: 72   Resp: 16   Temp: 97.5 °F (36.4 °C)   SpO2: 97%   \"Have you been to the ER, urgent care clinic since your last visit?  Hospitalized since your last visit?\"    NO    “Have you seen or consulted any other health care providers outside our system since your last visit?”    NO

## 2024-11-15 NOTE — PATIENT INSTRUCTIONS
[address] if need more resources)      Homeless Connection Line (Local)  What they offer: The line facilitates access to resources and shelter alternatives for those who are currently homeless or 3 days or less away from losing their housing.  They also provide information for the inclement weather shelters when available.    Areas served: [place], [place], [place], [place], [place], [place], [place],  [facility]  Phone Number: [phone] **Contact this number first. It is a centralized point of entry for services.    Hours of Operation: Monday - Friday 8AM - 9PM; Saturday & Sunday 1PM - 9PM    Housing Resource Line  What they offer: Centralized line to help residents connect to programs and services that will help address their housing needs.            Needs addressed by HRL: Financial Assistance, Financial Education, Homebuyer Education, Emergency Assistance, Foreclosure Prevention, Legal Support, Locating Rental Options, Rehabs & Repairs, Ramps  Serve residents of [facility], [facility], [facility], [facility], [facility], [facility], [facility], [facility], and [facility].  Website:    Phone Number: [phone]  Hours of Operation: Monday-Friday, 8:30AM-4:30PM     [employer]  What they offer: [employer] is the regional planning and coordinating agency for homeless services.  Website:  https://www.[employer].org/get-help  [facility] Street Sheets - Street Sheets are a valuable resource for those seeking resources. These sheets are located on the [employer] home page.  Phone Number: 345.856.4148     [employer] [facility] Homeless - National Call Center  What they offer: Free 24/7 access to trained counselors for local resources and assistance  Website: https://www.[place].gov[place]hom[place]/nationalcallcenter.asp  Phone Number: 341.360.1569 (text messaging accepted)  Medicaid [place] Coordinated Care Plus (CCCP)  What they offer:

## 2024-11-15 NOTE — PROGRESS NOTES
Subjective:     CC: chronic anticoagulation, HLD    Sarabjit Martinez is a 74 y.o. male who presents today to follow up for chronic anticoagulation and sciatica. It has been 5 months since I last saw him.     He takes Coumadin for hx of DVT in left leg back in 2011.  Has been taking 3mg daily except Tuesdays take 2mg.   He denies any s/s of abnormal bleeding.    Last INR 2.1 in June (he refuses to come every month).   I have ordered him the home INR machine through [facility] in the past per his rquest but he never followed through with it.      Hypertriglyceridemia  His triglycerides were 265 in 2021, up to 341 in 6-2023, down to 313 in .   Fenofibrate and fish oil both interact with his Coumadin but his triglycerides were so high he really wanted to take something. He started the fish oil after  and his coumadin dose was adjusted accordingly.  Triglycerides came down to 278.      Lab Results   Component Value Date    CHOL 158 03/13/2024    TRIG 278 (H) 03/13/2024    HDL 34 03/13/2024    LDL 68.4 03/13/2024    VLDL 55.6 03/13/2024    CHOLHDLRATIO 4.6 03/13/2024         R sciatica  He has chronic intermittent pain in the right buttocks that radiates down the posterior leg.   Aggravated with increased activity.   He cannot take NSAIDS due to Coumadin use.   Has takes acetaminophen and Flexeril as needed.   He also smokes marijuana.   Could not tolerate Gabapentin in the past.   At a previous OV he was given the information to a medical marijuana specialist per his request. Unfortunately he was unable to complete his application.     GUMARO and Major depression  Followed by psych Dr Escamilla.     Takes Mirtazipine qHS with Valium prn.    IBS  Takes hyoscyamine prn abdominal cramping/ diarrhea.   Takes stool softener and miralax as needed for constipation.     BPH  Symptoms well controlled with Terazosin qHS.     Ingrown toenails  At the last OV he was referred to podiatry. They discussed the option of treating his

## 2024-12-04 RX ORDER — WARFARIN SODIUM 2 MG/1
TABLET ORAL
Qty: 12 TABLET | Refills: 1 | Status: SHIPPED | OUTPATIENT
Start: 2024-12-04

## 2024-12-04 NOTE — TELEPHONE ENCOUNTER
Patient requesting refill on     Requested Prescriptions     Pending Prescriptions Disp Refills    warfarin (COUMADIN) 2 MG tablet [Pharmacy Med Name: WARFARIN SODIUM 2 MG TABLET] 12 tablet 1     Sig: TAKE 1 TAB BY MOUTH ONCE A WEEK ON TUESDAYS        Last OV 11/15/2024

## 2024-12-17 DIAGNOSIS — K58.0 IRRITABLE BOWEL SYNDROME WITH DIARRHEA: ICD-10-CM

## 2024-12-19 ENCOUNTER — LAB (OUTPATIENT)
Age: 74
End: 2024-12-19
Payer: MEDICARE

## 2024-12-19 DIAGNOSIS — K58.2 MIXED IRRITABLE BOWEL SYNDROME: ICD-10-CM

## 2024-12-19 DIAGNOSIS — B35.1 ONYCHOMYCOSIS: ICD-10-CM

## 2024-12-19 DIAGNOSIS — E78.1 HYPERTRIGLYCERIDEMIA: ICD-10-CM

## 2024-12-19 LAB
ALBUMIN SERPL-MCNC: 4.1 G/DL (ref 3.5–5)
ALBUMIN/GLOB SERPL: 1.4 (ref 1.1–2.2)
ALP SERPL-CCNC: 51 U/L (ref 45–117)
ALT SERPL-CCNC: 25 U/L (ref 12–78)
ANION GAP SERPL CALC-SCNC: 4 MMOL/L (ref 2–12)
AST SERPL-CCNC: 15 U/L (ref 15–37)
BASOPHILS # BLD: 0.1 K/UL (ref 0–0.1)
BASOPHILS NFR BLD: 1 % (ref 0–1)
BILIRUB SERPL-MCNC: 0.6 MG/DL (ref 0.2–1)
BUN SERPL-MCNC: 14 MG/DL (ref 6–20)
BUN/CREAT SERPL: 11 (ref 12–20)
CALCIUM SERPL-MCNC: 9 MG/DL (ref 8.5–10.1)
CHLORIDE SERPL-SCNC: 106 MMOL/L (ref 97–108)
CHOLEST SERPL-MCNC: 138 MG/DL
CO2 SERPL-SCNC: 31 MMOL/L (ref 21–32)
CREAT SERPL-MCNC: 1.24 MG/DL (ref 0.7–1.3)
DIFFERENTIAL METHOD BLD: ABNORMAL
EOSINOPHIL # BLD: 0.3 K/UL (ref 0–0.4)
EOSINOPHIL NFR BLD: 4 % (ref 0–7)
ERYTHROCYTE [DISTWIDTH] IN BLOOD BY AUTOMATED COUNT: 12.2 % (ref 11.5–14.5)
GLOBULIN SER CALC-MCNC: 2.9 G/DL (ref 2–4)
GLUCOSE SERPL-MCNC: 108 MG/DL (ref 65–100)
HCT VFR BLD AUTO: 45.7 % (ref 36.6–50.3)
HDLC SERPL-MCNC: 32 MG/DL
HDLC SERPL: 4.3 (ref 0–5)
HGB BLD-MCNC: 15.6 G/DL (ref 12.1–17)
IMM GRANULOCYTES # BLD AUTO: 0 K/UL (ref 0–0.04)
IMM GRANULOCYTES NFR BLD AUTO: 1 % (ref 0–0.5)
LDLC SERPL CALC-MCNC: 49.2 MG/DL (ref 0–100)
LYMPHOCYTES # BLD: 3 K/UL (ref 0.8–3.5)
LYMPHOCYTES NFR BLD: 35 % (ref 12–49)
MCH RBC QN AUTO: 31.5 PG (ref 26–34)
MCHC RBC AUTO-ENTMCNC: 34.1 G/DL (ref 30–36.5)
MCV RBC AUTO: 92.1 FL (ref 80–99)
MONOCYTES # BLD: 0.8 K/UL (ref 0–1)
MONOCYTES NFR BLD: 9 % (ref 5–13)
NEUTS SEG # BLD: 4.5 K/UL (ref 1.8–8)
NEUTS SEG NFR BLD: 50 % (ref 32–75)
NRBC # BLD: 0 K/UL (ref 0–0.01)
NRBC BLD-RTO: 0 PER 100 WBC
PLATELET # BLD AUTO: 201 K/UL (ref 150–400)
PMV BLD AUTO: 10.4 FL (ref 8.9–12.9)
POTASSIUM SERPL-SCNC: 4 MMOL/L (ref 3.5–5.1)
PROT SERPL-MCNC: 7 G/DL (ref 6.4–8.2)
RBC # BLD AUTO: 4.96 M/UL (ref 4.1–5.7)
SODIUM SERPL-SCNC: 141 MMOL/L (ref 136–145)
TRIGL SERPL-MCNC: 284 MG/DL
VLDLC SERPL CALC-MCNC: 56.8 MG/DL
WBC # BLD AUTO: 8.8 K/UL (ref 4.1–11.1)

## 2024-12-19 PROCEDURE — 36415 COLL VENOUS BLD VENIPUNCTURE: CPT | Performed by: NURSE PRACTITIONER

## 2024-12-20 DIAGNOSIS — K58.0 IRRITABLE BOWEL SYNDROME WITH DIARRHEA: ICD-10-CM

## 2024-12-20 RX ORDER — HYOSCYAMINE SULFATE 0.12 MG/1
TABLET ORAL EVERY 4 HOURS PRN
Qty: 180 TABLET | Refills: 0 | OUTPATIENT
Start: 2024-12-20

## 2024-12-20 RX ORDER — HYOSCYAMINE SULFATE 0.125 MG
TABLET ORAL EVERY 4 HOURS PRN
Qty: 180 TABLET | Refills: 0 | Status: SHIPPED | OUTPATIENT
Start: 2024-12-20

## 2024-12-26 NOTE — PROGRESS NOTES
Specialty Pharmacy Patient Management Program  Oncology Refill Outreach      Semaj is a 71 y.o. male contacted today regarding refills of his medication(s).    Specialty medication(s) and dose(s) confirmed: Completed refill outreach. [facility] will mail abiraterone acetate (ZYTIGA) 500 MG tablet  and predniSONE (DELTASONE) 5 MG tablet once ready to dispense.     Other medications being refilled: N/A    Refill Questions      Flowsheet Row Most Recent Value   Changes to allergies? No   Changes to medications? No   New conditions or infections since last clinic visit No   Unplanned office visit, urgent care, ED, or hospital admission in the last 4 weeks  No   How does patient/caregiver feel medication is working? Good   Financial problems or insurance changes  No   Since the previous refill, were any specialty medication doses or scheduled injections missed or delayed?  No   Does this patient require a clinical escalation to a pharmacist? No            Delivery Questions      Flowsheet Row Most Recent Value   Delivery method UPS   Delivery address verified with patient/caregiver? Yes   Number of medications in delivery 2   Medication(s) being filled and delivered predniSONE (DELTASONE), Abiraterone Acetate (ZYTIGA)   Doses left of specialty medications 6 days   Copay verified? Yes   Copay amount $0   Copay form of payment No copayment ($0)   Ship Date 12/30   Delivery Date 12/31   Signature Required No                   Follow-up: 21 days     Oralia Castillo  Care Coordinator, [facility]  12/26/2024  13:41 EST           Subjective:     CC: anticoagulation    Nabor Beltre is a 81 y.o. male who presents today for a 1 month follow up for chronic anticoagulation. He takes Coumadin for hx of DVT back in 2011. He is taking 3mg daily except Tuesdays he takes 2mg. The past two INRs have been at goal; 2.9 in November, 2.8 in December. It has been over two months since his last check but INR today it is at goal again at 2.6. He denies any s/s or abnormal bleeding or bruising. He denies any GI bleed or hematuria. He does request a refill of his Coumadin 3mg tablets today. He is also requesting a refill of his Cyclobenzaprine at this visit today. He states that he takes this as needed for pain associated with his arthritis/degenerative joint disease. Pain is described as achy is located primarily in his neck, shoulders and hips. He notes that he takes Cyclobenzaprine perhaps 4-5 tablets per week to help him with this pain and he describes that he typically will only take a half of a tablet to begin and then take the other half if needed. He will rarely take Acetaminophen for pain and feels like the Cyclobenzaprine does a good job controlling his symptoms. Lab Results   Component Value Date/Time    Hemoglobin (POC) 22.8 09/07/2022 03:16 PM    HGB 15.4 10/25/2022 10:17 PM       Patient Active Problem List   Diagnosis Code    Long term (current) use of anticoagulants Z79.01    Depression F32. A    Impaired glucose tolerance R73.02    DJD (degenerative joint disease) M19.90    GERD (gastroesophageal reflux disease) K21.9    Anxiety F41.9    BPH (benign prostatic hyperplasia) N40.0    History of DVT (deep vein thrombosis) Z86.718    Hypertriglyceridemia E78.1    Lupus anticoagulant with hypercoagulable state ([facility] [address] D68.62    Tendinitis of right wrist M77.8    Poor dentition K08.9    Irritable bowel syndrome with diarrhea K58.0       Past Medical History:   Diagnosis Date    Anxiety     BPH (benign prostatic hyperplasia)     Chronic anticoagulation     Depression     DJD (degenerative joint disease)     DVT of leg (deep venous thrombosis) ([facility])     ED (erectile dysfunction)     GERD (gastroesophageal reflux disease)     Hypercoagulable state ([facility])     lupus coag    Impaired glucose tolerance          Current Outpatient Medications:     omeprazole (PRILOSEC) 20 mg capsule, TAKE 1 CAPSULE BY MOUTH EVERY DAY, Disp: 90 Capsule, Rfl: 1    mirtazapine (REMERON) 30 mg tablet, Take 1 Tablet by mouth nightly., Disp: 90 Tablet, Rfl: 3    diazePAM (VALIUM) 10 mg tablet, Take 1 Tablet by mouth four (4) times daily as needed for Anxiety. Max Daily Amount: 40 mg., Disp: 120 Tablet, Rfl: 5    terazosin (HYTRIN) 5 mg capsule, Take 1 Capsule by mouth nightly., Disp: 90 Capsule, Rfl: 1    warfarin (COUMADIN) 3 mg tablet, TAKE 1 TABLET TUESDAY THURSDAY SATURDAY SUNDAY, Disp: 60 Tablet, Rfl: 0    hyoscyamine (ANASPAZ, LEVSIN) 0.125 mg tablet, TAKE 1 TABLET BY MOUTH EVERY 4 HOURS AS NEEDED FOR PAIN, Disp: 60 Tablet, Rfl: 5    warfarin (COUMADIN) 2 mg tablet, TAKE 1 TABLET BY MOUTH ONCE A WEEK, Disp: 30 Tablet, Rfl: 0    cyclobenzaprine (FLEXERIL) 10 mg tablet, TAKE 1 TABLET BY MOUTH THREE (3) TIMES DAILY AS NEEDED FOR MUSCLE SPASMS (Patient not taking: Reported on 12/28/2022), Disp: 90 Tablet, Rfl: 1    polyethylene glycol (MIRALAX) 17 gram/dose powder, Take 17 g by mouth daily. , Disp: 527 g, Rfl: 3    Allergies   Allergen Reactions    Haldol [Haloperidol Lactate] Rash       Past Surgical History:   Procedure Laterality Date    HX ENDOSCOPY  2011    FL ABDOMEN SURGERY PROC UNLISTED  2011    ischemic bowel d/t hypercoagulable state       Social History     Tobacco Use   Smoking Status Never   Smokeless Tobacco Never       Social History     Socioeconomic History    Marital status:    Tobacco Use    Smoking status: Never    Smokeless tobacco: Never   Vaping Use    Vaping Use: Never used   Substance and Sexual Activity    Alcohol use: No    Drug use:  Yes Types: Marijuana    Sexual activity: Yes       Family History   Problem Relation Age of Onset    Heart Disease Father     Heart Disease Brother        ROS:  Gen: denies fever, chills, or fatigue  HEENT: denies epistaxis or bleeding gums  Resp: denies dyspnea, cough, or wheezing  CV: denies chest pain, pressure, or palpitations  Extremeties: denies edema  GI[de-identified] +occas abdominal pain/diarrhea/constipation related to IBS denies N/V, hematochezia, or melena  : +nocturia, denies dysuria or hematuria  Musculoskeletal: +chronic neck/back and hip pain  Neuro: denies numbness/tingling, extremity weakness, or dizziness  Skin: denies rashes or new lesions   Heme: denies easy bleeding or bruising    Objective:     Visit Vitals  /84   Pulse 66   Resp 18   Ht 5' 8\" (1.727 m)   SpO2 98%   BMI 32.23 kg/m²             General: Alert and oriented. No acute distress. Well nourished. HEENT :  Eyes: Sclera white, conjunctiva clear. Extra ocular movements intact. Neck: Supple with FROM. Lungs: Breathing even and unlabored. All lobes clear to auscultation bilaterally   Heart :RRR, S1 and S2 normal intensity, no extra heart sounds  Extremities: Non-edematous. Neuro: Cranial nerves grossly normal.  Psych: Mood and thought content appropriate for situation. Dressed appropriately and with good hygiene. Skin: Warm and dry and intact    Assessment/ Plan:     Chronic anticoagulation  INR today is at goal, 2.6  Cont Coumadin 3mg daily except for Tuesdays take 2mg  Notify provider or go to ER for s/s of abnormal bleeding  F/U 1 month    Degenerative joint disease  Refill Cyclobenzaprine 10mg  OK to take Acetaminophen OTC for pain  F/U prn        Verbal and written instructions (see AVS) provided. Patient expresses understanding of diagnosis and treatment plan.     Health Maintenance Due   Topic Date Due    DTaP/Tdap/Td series (1 - Tdap) Never done    Colorectal Cancer Screening Combo  Never done         I saw this patient with nurse practitioner student Artem Pittman. Madhuri Brothers.  Jay Newton, NP

## 2025-02-16 DIAGNOSIS — D68.59 OTHER PRIMARY THROMBOPHILIA: ICD-10-CM

## 2025-02-17 RX ORDER — WARFARIN SODIUM 3 MG/1
TABLET ORAL
Qty: 90 TABLET | Refills: 1 | Status: SHIPPED | OUTPATIENT
Start: 2025-02-17

## 2025-02-18 ENCOUNTER — HOSPITAL ENCOUNTER (OUTPATIENT)
Facility: HOSPITAL | Age: 75
Discharge: HOME | End: 2025-02-21
Payer: MEDICARE

## 2025-02-18 PROCEDURE — 99214 OFFICE O/P EST MOD 30 MIN: CPT | Performed by: PSYCHIATRY & NEUROLOGY

## 2025-02-18 NOTE — PROGRESS NOTES
CC: \"not bad\"    INTERVAL HISTORY (In Person):  Mr. Martinez is a 74-year-old  male who is following up with me 4 months since his last appointment regarding a longstanding history of severe Generalized Anxiety Disorder as well as a remote history of Depression, for which he has been in remission for some time. He has been maintained on Diazepam for over 50+ years as noted below, with reasonable control of his anxiety noted.  He has historically been very sensitive to other medications including other benzos, and has been unable to tolerate many, many meds, but the Valium is something he has tolerated, and has utilized on a controlled basis.  Over the past several years we've also tried Fluoxetine for the mild dysphoria he often has, and more recently we've used Mirtazapine to also help with sleep (and mood). He was feeling slightly better the last 7 times, mostly due to fewer financial concerns. However, various marital and political/social issues continue to stress him.      He comes in today stating that he's still been feeling \"pretty good\" overall, and that he's not had any real trouble with his mood or anxiety issues. He revealed that he and his wife are now planning on moving to [place], initially living with Erica's family and then finding their own home to move into. The cost of living here is just too high compared to [place], and he seems to be mostly content with the plan. However, they have to sell the home and all the other things that need to be taken care of, and he has some anxiety about that. However, his anxiety level has been better overall, and he continues to remain fairly stable using the Valium prn. He also feels that the Mirtazapine helps his mood and his sleep, and he denies having any SE's with either med. He's still mild to moderately frustrated with a number of political and social issues, but slightly less so no that he has other things to attend to. His situation with his wife

## 2025-02-25 DIAGNOSIS — M15.9 POLYOSTEOARTHRITIS, UNSPECIFIED: ICD-10-CM

## 2025-02-26 RX ORDER — CYCLOBENZAPRINE HCL 10 MG
TABLET ORAL
Qty: 90 TABLET | Refills: 1 | Status: SHIPPED | OUTPATIENT
Start: 2025-02-26

## 2025-04-10 RX ORDER — CLOTRIMAZOLE AND BETAMETHASONE DIPROPIONATE 10; .64 MG/G; MG/G
CREAM TOPICAL
Qty: 15 G | Refills: 0 | Status: SHIPPED | OUTPATIENT
Start: 2025-04-10

## 2025-04-10 RX ORDER — WARFARIN SODIUM 2 MG/1
TABLET ORAL
Qty: 12 TABLET | Refills: 1 | Status: SHIPPED | OUTPATIENT
Start: 2025-04-10

## 2025-05-13 DIAGNOSIS — F41.1 GENERALIZED ANXIETY DISORDER: Primary | ICD-10-CM

## 2025-05-13 RX ORDER — DIAZEPAM 10 MG/1
10 TABLET ORAL 4 TIMES DAILY PRN
Qty: 120 TABLET | Refills: 5 | Status: SHIPPED | OUTPATIENT
Start: 2025-05-13 | End: 2025-11-09

## 2025-05-19 RX ORDER — TERAZOSIN 5 MG/1
5 CAPSULE ORAL NIGHTLY
Qty: 90 CAPSULE | Refills: 1 | Status: SHIPPED | OUTPATIENT
Start: 2025-05-19

## 2025-07-14 DIAGNOSIS — E78.1 HYPERTRIGLYCERIDEMIA: ICD-10-CM

## 2025-07-15 RX ORDER — OMEGA-3/DHA/EPA/FISH OIL 300-1000MG
2 CAPSULE,DELAYED RELEASE (ENTERIC COATED) ORAL 2 TIMES DAILY
Qty: 120 CAPSULE | Refills: 11 | Status: SHIPPED | OUTPATIENT
Start: 2025-07-15

## 2025-07-15 NOTE — TELEPHONE ENCOUNTER
Patient requesting refill on     Requested Prescriptions     Pending Prescriptions Disp Refills    Omega-3 Fatty Acids (ODORLESS COATED FISH OIL) 1000 MG CPDR [Pharmacy Med Name: FISH OIL 1000MG CAPSULE] 120 capsule 11     Sig: TAKE 2 CAPSULES BY MOUTH TWICE A DAY        Last OV 11/15/2024

## 2025-07-20 DIAGNOSIS — M15.9 POLYOSTEOARTHRITIS, UNSPECIFIED: ICD-10-CM

## 2025-07-21 DIAGNOSIS — D68.59 OTHER PRIMARY THROMBOPHILIA: ICD-10-CM

## 2025-07-21 RX ORDER — CYCLOBENZAPRINE HCL 10 MG
10 TABLET ORAL DAILY PRN
Qty: 90 TABLET | Refills: 1 | Status: SHIPPED | OUTPATIENT
Start: 2025-07-21

## 2025-07-21 NOTE — TELEPHONE ENCOUNTER
Patient requesting refill on     Requested Prescriptions     Pending Prescriptions Disp Refills    cyclobenzaprine (FLEXERIL) 10 MG tablet [Pharmacy Med Name: CYCLOBENZAPRINE 10 MG TABLET] 90 tablet 1     Sig: TAKE 1 TABLET BY MOUTH EVERY DAY AS NEEDED FOR PAIN        Last OV 11/15/2024

## 2025-07-21 NOTE — TELEPHONE ENCOUNTER
PT would like a refill on:    warfarin (COUMADIN) 3 MG tablet [[id number]]     NOV 7/24    [id number] - [place], [place] - [facility] - P [phone] - F [phone]

## 2025-07-21 NOTE — TELEPHONE ENCOUNTER
Patient requesting refill on     Requested Prescriptions     Pending Prescriptions Disp Refills    warfarin (COUMADIN) 3 MG tablet 90 tablet 1     Sig: TAKE 1 TAB DAILY EXCEPT TUESDAYS TAKE 2MG        Last OV 11/15/2024

## 2025-07-23 RX ORDER — WARFARIN SODIUM 3 MG/1
TABLET ORAL
Qty: 90 TABLET | Refills: 1 | Status: SHIPPED | OUTPATIENT
Start: 2025-07-23

## 2025-07-24 ENCOUNTER — OFFICE VISIT (OUTPATIENT)
Age: 75
End: 2025-07-24
Payer: MEDICARE

## 2025-07-24 VITALS
WEIGHT: 192.2 LBS | HEIGHT: 68 IN | BODY MASS INDEX: 29.13 KG/M2 | OXYGEN SATURATION: 96 % | RESPIRATION RATE: 16 BRPM | SYSTOLIC BLOOD PRESSURE: 130 MMHG | TEMPERATURE: 98.5 F | DIASTOLIC BLOOD PRESSURE: 76 MMHG | HEART RATE: 74 BPM

## 2025-07-24 DIAGNOSIS — N40.1 BENIGN PROSTATIC HYPERPLASIA WITH LOWER URINARY TRACT SYMPTOMS, SYMPTOM DETAILS UNSPECIFIED: ICD-10-CM

## 2025-07-24 DIAGNOSIS — Z00.00 MEDICARE ANNUAL WELLNESS VISIT, SUBSEQUENT: Primary | ICD-10-CM

## 2025-07-24 DIAGNOSIS — E78.1 HYPERTRIGLYCERIDEMIA: ICD-10-CM

## 2025-07-24 DIAGNOSIS — F41.1 GENERALIZED ANXIETY DISORDER: ICD-10-CM

## 2025-07-24 DIAGNOSIS — Z79.01 LONG TERM (CURRENT) USE OF ANTICOAGULANTS: ICD-10-CM

## 2025-07-24 DIAGNOSIS — Z12.5 SCREENING PSA (PROSTATE SPECIFIC ANTIGEN): ICD-10-CM

## 2025-07-24 LAB
POC INR: 2.4
PROTHROMBIN TIME, POC: 28.5

## 2025-07-24 PROCEDURE — 3017F COLORECTAL CA SCREEN DOC REV: CPT | Performed by: NURSE PRACTITIONER

## 2025-07-24 PROCEDURE — G0439 PPPS, SUBSEQ VISIT: HCPCS | Performed by: NURSE PRACTITIONER

## 2025-07-24 PROCEDURE — 99214 OFFICE O/P EST MOD 30 MIN: CPT | Performed by: NURSE PRACTITIONER

## 2025-07-24 PROCEDURE — 1125F AMNT PAIN NOTED PAIN PRSNT: CPT | Performed by: NURSE PRACTITIONER

## 2025-07-24 PROCEDURE — 36415 COLL VENOUS BLD VENIPUNCTURE: CPT | Performed by: NURSE PRACTITIONER

## 2025-07-24 PROCEDURE — 85610 PROTHROMBIN TIME: CPT | Performed by: NURSE PRACTITIONER

## 2025-07-24 PROCEDURE — 1036F TOBACCO NON-USER: CPT | Performed by: NURSE PRACTITIONER

## 2025-07-24 PROCEDURE — G8417 CALC BMI ABV UP PARAM F/U: HCPCS | Performed by: NURSE PRACTITIONER

## 2025-07-24 PROCEDURE — 1123F ACP DISCUSS/DSCN MKR DOCD: CPT | Performed by: NURSE PRACTITIONER

## 2025-07-24 PROCEDURE — G8427 DOCREV CUR MEDS BY ELIG CLIN: HCPCS | Performed by: NURSE PRACTITIONER

## 2025-07-24 PROCEDURE — 1159F MED LIST DOCD IN RCRD: CPT | Performed by: NURSE PRACTITIONER

## 2025-07-24 PROCEDURE — 1160F RVW MEDS BY RX/DR IN RCRD: CPT | Performed by: NURSE PRACTITIONER

## 2025-07-24 RX ORDER — CLOTRIMAZOLE AND BETAMETHASONE DIPROPIONATE 10; .64 MG/G; MG/G
CREAM TOPICAL
Qty: 15 G | Refills: 0 | Status: SHIPPED | OUTPATIENT
Start: 2025-07-24

## 2025-07-24 SDOH — ECONOMIC STABILITY: FOOD INSECURITY: WITHIN THE PAST 12 MONTHS, YOU WORRIED THAT YOUR FOOD WOULD RUN OUT BEFORE YOU GOT MONEY TO BUY MORE.: NEVER TRUE

## 2025-07-24 SDOH — ECONOMIC STABILITY: FOOD INSECURITY: WITHIN THE PAST 12 MONTHS, THE FOOD YOU BOUGHT JUST DIDN'T LAST AND YOU DIDN'T HAVE MONEY TO GET MORE.: NEVER TRUE

## 2025-07-24 ASSESSMENT — PATIENT HEALTH QUESTIONNAIRE - PHQ9
SUM OF ALL RESPONSES TO PHQ QUESTIONS 1-9: 4
4. FEELING TIRED OR HAVING LITTLE ENERGY: SEVERAL DAYS
9. THOUGHTS THAT YOU WOULD BE BETTER OFF DEAD, OR OF HURTING YOURSELF: NOT AT ALL
7. TROUBLE CONCENTRATING ON THINGS, SUCH AS READING THE NEWSPAPER OR WATCHING TELEVISION: SEVERAL DAYS
1. LITTLE INTEREST OR PLEASURE IN DOING THINGS: NOT AT ALL
3. TROUBLE FALLING OR STAYING ASLEEP: NOT AT ALL
8. MOVING OR SPEAKING SO SLOWLY THAT OTHER PEOPLE COULD HAVE NOTICED. OR THE OPPOSITE, BEING SO FIGETY OR RESTLESS THAT YOU HAVE BEEN MOVING AROUND A LOT MORE THAN USUAL: NOT AT ALL
10. IF YOU CHECKED OFF ANY PROBLEMS, HOW DIFFICULT HAVE THESE PROBLEMS MADE IT FOR YOU TO DO YOUR WORK, TAKE CARE OF THINGS AT HOME, OR GET ALONG WITH OTHER PEOPLE: NOT DIFFICULT AT ALL
5. POOR APPETITE OR OVEREATING: SEVERAL DAYS
SUM OF ALL RESPONSES TO PHQ QUESTIONS 1-9: 4
SUM OF ALL RESPONSES TO PHQ QUESTIONS 1-9: 4
2. FEELING DOWN, DEPRESSED OR HOPELESS: NOT AT ALL
6. FEELING BAD ABOUT YOURSELF - OR THAT YOU ARE A FAILURE OR HAVE LET YOURSELF OR YOUR FAMILY DOWN: SEVERAL DAYS
SUM OF ALL RESPONSES TO PHQ QUESTIONS 1-9: 4

## 2025-07-24 NOTE — PROGRESS NOTES
Subjective:     CC: chronic anticoagulation, HLD    Sarabjit Martinez is a 74 y.o. male who presents today for an overdue follow up for anticoagulation and HLD.     Today he mentions that his house needs a lot of repair work and he is thinking about selling it and moving to [place] with his wife.    He takes Coumadin for hx of DVT in left leg back in 2011.  Has been taking 3mg daily except Tuesdays take 2mg.   He denies any s/s of abnormal bleeding.    Last INR 2.6 in November (he refuses to come every month).   Today it is at goal, 2.4    I previously ordered him the home INR machine through [facility] in the past per his rquest but he never followed through with it.      Hypertriglyceridemia  Fenofibrate and fish oil both interact with his Coumadin but his triglycerides were so high he really wanted to take something. He started the fish oil after  and his coumadin dose was adjusted accordingly.  Triglycerides have been better than they were.     Lab Results   Component Value Date    CHOL 138 12/19/2024    TRIG 284 (H) 12/19/2024    HDL 32 12/19/2024    LDL 49.2 12/19/2024    VLDL 56.8 12/19/2024    CHOLHDLRATIO 4.3 12/19/2024         R sciatica  He has chronic intermittent pain in the right buttocks that radiates down the posterior leg.   Aggravated with increased activity.   He cannot take NSAIDS due to Coumadin use.   Has takes acetaminophen and Flexeril as needed.   He also smokes marijuana.   Could not tolerate Gabapentin in the past.   At a previous OV he was given the information to a medical marijuana specialist per his request. Unfortunately he was unable to complete his application.     GUMARO and Major depression  Followed by psych Dr Escamilla.     Takes Mirtazipine qHS with Valium prn.    IBS  Takes hyoscyamine prn abdominal cramping/ diarrhea.   Takes stool softener and miralax as needed for constipation.     BPH  Symptoms well controlled with Terazosin qHS.     Will check PSA today.    Health

## 2025-07-24 NOTE — PATIENT INSTRUCTIONS
serious illness that gets worse over time or can't be cured?  What are you most afraid of that might happen? (Maybe you're afraid of having pain, losing your independence, or being kept alive by machines.)  Where would you prefer to die? (Your home? A hospital? A nursing home?)  Do you want to donate your organs when you die?  Do you want certain [employer] practices performed before you die?  When should you call for help?  Be sure to contact your doctor if you have any questions.  Where can you learn more?  Go to https://www.[employer].net/patientEd and enter R264 to learn more about \"Advance Directives: Care Instructions.\"  Current as of: April 30, 2024  Content Version: 14.5  © 2024-2025 [employer].   Care instructions adapted under license by [employer]. If you have questions about a medical condition or this instruction, always ask your healthcare professional. [employer]viously [employer], disclaims any warranty or liability for your use of this information.         A Healthy Heart: Care Instructions  Overview     Coronary artery disease, also called heart disease, occurs when a substance called plaque builds up in the vessels that supply oxygen-rich blood to your heart muscle. This can narrow the blood vessels and reduce blood flow. A heart attack happens when blood flow is completely blocked. A high-fat diet, smoking, and other factors increase the risk of heart disease.  Your doctor has found that you have a chance of having heart disease. A heart-healthy lifestyle can help keep your heart healthy and prevent heart disease. This lifestyle includes eating healthy, being active, staying at a weight that's healthy for you, and not smoking or using tobacco. It also includes taking medicines as directed, managing other health conditions, and trying to get a healthy amount of sleep.  Follow-up care is a key part of your treatment and safety. Be sure to make and go to all appointments, and call your doctor

## 2025-07-24 NOTE — PROGRESS NOTES
Chief Complaint   Patient presents with    Medicare AWV       Vitals:    07/24/25 1146   BP: 130/76   Pulse: 74   Resp: 16   Temp: 98.5 °F (36.9 °C)   SpO2: 96%     Have you been to the ER, urgent care clinic since your last visit?  Hospitalized since your last visit?   NO    Have you seen or consulted any other health care providers outside our system since your last visit?   NO

## 2025-07-25 LAB
ALBUMIN SERPL-MCNC: 4 G/DL (ref 3.5–5)
ALBUMIN/GLOB SERPL: 1.3 (ref 1.1–2.2)
ALP SERPL-CCNC: 49 U/L (ref 45–117)
ALT SERPL-CCNC: 30 U/L (ref 12–78)
ANION GAP SERPL CALC-SCNC: 4 MMOL/L (ref 2–12)
AST SERPL-CCNC: 18 U/L (ref 15–37)
BASOPHILS # BLD: 0.08 K/UL (ref 0–0.1)
BASOPHILS NFR BLD: 0.9 % (ref 0–1)
BILIRUB SERPL-MCNC: 0.9 MG/DL (ref 0.2–1)
BUN SERPL-MCNC: 12 MG/DL (ref 6–20)
BUN/CREAT SERPL: 10 (ref 12–20)
CALCIUM SERPL-MCNC: 9.1 MG/DL (ref 8.5–10.1)
CHLORIDE SERPL-SCNC: 105 MMOL/L (ref 97–108)
CHOLEST SERPL-MCNC: 146 MG/DL
CO2 SERPL-SCNC: 30 MMOL/L (ref 21–32)
CREAT SERPL-MCNC: 1.21 MG/DL (ref 0.7–1.3)
DIFFERENTIAL METHOD BLD: NORMAL
EOSINOPHIL # BLD: 0.34 K/UL (ref 0–0.4)
EOSINOPHIL NFR BLD: 4 % (ref 0–7)
ERYTHROCYTE [DISTWIDTH] IN BLOOD BY AUTOMATED COUNT: 12.3 % (ref 11.5–14.5)
GLOBULIN SER CALC-MCNC: 3 G/DL (ref 2–4)
GLUCOSE SERPL-MCNC: 120 MG/DL (ref 65–100)
HCT VFR BLD AUTO: 46.9 % (ref 36.6–50.3)
HDLC SERPL-MCNC: 35 MG/DL
HDLC SERPL: 4.2 (ref 0–5)
HGB BLD-MCNC: 15.6 G/DL (ref 12.1–17)
IMM GRANULOCYTES # BLD AUTO: 0.04 K/UL (ref 0–0.04)
IMM GRANULOCYTES NFR BLD AUTO: 0.5 % (ref 0–0.5)
LDLC SERPL CALC-MCNC: 61 MG/DL (ref 0–100)
LYMPHOCYTES # BLD: 2.25 K/UL (ref 0.8–3.5)
LYMPHOCYTES NFR BLD: 26.4 % (ref 12–49)
MCH RBC QN AUTO: 30.4 PG (ref 26–34)
MCHC RBC AUTO-ENTMCNC: 33.3 G/DL (ref 30–36.5)
MCV RBC AUTO: 91.4 FL (ref 80–99)
MONOCYTES # BLD: 0.77 K/UL (ref 0–1)
MONOCYTES NFR BLD: 9 % (ref 5–13)
NEUTS SEG # BLD: 5.04 K/UL (ref 1.8–8)
NEUTS SEG NFR BLD: 59.2 % (ref 32–75)
NRBC # BLD: 0 K/UL (ref 0–0.01)
NRBC BLD-RTO: 0 PER 100 WBC
PLATELET # BLD AUTO: 213 K/UL (ref 150–400)
PMV BLD AUTO: 10 FL (ref 8.9–12.9)
POTASSIUM SERPL-SCNC: 4.3 MMOL/L (ref 3.5–5.1)
PROT SERPL-MCNC: 7 G/DL (ref 6.4–8.2)
PSA SERPL-MCNC: 2.6 NG/ML (ref 0.01–4)
RBC # BLD AUTO: 5.13 M/UL (ref 4.1–5.7)
SODIUM SERPL-SCNC: 139 MMOL/L (ref 136–145)
TRIGL SERPL-MCNC: 250 MG/DL
VLDLC SERPL CALC-MCNC: 50 MG/DL
WBC # BLD AUTO: 8.5 K/UL (ref 4.1–11.1)

## 2025-07-26 ENCOUNTER — RESULTS FOLLOW-UP (OUTPATIENT)
Age: 75
End: 2025-07-26

## (undated) DEVICE — SOLUTION IRRIG 1000ML STRL H2O USP PLAS POUR BTL

## (undated) DEVICE — BLUNT CANNULA: Brand: MONOJECT

## (undated) DEVICE — LINER,SEMI-RIGID,3000CC,50EA/CS: Brand: MEDLINE

## (undated) DEVICE — TRAP POLYP 1 CHMBR WIDE EYE

## (undated) DEVICE — SYRINGE 20ML LL S/C 50

## (undated) DEVICE — GOWN,ISO,KNITCUFF, PREMIUM BLUE, LV3,XL: Brand: MEDLINE INDUSTRIES, INC.

## (undated) DEVICE — KIT ENDO OP4 CA 1.1+ BX20

## (undated) DEVICE — SNARE ENDOSCP L240CM SHTH DIA2.4MM LOOP W20MM MIN WRK CHN

## (undated) DEVICE — ELECTRODE PT RET AD L9FT HI MOIST COND ADH HYDRGEL CORDED

## (undated) DEVICE — FORCEPS BX L240CM JAW DIA2.2MM RAD JAW 4 HOT DISP